# Patient Record
Sex: FEMALE | Race: WHITE
[De-identification: names, ages, dates, MRNs, and addresses within clinical notes are randomized per-mention and may not be internally consistent; named-entity substitution may affect disease eponyms.]

---

## 2021-06-22 ENCOUNTER — APPOINTMENT (OUTPATIENT)
Dept: DISASTER EMERGENCY | Facility: OTHER | Age: 86
End: 2021-06-22
Payer: COMMERCIAL

## 2021-06-22 PROCEDURE — 0001A: CPT

## 2021-07-13 ENCOUNTER — APPOINTMENT (OUTPATIENT)
Dept: DISASTER EMERGENCY | Facility: OTHER | Age: 86
End: 2021-07-13
Payer: COMMERCIAL

## 2021-07-13 PROCEDURE — 0002A: CPT

## 2022-12-03 ENCOUNTER — TRANSCRIPTION ENCOUNTER (OUTPATIENT)
Age: 87
End: 2022-12-03

## 2022-12-04 ENCOUNTER — INPATIENT (INPATIENT)
Facility: HOSPITAL | Age: 87
LOS: 11 days | Discharge: NURSING, REHAB OR LONG-TERM CARE | DRG: 480 | End: 2022-12-16
Attending: SPECIALIST | Admitting: SPECIALIST
Payer: MEDICARE

## 2022-12-04 VITALS
HEART RATE: 84 BPM | TEMPERATURE: 98 F | WEIGHT: 89.95 LBS | DIASTOLIC BLOOD PRESSURE: 88 MMHG | SYSTOLIC BLOOD PRESSURE: 173 MMHG | RESPIRATION RATE: 18 BRPM | HEIGHT: 60 IN | OXYGEN SATURATION: 96 %

## 2022-12-04 DIAGNOSIS — S72.142A DISPLACED INTERTROCHANTERIC FRACTURE OF LEFT FEMUR, INITIAL ENCOUNTER FOR CLOSED FRACTURE: ICD-10-CM

## 2022-12-04 DIAGNOSIS — S72.002A FRACTURE OF UNSPECIFIED PART OF NECK OF LEFT FEMUR, INITIAL ENCOUNTER FOR CLOSED FRACTURE: ICD-10-CM

## 2022-12-04 DIAGNOSIS — R52 PAIN, UNSPECIFIED: ICD-10-CM

## 2022-12-04 DIAGNOSIS — F03.90 UNSPECIFIED DEMENTIA, UNSPECIFIED SEVERITY, WITHOUT BEHAVIORAL DISTURBANCE, PSYCHOTIC DISTURBANCE, MOOD DISTURBANCE, AND ANXIETY: ICD-10-CM

## 2022-12-04 DIAGNOSIS — I10 ESSENTIAL (PRIMARY) HYPERTENSION: ICD-10-CM

## 2022-12-04 LAB
ALBUMIN SERPL ELPH-MCNC: 4.2 G/DL — SIGNIFICANT CHANGE UP (ref 3.3–5)
ALP SERPL-CCNC: 84 U/L — SIGNIFICANT CHANGE UP (ref 40–120)
ALT FLD-CCNC: 24 U/L — SIGNIFICANT CHANGE UP (ref 10–45)
ANION GAP SERPL CALC-SCNC: 12 MMOL/L — SIGNIFICANT CHANGE UP (ref 5–17)
ANION GAP SERPL CALC-SCNC: 13 MMOL/L — SIGNIFICANT CHANGE UP (ref 5–17)
APTT BLD: 27.6 SEC — SIGNIFICANT CHANGE UP (ref 27.5–35.5)
AST SERPL-CCNC: 22 U/L — SIGNIFICANT CHANGE UP (ref 10–40)
BASOPHILS # BLD AUTO: 0.07 K/UL — SIGNIFICANT CHANGE UP (ref 0–0.2)
BASOPHILS NFR BLD AUTO: 0.5 % — SIGNIFICANT CHANGE UP (ref 0–2)
BILIRUB SERPL-MCNC: 0.6 MG/DL — SIGNIFICANT CHANGE UP (ref 0.2–1.2)
BLD GP AB SCN SERPL QL: NEGATIVE — SIGNIFICANT CHANGE UP
BUN SERPL-MCNC: 10 MG/DL — SIGNIFICANT CHANGE UP (ref 7–23)
BUN SERPL-MCNC: 12 MG/DL — SIGNIFICANT CHANGE UP (ref 7–23)
CALCIUM SERPL-MCNC: 8.9 MG/DL — SIGNIFICANT CHANGE UP (ref 8.4–10.5)
CALCIUM SERPL-MCNC: 9.7 MG/DL — SIGNIFICANT CHANGE UP (ref 8.4–10.5)
CHLORIDE SERPL-SCNC: 96 MMOL/L — SIGNIFICANT CHANGE UP (ref 96–108)
CHLORIDE SERPL-SCNC: 96 MMOL/L — SIGNIFICANT CHANGE UP (ref 96–108)
CO2 SERPL-SCNC: 22 MMOL/L — SIGNIFICANT CHANGE UP (ref 22–31)
CO2 SERPL-SCNC: 25 MMOL/L — SIGNIFICANT CHANGE UP (ref 22–31)
CREAT SERPL-MCNC: 0.53 MG/DL — SIGNIFICANT CHANGE UP (ref 0.5–1.3)
CREAT SERPL-MCNC: 0.53 MG/DL — SIGNIFICANT CHANGE UP (ref 0.5–1.3)
EGFR: 86 ML/MIN/1.73M2 — SIGNIFICANT CHANGE UP
EGFR: 86 ML/MIN/1.73M2 — SIGNIFICANT CHANGE UP
EOSINOPHIL # BLD AUTO: 0.05 K/UL — SIGNIFICANT CHANGE UP (ref 0–0.5)
EOSINOPHIL NFR BLD AUTO: 0.4 % — SIGNIFICANT CHANGE UP (ref 0–6)
GLUCOSE SERPL-MCNC: 122 MG/DL — HIGH (ref 70–99)
GLUCOSE SERPL-MCNC: 125 MG/DL — HIGH (ref 70–99)
HCT VFR BLD CALC: 30.9 % — LOW (ref 34.5–45)
HCT VFR BLD CALC: 33.9 % — LOW (ref 34.5–45)
HGB BLD-MCNC: 10.4 G/DL — LOW (ref 11.5–15.5)
HGB BLD-MCNC: 11.4 G/DL — LOW (ref 11.5–15.5)
IMM GRANULOCYTES NFR BLD AUTO: 0.7 % — SIGNIFICANT CHANGE UP (ref 0–0.9)
INR BLD: 1.09 RATIO — SIGNIFICANT CHANGE UP (ref 0.88–1.16)
LYMPHOCYTES # BLD AUTO: 1.09 K/UL — SIGNIFICANT CHANGE UP (ref 1–3.3)
LYMPHOCYTES # BLD AUTO: 8.1 % — LOW (ref 13–44)
MCHC RBC-ENTMCNC: 30.6 PG — SIGNIFICANT CHANGE UP (ref 27–34)
MCHC RBC-ENTMCNC: 30.6 PG — SIGNIFICANT CHANGE UP (ref 27–34)
MCHC RBC-ENTMCNC: 33.6 GM/DL — SIGNIFICANT CHANGE UP (ref 32–36)
MCHC RBC-ENTMCNC: 33.7 GM/DL — SIGNIFICANT CHANGE UP (ref 32–36)
MCV RBC AUTO: 90.9 FL — SIGNIFICANT CHANGE UP (ref 80–100)
MCV RBC AUTO: 91.1 FL — SIGNIFICANT CHANGE UP (ref 80–100)
MONOCYTES # BLD AUTO: 0.65 K/UL — SIGNIFICANT CHANGE UP (ref 0–0.9)
MONOCYTES NFR BLD AUTO: 4.9 % — SIGNIFICANT CHANGE UP (ref 2–14)
NEUTROPHILS # BLD AUTO: 11.44 K/UL — HIGH (ref 1.8–7.4)
NEUTROPHILS NFR BLD AUTO: 85.4 % — HIGH (ref 43–77)
NRBC # BLD: 0 /100 WBCS — SIGNIFICANT CHANGE UP (ref 0–0)
NRBC # BLD: 0 /100 WBCS — SIGNIFICANT CHANGE UP (ref 0–0)
PLATELET # BLD AUTO: 254 K/UL — SIGNIFICANT CHANGE UP (ref 150–400)
PLATELET # BLD AUTO: 287 K/UL — SIGNIFICANT CHANGE UP (ref 150–400)
POTASSIUM SERPL-MCNC: 4 MMOL/L — SIGNIFICANT CHANGE UP (ref 3.5–5.3)
POTASSIUM SERPL-MCNC: 4.1 MMOL/L — SIGNIFICANT CHANGE UP (ref 3.5–5.3)
POTASSIUM SERPL-SCNC: 4 MMOL/L — SIGNIFICANT CHANGE UP (ref 3.5–5.3)
POTASSIUM SERPL-SCNC: 4.1 MMOL/L — SIGNIFICANT CHANGE UP (ref 3.5–5.3)
PROT SERPL-MCNC: 6.3 G/DL — SIGNIFICANT CHANGE UP (ref 6–8.3)
PROTHROM AB SERPL-ACNC: 12.6 SEC — SIGNIFICANT CHANGE UP (ref 10.5–13.4)
RBC # BLD: 3.4 M/UL — LOW (ref 3.8–5.2)
RBC # BLD: 3.72 M/UL — LOW (ref 3.8–5.2)
RBC # FLD: 12.9 % — SIGNIFICANT CHANGE UP (ref 10.3–14.5)
RBC # FLD: 12.9 % — SIGNIFICANT CHANGE UP (ref 10.3–14.5)
RH IG SCN BLD-IMP: POSITIVE — SIGNIFICANT CHANGE UP
SARS-COV-2 RNA SPEC QL NAA+PROBE: SIGNIFICANT CHANGE UP
SODIUM SERPL-SCNC: 131 MMOL/L — LOW (ref 135–145)
SODIUM SERPL-SCNC: 133 MMOL/L — LOW (ref 135–145)
WBC # BLD: 12.55 K/UL — HIGH (ref 3.8–10.5)
WBC # BLD: 13.4 K/UL — HIGH (ref 3.8–10.5)
WBC # FLD AUTO: 12.55 K/UL — HIGH (ref 3.8–10.5)
WBC # FLD AUTO: 13.4 K/UL — HIGH (ref 3.8–10.5)

## 2022-12-04 PROCEDURE — 99285 EMERGENCY DEPT VISIT HI MDM: CPT

## 2022-12-04 PROCEDURE — 71045 X-RAY EXAM CHEST 1 VIEW: CPT | Mod: 26

## 2022-12-04 PROCEDURE — 27245 TREAT THIGH FRACTURE: CPT | Mod: LT

## 2022-12-04 PROCEDURE — 93010 ELECTROCARDIOGRAM REPORT: CPT

## 2022-12-04 PROCEDURE — 73120 X-RAY EXAM OF HAND: CPT | Mod: 26,LT

## 2022-12-04 PROCEDURE — 73551 X-RAY EXAM OF FEMUR 1: CPT | Mod: 26,LT

## 2022-12-04 PROCEDURE — 73502 X-RAY EXAM HIP UNI 2-3 VIEWS: CPT | Mod: 26,LT

## 2022-12-04 DEVICE — BLADE HEL 11X85MM TI STRL: Type: IMPLANTABLE DEVICE | Site: LEFT | Status: FUNCTIONAL

## 2022-12-04 DEVICE — SCREW LOCKING 5.0MM: Type: IMPLANTABLE DEVICE | Site: LEFT | Status: FUNCTIONAL

## 2022-12-04 DEVICE — IMPLANTABLE DEVICE: Type: IMPLANTABLE DEVICE | Site: LEFT | Status: FUNCTIONAL

## 2022-12-04 RX ORDER — LISINOPRIL 2.5 MG/1
20 TABLET ORAL DAILY
Refills: 0 | Status: DISCONTINUED | OUTPATIENT
Start: 2022-12-04 | End: 2022-12-16

## 2022-12-04 RX ORDER — ACETAMINOPHEN 500 MG
600 TABLET ORAL ONCE
Refills: 0 | Status: COMPLETED | OUTPATIENT
Start: 2022-12-04 | End: 2022-12-04

## 2022-12-04 RX ORDER — ATORVASTATIN CALCIUM 80 MG/1
10 TABLET, FILM COATED ORAL AT BEDTIME
Refills: 0 | Status: DISCONTINUED | OUTPATIENT
Start: 2022-12-04 | End: 2022-12-16

## 2022-12-04 RX ORDER — OXYCODONE HYDROCHLORIDE 5 MG/1
5 TABLET ORAL EVERY 4 HOURS
Refills: 0 | Status: DISCONTINUED | OUTPATIENT
Start: 2022-12-04 | End: 2022-12-07

## 2022-12-04 RX ORDER — FENTANYL CITRATE 50 UG/ML
25 INJECTION INTRAVENOUS
Refills: 0 | Status: DISCONTINUED | OUTPATIENT
Start: 2022-12-04 | End: 2022-12-04

## 2022-12-04 RX ORDER — SODIUM CHLORIDE 9 MG/ML
1000 INJECTION, SOLUTION INTRAVENOUS
Refills: 0 | Status: DISCONTINUED | OUTPATIENT
Start: 2022-12-04 | End: 2022-12-04

## 2022-12-04 RX ORDER — OXYCODONE HYDROCHLORIDE 5 MG/1
5 TABLET ORAL EVERY 4 HOURS
Refills: 0 | Status: DISCONTINUED | OUTPATIENT
Start: 2022-12-04 | End: 2022-12-04

## 2022-12-04 RX ORDER — CEFAZOLIN SODIUM 1 G
2000 VIAL (EA) INJECTION EVERY 8 HOURS
Refills: 0 | Status: COMPLETED | OUTPATIENT
Start: 2022-12-04 | End: 2022-12-04

## 2022-12-04 RX ORDER — OXYCODONE HYDROCHLORIDE 5 MG/1
2.5 TABLET ORAL EVERY 4 HOURS
Refills: 0 | Status: DISCONTINUED | OUTPATIENT
Start: 2022-12-04 | End: 2022-12-07

## 2022-12-04 RX ORDER — LISINOPRIL 2.5 MG/1
20 TABLET ORAL EVERY 12 HOURS
Refills: 0 | Status: DISCONTINUED | OUTPATIENT
Start: 2022-12-04 | End: 2022-12-04

## 2022-12-04 RX ORDER — LANOLIN ALCOHOL/MO/W.PET/CERES
3 CREAM (GRAM) TOPICAL AT BEDTIME
Refills: 0 | Status: DISCONTINUED | OUTPATIENT
Start: 2022-12-04 | End: 2022-12-04

## 2022-12-04 RX ORDER — METOPROLOL TARTRATE 50 MG
50 TABLET ORAL DAILY
Refills: 0 | Status: DISCONTINUED | OUTPATIENT
Start: 2022-12-04 | End: 2022-12-08

## 2022-12-04 RX ORDER — OXYCODONE HYDROCHLORIDE 5 MG/1
2.5 TABLET ORAL EVERY 4 HOURS
Refills: 0 | Status: DISCONTINUED | OUTPATIENT
Start: 2022-12-04 | End: 2022-12-04

## 2022-12-04 RX ORDER — MAGNESIUM HYDROXIDE 400 MG/1
30 TABLET, CHEWABLE ORAL DAILY
Refills: 0 | Status: DISCONTINUED | OUTPATIENT
Start: 2022-12-04 | End: 2022-12-16

## 2022-12-04 RX ORDER — SENNA PLUS 8.6 MG/1
2 TABLET ORAL AT BEDTIME
Refills: 0 | Status: DISCONTINUED | OUTPATIENT
Start: 2022-12-04 | End: 2022-12-04

## 2022-12-04 RX ORDER — LANOLIN ALCOHOL/MO/W.PET/CERES
3 CREAM (GRAM) TOPICAL AT BEDTIME
Refills: 0 | Status: DISCONTINUED | OUTPATIENT
Start: 2022-12-04 | End: 2022-12-16

## 2022-12-04 RX ORDER — ATORVASTATIN CALCIUM 80 MG/1
10 TABLET, FILM COATED ORAL AT BEDTIME
Refills: 0 | Status: DISCONTINUED | OUTPATIENT
Start: 2022-12-04 | End: 2022-12-04

## 2022-12-04 RX ORDER — METOPROLOL TARTRATE 50 MG
50 TABLET ORAL DAILY
Refills: 0 | Status: DISCONTINUED | OUTPATIENT
Start: 2022-12-04 | End: 2022-12-04

## 2022-12-04 RX ORDER — ENOXAPARIN SODIUM 100 MG/ML
40 INJECTION SUBCUTANEOUS EVERY 24 HOURS
Refills: 0 | Status: DISCONTINUED | OUTPATIENT
Start: 2022-12-04 | End: 2022-12-08

## 2022-12-04 RX ORDER — ACETAMINOPHEN 500 MG
600 TABLET ORAL ONCE
Refills: 0 | Status: DISCONTINUED | OUTPATIENT
Start: 2022-12-04 | End: 2022-12-05

## 2022-12-04 RX ORDER — POLYETHYLENE GLYCOL 3350 17 G/17G
17 POWDER, FOR SOLUTION ORAL DAILY
Refills: 0 | Status: DISCONTINUED | OUTPATIENT
Start: 2022-12-04 | End: 2022-12-16

## 2022-12-04 RX ORDER — SODIUM CHLORIDE 9 MG/ML
1000 INJECTION INTRAMUSCULAR; INTRAVENOUS; SUBCUTANEOUS
Refills: 0 | Status: DISCONTINUED | OUTPATIENT
Start: 2022-12-04 | End: 2022-12-16

## 2022-12-04 RX ORDER — SENNA PLUS 8.6 MG/1
2 TABLET ORAL AT BEDTIME
Refills: 0 | Status: DISCONTINUED | OUTPATIENT
Start: 2022-12-04 | End: 2022-12-16

## 2022-12-04 RX ORDER — ACETAMINOPHEN 500 MG
975 TABLET ORAL EVERY 8 HOURS
Refills: 0 | Status: DISCONTINUED | OUTPATIENT
Start: 2022-12-04 | End: 2022-12-04

## 2022-12-04 RX ORDER — ONDANSETRON 8 MG/1
4 TABLET, FILM COATED ORAL ONCE
Refills: 0 | Status: DISCONTINUED | OUTPATIENT
Start: 2022-12-04 | End: 2022-12-04

## 2022-12-04 RX ORDER — FENTANYL CITRATE 50 UG/ML
50 INJECTION INTRAVENOUS
Refills: 0 | Status: DISCONTINUED | OUTPATIENT
Start: 2022-12-04 | End: 2022-12-04

## 2022-12-04 RX ADMIN — Medication 100 MILLIGRAM(S): at 22:05

## 2022-12-04 RX ADMIN — Medication 100 MILLIGRAM(S): at 14:38

## 2022-12-04 RX ADMIN — Medication 240 MILLIGRAM(S): at 02:20

## 2022-12-04 RX ADMIN — LISINOPRIL 20 MILLIGRAM(S): 2.5 TABLET ORAL at 06:17

## 2022-12-04 RX ADMIN — Medication 600 MILLIGRAM(S): at 03:33

## 2022-12-04 RX ADMIN — Medication 600 MILLIGRAM(S): at 01:36

## 2022-12-04 RX ADMIN — Medication 975 MILLIGRAM(S): at 06:16

## 2022-12-04 RX ADMIN — POLYETHYLENE GLYCOL 3350 17 GRAM(S): 17 POWDER, FOR SOLUTION ORAL at 22:05

## 2022-12-04 RX ADMIN — Medication 50 MILLIGRAM(S): at 06:17

## 2022-12-04 NOTE — CHART NOTE - NSCHARTNOTEFT_GEN_A_CORE
Resting without complaints.  No Chest Pain, SOB, N/V.    T(C): 36 (12-04-22 @ 12:03), Max: 37.2 (12-04-22 @ 06:15)  HR: 67 (12-04-22 @ 12:45) (61 - 102)  BP: 143/67 (12-04-22 @ 12:45) (113/69 - 173/88)  RR: 17 (12-04-22 @ 12:03) (17 - 18)  SpO2: 97% (12-04-22 @ 12:45) (95% - 100%)      Exam:  Alert and Orientx1 to name Baseline with Hx of dementia, No Acute Distress  Card: +S1/S2, RRR  Pulm: CTAB  Laterality: L Hip  Aquacel dressing x 2 c//d/i (Proximal dressing replaced 2/2 patient self removed dressing during agitated state post operatively)  Calves soft, non-tender bilaterally  +PF/DF/EHL/FHL  SILT  + DP pulse    Xray: Intra-Op Fluoroscopy: S/P L Intertrochanteric Femur Fx IM Nail ORIF                          10.4   12.55 )-----------( 254      ( 04 Dec 2022 12:24 )             30.9    12-04    131<L>  |  96  |  10  ----------------------------<  122<H>  4.1   |  22  |  0.53    Ca    8.9      04 Dec 2022 12:24    TPro  6.3  /  Alb  4.2  /  TBili  0.6  /  DBili  x   /  AST  22  /  ALT  24  /  AlkPhos  84  12-04      A/P: 89yFemale S/p L Intertrochanteric Femur Fx IM Nail ORIF. VSS. NAD  -PT/OT-WBAT LLE  -F/U AM Labs tomorow  -Ween off level 1 wrist restraints when patient more oriented and less agitated to prevent self harm.  -IS  -DVT PPx: Lovenox 40mg SQ Daily, SCDs, Early OOB and Ambulation  -Pain Control  -Continue Current Tx  -Dispo planning PACU to Floor    Tarik Falk PA-C  Orthopedic Surgery Team  Team Pager #[phone]

## 2022-12-04 NOTE — H&P ADULT - HISTORY OF PRESENT ILLNESS
Orthopedics Consult Note:    This is a 89y Female who presents to the ED today with c/o left hip pain and inability to bear weight s/p mechanical trip and fall today. Pt denies any other injuries. Pt denies head trauma or LOC. Pt denies any numbness, tingling or paresthesias. Pt is a home ambulator without use of any assistive devices at baseline.    Past Medical & Surgical History:  Displaced intertrochanteric fracture of left femur, initial encounter for closed fracture    Handoff    MEWS Score    Hypertension    Dementia    Intertrochanteric fracture of left hip    HIP INJURY    [email or web address]        Allergies:  No Known Allergies      Vital Signs:  T(C): 36.7 (12-04-22 @ 03:40), Max: 36.7 (12-04-22 @ 00:43)  HR: 61 (12-04-22 @ 03:40) (61 - 84)  BP: 137/73 (12-04-22 @ 03:40) (137/73 - 173/88)  RR: 17 (12-04-22 @ 03:40) (17 - 18)  SpO2: 95% (12-04-22 @ 03:40) (95% - 96%)    Labs:  CBC ( 12-04-22 @ 02:11 )                   11.4  13.40 )-----------( 287                33.9      Chem ( 12-04-22 @ 02:11 )  133  |  96  |  12  ----------------------------<  125  4.0   |  25  |  0.53      PT/INR ( 12-04-22 @ 02:11 )   PT: 12.6;   INR: 1.09      PTT ( 12-04-22 @ 02:11 )  PTT: 27.6      Imaging:  X-rays of the pelvis, left hip and femur demonstrate an intertrochanteric hip fracture.    PE left hip:  LLE is short and ER. +mild swelling, no ecchymosis, no erythema, skin intact, normothermic. +TTP over groin and greater troch. LROM 2' pain. Moving ankle and all toes, +EHL/FHL/TA/GS. SILT throughout. DP and PT pulses 2+.    Secondary Survey:  Left knee, left ankle, RLE, and B/L UEs with no swelling, no ecchymoses, no abrasions, no lacerations or any other signs of injury with full painless baseline ROM and no bony TTP. Able to SLR RLE. No pain with axial loading or log roll RLE. Sensation intact distally, moving all digits. Distal pulses intact.     Spine and ribs with no bony TTP.     Assessment:  89y Female with a left intertrochanteric hip fracture s/p mechanical trip and fall today.    Plan:  Pt and family advised that surgical fixation of right hip fracture with Intramedullary Nail is necessary.  Surgical procedure was discussed in detail with patient and family including all R/B/As; Pt/Pts family expressed understanding and consent for surgery was obtained.  consult Medical service for optimization and medical clearance.  f/u medical clearance.  f/u labs/imaging.  Complete bedrest.  Pain control.  Ice application.  DVT prophylaxis with SCDs for now.  NPO and hold chemical anticoagulation now with plan for possible OR today pending medical optimization and clearance.   IVF while NPO.  Case discussed with Dr. Blair who agrees with plan.

## 2022-12-04 NOTE — CONSULT NOTE ADULT - PROBLEM SELECTOR RECOMMENDATION 3
continue metoprolol and lisinopril  will continue to monitor BP  Will adjust meds as needed  Low sodium diet

## 2022-12-04 NOTE — ED ADULT NURSE NOTE - OBJECTIVE STATEMENT
Pt received awake, confused, A+O x2, disoriented to time and situation. Pt's daughter at bedside states pt's PMHx of Dementia, she is at her baseline. Pt had unwitnessed mechanical fall at home onto carpeted floor. Pt denies pain at rest, she sustained left hip Fx.

## 2022-12-04 NOTE — CHART NOTE - NSCHARTNOTEFT_GEN_A_CORE
RN called provider bedside stating patient agitated and trying to get OOB.  Patient seen bedside who is confused at baseline A&Ox1 (name), with Hx of dementia.  Patient reports wants to get OOB and use bathroom because she needs to urinate.  Patient refusing to use pre-vena and attempting to pull IV and pre-vena off.  Patient plan for OR today after medical clearance.  Patient ordered for wrist restraint to prevent self hard and ordered a Leyva catheter for intra-operative procedure.      Tarik Falk PA-C  Orthopedic Surgery Team  Team Pager #[phone]

## 2022-12-04 NOTE — BRIEF OPERATIVE NOTE - NSICDXBRIEFPROCEDURE_GEN_ALL_CORE_FT
PROCEDURES:  Insertion of locking intramedullary star into left hip 04-Dec-2022 12:22:16  Aguilar Cordon

## 2022-12-04 NOTE — ED PROVIDER NOTE - PROGRESS NOTE DETAILS
Paolo, PGY3: patient updated on hip fracture. pain well controlled. spoke with ortho, will admit for surgical planning.

## 2022-12-04 NOTE — CONSULT NOTE ADULT - ASSESSMENT
89 YO woman with a hx of HTN, HLD, Dementia, macular degeneration, Presents after a fall at home with a left hip fracture. Patient is scheduled fvor an Open reduction and internal fixation of the left hip

## 2022-12-04 NOTE — PATIENT PROFILE ADULT - FALL HARM RISK - HARM RISK INTERVENTIONS

## 2022-12-04 NOTE — CONSULT NOTE ADULT - SUBJECTIVE AND OBJECTIVE BOX
89 yo female presenting after fall. pt states door was partially opened and tripped and fell onto her left side. afterward complained of pain to left leg and hip. Unable to ambulate since. No h/o prior injuries to leg. Denies any head trauma. no n/v. no chest pain or sob. is not on any blood thinners. Did not take anything for pain prior to arrival. Patient was brought to [facility] for further evaluation and treatment. In the ED she was found to have a left hip fracture and is scheduled for an Open reduction and internal fixation of the left hip. Patient seen now resting, confused        PAST MEDICAL & SURGICAL HISTORY:  Hypertension    HLD    Macular degeneration    Dementia            MEDICATIONS  (STANDING):  acetaminophen     Tablet .. 975 milliGRAM(s) Oral every 8 hours  atorvastatin 10 milliGRAM(s) Oral at bedtime  lactated ringers. 1000 milliLiter(s) (75 mL/Hr) IV Continuous <Continuous>  lisinopril 20 milliGRAM(s) Oral every 12 hours  metoprolol succinate ER 50 milliGRAM(s) Oral daily  senna 2 Tablet(s) Oral at bedtime    MEDICATIONS  (PRN):  melatonin 3 milliGRAM(s) Oral at bedtime PRN Insomnia  oxyCODONE    IR 2.5 milliGRAM(s) Oral every 4 hours PRN Moderate Pain (4 - 6)  oxyCODONE    IR 5 milliGRAM(s) Oral every 4 hours PRN Severe Pain (7 - 10)    Social Hx:  Tobacco: Neg  ETOH: neg  Drugs:  Neg    Family Hx:  As per my conversation with the patient, non contributory       ROS  CONSTITUTIONAL: No weakness, fevers or chills  EYES/ENT: Loss of vision  NECK: No pain or stiffness  RESPIRATORY: No cough, wheezing, hemoptysis; No shortness of breath  CARDIOVASCULAR: No chest pain or palpitations  GASTROINTESTINAL: No abdominal or epigastric pain. No nausea, vomiting, or hematemesis; No diarrhea or constipation. No melena or hematochezia.  GENITOURINARY: No dysuria, frequency or hematuria  NEUROLOGICAL: No numbness or weakness  SKIN: No itching, burning, rashes, or lesions   MUSCULOSKELETAL: Left leg pain        INTERVAL HPI/OVERNIGHT EVENTS:  T(C): 37.2 (12-04-22 @ 06:15), Max: 37.2 (12-04-22 @ 06:15)  HR: 75 (12-04-22 @ 06:15) (61 - 84)  BP: 172/69 (12-04-22 @ 06:15) (137/73 - 173/88)  RR: 18 (12-04-22 @ 06:15) (17 - 18)  SpO2: 99% (12-04-22 @ 06:15) (95% - 99%)  Wt(kg): --  I&O's Summary      PHYSICAL EXAM:  GENERAL: NAD, well-groomed, well-developed  HEAD:  Atraumatic, Normocephalic  EYES: EOMI, PERRLA, conjunctiva and sclera clear  ENMT: No tonsillar erythema, exudates, or enlargement; Moist mucous membranes, Good dentition, No lesions  NECK: Supple, No JVD, Normal thyroid  NERVOUS SYSTEM:  Alert & Oriented X1,  Motor Strength 5/5 B/L upper and lower extremities; DTRs 2+ intact and symmetric  CHEST/LUNG: Clear to percussion bilaterally; No rales, rhonchi, wheezing, or rubs  HEART: Regular rate and rhythm; No murmurs, rubs, or gallops  ABDOMEN: Soft, Nontender, Nondistended; Bowel sounds present  EXTREMITIES:  2+ Peripheral Pulses, No clubbing, cyanosis, or edema  LYMPH: No lymphadenopathy noted  SKIN: No rashes or lesions        LABS:                        11.4   13.40 )-----------( 287      ( 04 Dec 2022 02:11 )             33.9     12-04    133<L>  |  96  |  12  ----------------------------<  125<H>  4.0   |  25  |  0.53    Ca    9.7      04 Dec 2022 02:11    TPro  6.3  /  Alb  4.2  /  TBili  0.6  /  DBili  x   /  AST  22  /  ALT  24  /  AlkPhos  84  12-04    PT/INR - ( 04 Dec 2022 02:11 )   PT: 12.6 sec;   INR: 1.09 ratio         PTT - ( 04 Dec 2022 02:11 )  PTT:27.6 sec    EKG:     89 yo female presenting after fall. pt states door was partially opened and tripped and fell onto her left side. afterward complained of pain to left leg and hip. Unable to ambulate since. No h/o prior injuries to leg. Denies any head trauma. no n/v. no chest pain or sob. is not on any blood thinners. Did not take anything for pain prior to arrival. Patient was brought to [facility] for further evaluation and treatment. In the ED she was found to have a left hip fracture and is scheduled for an Open reduction and internal fixation of the left hip. Patient seen now resting, confused        PAST MEDICAL & SURGICAL HISTORY:  Hypertension    HLD    Macular degeneration    Dementia            MEDICATIONS  (STANDING):  acetaminophen     Tablet .. 975 milliGRAM(s) Oral every 8 hours  atorvastatin 10 milliGRAM(s) Oral at bedtime  lactated ringers. 1000 milliLiter(s) (75 mL/Hr) IV Continuous <Continuous>  lisinopril 20 milliGRAM(s) Oral every 12 hours  metoprolol succinate ER 50 milliGRAM(s) Oral daily  senna 2 Tablet(s) Oral at bedtime    MEDICATIONS  (PRN):  melatonin 3 milliGRAM(s) Oral at bedtime PRN Insomnia  oxyCODONE    IR 2.5 milliGRAM(s) Oral every 4 hours PRN Moderate Pain (4 - 6)  oxyCODONE    IR 5 milliGRAM(s) Oral every 4 hours PRN Severe Pain (7 - 10)    Social Hx:  Tobacco: Neg  ETOH: neg  Drugs:  Neg    Family Hx:  As per my conversation with the patient, non contributory       ROS  CONSTITUTIONAL: No weakness, fevers or chills  EYES/ENT: Loss of vision  NECK: No pain or stiffness  RESPIRATORY: No cough, wheezing, hemoptysis; No shortness of breath  CARDIOVASCULAR: No chest pain or palpitations  GASTROINTESTINAL: No abdominal or epigastric pain. No nausea, vomiting, or hematemesis; No diarrhea or constipation. No melena or hematochezia.  GENITOURINARY: No dysuria, frequency or hematuria  NEUROLOGICAL: No numbness or weakness  SKIN: No itching, burning, rashes, or lesions   MUSCULOSKELETAL: Left leg pain        INTERVAL HPI/OVERNIGHT EVENTS:  T(C): 37.2 (12-04-22 @ 06:15), Max: 37.2 (12-04-22 @ 06:15)  HR: 75 (12-04-22 @ 06:15) (61 - 84)  BP: 172/69 (12-04-22 @ 06:15) (137/73 - 173/88)  RR: 18 (12-04-22 @ 06:15) (17 - 18)  SpO2: 99% (12-04-22 @ 06:15) (95% - 99%)  Wt(kg): --  I&O's Summary      PHYSICAL EXAM:  GENERAL: NAD, well-groomed, well-developed  HEAD:  Atraumatic, Normocephalic  EYES: EOMI, PERRLA, conjunctiva and sclera clear  ENMT: No tonsillar erythema, exudates, or enlargement; Moist mucous membranes, Good dentition, No lesions  NECK: Supple, No JVD, Normal thyroid  NERVOUS SYSTEM:  Alert & Oriented X1,  Motor Strength 5/5 B/L upper and lower extremities; DTRs 2+ intact and symmetric  CHEST/LUNG: Clear to percussion bilaterally; No rales, rhonchi, wheezing, or rubs  HEART: Regular rate and rhythm; No murmurs, rubs, or gallops  ABDOMEN: Soft, Nontender, Nondistended; Bowel sounds present  EXTREMITIES:  2+ Peripheral Pulses, No clubbing, cyanosis, or edema  LYMPH: No lymphadenopathy noted  SKIN: No rashes or lesions        LABS:                        11.4   13.40 )-----------( 287      ( 04 Dec 2022 02:11 )             33.9     12-04    133<L>  |  96  |  12  ----------------------------<  125<H>  4.0   |  25  |  0.53    Ca    9.7      04 Dec 2022 02:11    TPro  6.3  /  Alb  4.2  /  TBili  0.6  /  DBili  x   /  AST  22  /  ALT  24  /  AlkPhos  84  12-04    PT/INR - ( 04 Dec 2022 02:11 )   PT: 12.6 sec;   INR: 1.09 ratio         PTT - ( 04 Dec 2022 02:11 )  PTT:27.6 sec    EKG:Sinus rhythm with PACs @ 72 Incomplete RBBB

## 2022-12-04 NOTE — ED PROVIDER NOTE - OBJECTIVE STATEMENT
Attending Nelia Booker: 89 yo female presenting after fall. pt states door was partially opened and tripped and fell onto her left side. afterward complained of pain to left leg and hip. unable to ambulate since. no h/o prior injuries to leg. denies any head trauma. no n/v. no chest pain or sob. is not on any blood thinners. did not take anything for pain prior to arrival Attending Nelia Booker: 89 yo female presenting after fall. pt states door was partially opened and tripped and fell onto her left side. afterward complained of pain to left leg and hip. unable to ambulate since. no h/o prior injuries to leg. denies any head trauma. no n/v. no chest pain or sob. is not on any blood thinners. did not take anything for pain prior to arrival    Paolo, PGY3: 89-year-old female with history of dementia and hypertension presenting with fall and left hip pain.  Patient was ambulating in the middle of the night, thought that the sliding door was open but was closed causing her to trip and fall onto her left hip.  Denies head strike, LOC.

## 2022-12-04 NOTE — CONSULT NOTE ADULT - TIME BILLING
Discussed treatment plan with patient at bedside and daughter by phone. Discussed treatment plan with patient at bedside and daughter by phone.  I am a non participating [employer] physician seeing Pt in coverage for Dr Villagomez Discussed treatment plan with patient at bedside and daughter by phone.  I am a non participating [employer] physician seeing Pt in coverage for Dr Villagomez. The above patient documentation by Dr. Pritchett was reviewed and I agree with his evaluation, assessment and treatment plan.  Matthew Villagomez M.D.

## 2022-12-04 NOTE — ED PROVIDER NOTE - CLINICAL SUMMARY MEDICAL DECISION MAKING FREE TEXT BOX
89-year-old female with history of hypertension and dementia presenting with L hip pain following fall. well appearing, clear lungs, no bruising, chest/abdomen nontender, tender along lateral aspect of L hip with pain with rom. Suspect acute hip fracture. Will get labs, treat pain, XR, and dispo accordingly. 89-year-old female with history of hypertension and dementia presenting with L hip pain following fall. well appearing, clear lungs, no bruising, chest/abdomen nontender, tender along lateral aspect of L hip with pain with rom. Suspect acute hip fracture. Will get labs, treat pain, XR, and dispo accordingly.  Attending Nelia Booker: 89 yo female presenting with left hip pain after fall. primary survey intact. gcs 15. on secondary survey pt with ttp left hip. concern for fracture based on exam. pt denies any head trauma. will obtain labs, xrays. pain control as needed.

## 2022-12-04 NOTE — ED PROVIDER NOTE - ATTENDING CONTRIBUTION TO CARE
Attending MD Nelia Booker:  I personally have seen and examined this patient.  Resident note reviewed and agree on plan of care and except where noted.  See HPI, PE, and MDM for details.

## 2022-12-04 NOTE — ED ADULT NURSE NOTE - NSIMPLEMENTINTERV_GEN_ALL_ED
Implemented All Fall with Harm Risk Interventions:  [place] to call system. Call bell, personal items and telephone within reach. Instruct patient to call for assistance. Room bathroom lighting operational. Non-slip footwear when patient is off stretcher. Physically safe environment: no spills, clutter or unnecessary equipment. Stretcher in lowest position, wheels locked, appropriate side rails in place. Provide visual cue, wrist band, yellow gown, etc. Monitor gait and stability. Monitor for mental status changes and reorient to person, place, and time. Review medications for side effects contributing to fall risk. Reinforce activity limits and safety measures with patient and family. Provide visual clues: red socks.

## 2022-12-04 NOTE — ED PROVIDER NOTE - PHYSICAL EXAMINATION
Attending Nelia Booker: Gen: NAD, heent: atrauamtic, eomi, perrla, mmm, op pink, uvula midline, neck; nttp, no midline ttoy, chest: nttp, no crepitus, cv: rrr, no murmurs, lungs: ctab, abd: soft, nontender, nondistended, no peritoneal signs, , no guarding, ext: wwp, left leg shortened and rotated. strong distal pulses, ttp left hip, skin: no rash, neuro: awake and alert, following commands, speech clear, sensation and strength intact, no focal deficits

## 2022-12-05 ENCOUNTER — TRANSCRIPTION ENCOUNTER (OUTPATIENT)
Age: 87
End: 2022-12-05

## 2022-12-05 PROBLEM — Z00.00 ENCOUNTER FOR PREVENTIVE HEALTH EXAMINATION: Status: ACTIVE | Noted: 2022-12-05

## 2022-12-05 LAB
ANION GAP SERPL CALC-SCNC: 14 MMOL/L — SIGNIFICANT CHANGE UP (ref 5–17)
BUN SERPL-MCNC: 12 MG/DL — SIGNIFICANT CHANGE UP (ref 7–23)
CALCIUM SERPL-MCNC: 9 MG/DL — SIGNIFICANT CHANGE UP (ref 8.4–10.5)
CHLORIDE SERPL-SCNC: 103 MMOL/L — SIGNIFICANT CHANGE UP (ref 96–108)
CO2 SERPL-SCNC: 23 MMOL/L — SIGNIFICANT CHANGE UP (ref 22–31)
CREAT SERPL-MCNC: 0.61 MG/DL — SIGNIFICANT CHANGE UP (ref 0.5–1.3)
EGFR: 83 ML/MIN/1.73M2 — SIGNIFICANT CHANGE UP
GLUCOSE SERPL-MCNC: 110 MG/DL — HIGH (ref 70–99)
HCT VFR BLD CALC: 27 % — LOW (ref 34.5–45)
HGB BLD-MCNC: 8.9 G/DL — LOW (ref 11.5–15.5)
MCHC RBC-ENTMCNC: 30.6 PG — SIGNIFICANT CHANGE UP (ref 27–34)
MCHC RBC-ENTMCNC: 33 GM/DL — SIGNIFICANT CHANGE UP (ref 32–36)
MCV RBC AUTO: 92.8 FL — SIGNIFICANT CHANGE UP (ref 80–100)
NRBC # BLD: 0 /100 WBCS — SIGNIFICANT CHANGE UP (ref 0–0)
PLATELET # BLD AUTO: 239 K/UL — SIGNIFICANT CHANGE UP (ref 150–400)
POTASSIUM SERPL-MCNC: 3.8 MMOL/L — SIGNIFICANT CHANGE UP (ref 3.5–5.3)
POTASSIUM SERPL-SCNC: 3.8 MMOL/L — SIGNIFICANT CHANGE UP (ref 3.5–5.3)
RBC # BLD: 2.91 M/UL — LOW (ref 3.8–5.2)
RBC # FLD: 13.1 % — SIGNIFICANT CHANGE UP (ref 10.3–14.5)
SODIUM SERPL-SCNC: 140 MMOL/L — SIGNIFICANT CHANGE UP (ref 135–145)
WBC # BLD: 13.2 K/UL — HIGH (ref 3.8–10.5)
WBC # FLD AUTO: 13.2 K/UL — HIGH (ref 3.8–10.5)

## 2022-12-05 RX ORDER — ENOXAPARIN SODIUM 100 MG/ML
40 INJECTION SUBCUTANEOUS
Qty: 0 | Refills: 0 | DISCHARGE
Start: 2022-12-05

## 2022-12-05 RX ORDER — POLYETHYLENE GLYCOL 3350 17 G/17G
17 POWDER, FOR SOLUTION ORAL
Qty: 0 | Refills: 0 | DISCHARGE
Start: 2022-12-05

## 2022-12-05 RX ORDER — SENNA PLUS 8.6 MG/1
2 TABLET ORAL
Qty: 0 | Refills: 0 | DISCHARGE
Start: 2022-12-05

## 2022-12-05 RX ORDER — OXYCODONE HYDROCHLORIDE 5 MG/1
1 TABLET ORAL
Qty: 0 | Refills: 0 | DISCHARGE
Start: 2022-12-05

## 2022-12-05 RX ORDER — ACETAMINOPHEN 500 MG
975 TABLET ORAL EVERY 8 HOURS
Refills: 0 | Status: DISCONTINUED | OUTPATIENT
Start: 2022-12-05 | End: 2022-12-16

## 2022-12-05 RX ORDER — ACETAMINOPHEN 500 MG
3 TABLET ORAL
Qty: 0 | Refills: 0 | DISCHARGE
Start: 2022-12-05

## 2022-12-05 RX ORDER — INFLUENZA VIRUS VACCINE 15; 15; 15; 15 UG/.5ML; UG/.5ML; UG/.5ML; UG/.5ML
0.7 SUSPENSION INTRAMUSCULAR ONCE
Refills: 0 | Status: DISCONTINUED | OUTPATIENT
Start: 2022-12-05 | End: 2022-12-16

## 2022-12-05 RX ORDER — OXYCODONE HYDROCHLORIDE 5 MG/1
2.5 TABLET ORAL
Qty: 0 | Refills: 0 | DISCHARGE
Start: 2022-12-05

## 2022-12-05 RX ORDER — LANOLIN ALCOHOL/MO/W.PET/CERES
1 CREAM (GRAM) TOPICAL
Qty: 0 | Refills: 0 | DISCHARGE
Start: 2022-12-05

## 2022-12-05 RX ORDER — MAGNESIUM HYDROXIDE 400 MG/1
30 TABLET, CHEWABLE ORAL
Qty: 0 | Refills: 0 | DISCHARGE
Start: 2022-12-05

## 2022-12-05 RX ORDER — ACETAMINOPHEN 500 MG
600 TABLET ORAL ONCE
Refills: 0 | Status: COMPLETED | OUTPATIENT
Start: 2022-12-05 | End: 2022-12-05

## 2022-12-05 RX ADMIN — Medication 50 MILLIGRAM(S): at 06:12

## 2022-12-05 RX ADMIN — LISINOPRIL 20 MILLIGRAM(S): 2.5 TABLET ORAL at 06:12

## 2022-12-05 RX ADMIN — ENOXAPARIN SODIUM 40 MILLIGRAM(S): 100 INJECTION SUBCUTANEOUS at 05:16

## 2022-12-05 RX ADMIN — POLYETHYLENE GLYCOL 3350 17 GRAM(S): 17 POWDER, FOR SOLUTION ORAL at 12:35

## 2022-12-05 RX ADMIN — ATORVASTATIN CALCIUM 10 MILLIGRAM(S): 80 TABLET, FILM COATED ORAL at 21:09

## 2022-12-05 RX ADMIN — Medication 975 MILLIGRAM(S): at 21:09

## 2022-12-05 RX ADMIN — Medication 240 MILLIGRAM(S): at 14:10

## 2022-12-05 RX ADMIN — SENNA PLUS 2 TABLET(S): 8.6 TABLET ORAL at 21:09

## 2022-12-05 RX ADMIN — Medication 600 MILLIGRAM(S): at 14:25

## 2022-12-05 RX ADMIN — Medication 3 MILLIGRAM(S): at 02:07

## 2022-12-05 RX ADMIN — Medication 975 MILLIGRAM(S): at 21:39

## 2022-12-05 NOTE — DISCHARGE NOTE PROVIDER - DETAILS OF MALNUTRITION DIAGNOSIS/DIAGNOSES
This patient has been assessed with a concern for Malnutrition and was treated during this hospitalization for the following Nutrition diagnosis/diagnoses:     -  12/07/2022: Severe protein-calorie malnutrition   -  12/07/2022: Underweight (BMI < 19)

## 2022-12-05 NOTE — DISCHARGE NOTE PROVIDER - NSDCCPTREATMENT_GEN_ALL_CORE_FT
PRINCIPAL PROCEDURE  Procedure: Insertion of locking intramedullary star into left hip  Findings and Treatment:

## 2022-12-05 NOTE — OCCUPATIONAL THERAPY INITIAL EVALUATION ADULT - LIVES WITH, PROFILE
Per chart, pt lives with daughter in PH +4 [address] and first floor setup. Pt ambulates with cane and required assistance for ADLs.

## 2022-12-05 NOTE — PHYSICAL THERAPY INITIAL EVALUATION ADULT - ADDITIONAL COMMENTS
As per CM note, pt lives with dtr with 4 steps to enter and first floor set up. Pt amb with SC and require assist for ADLs.

## 2022-12-05 NOTE — PROGRESS NOTE ADULT - TIME BILLING
Discussed treatment plan with staff and patient at bedside. Discussed treatment plan with staff and patient at bedside.  I am a non participating [employer] physician seeing Pt in coverage for Dr Villagomez Discussed treatment plan with staff and patient at bedside.  I am a non participating [employer] physician seeing Pt in coverage for Dr Villagomez. The above patient documentation by Dr. Pritchett was reviewed and I agree with his evaluation, assessment and treatment plan.  Matthew Villagomez M.D.

## 2022-12-05 NOTE — PROGRESS NOTE ADULT - ASSESSMENT
89y Female POD #1 s/p L IMN for femur fracture  - Pain control  - WBAT  - Wean off level 1 wrist restraints when patient more oriented and less agitated to prevent self harm  - PT/OT/OOB  - DVT ppx: Lovenox 40 mg daily, SCDs  - Dispo planning

## 2022-12-05 NOTE — DISCHARGE NOTE PROVIDER - NSDCMRMEDTOKEN_GEN_ALL_CORE_FT
atorvastatin 10 mg oral tablet: 1 tab(s) orally once a day (at bedtime)  Metoprolol Succinate ER 50 mg oral tablet, extended release: 1 tab(s) orally once a day  ramipril 5 mg oral tablet: 1 tab(s) orally 2 times a day   acetaminophen 325 mg oral tablet: 3 tab(s) orally every 8 hours  atorvastatin 10 mg oral tablet: 1 tab(s) orally once a day (at bedtime)  enoxaparin: 40 milligram(s) subcutaneous once a day x 6 weeks post operatively for DVT ppx  magnesium hydroxide 8% oral suspension: 30 milliliter(s) orally once a day, As needed, Constipation  melatonin 3 mg oral tablet: 1 tab(s) orally once a day (at bedtime), As needed, Insomnia  Metoprolol Succinate ER 50 mg oral tablet, extended release: 1 tab(s) orally once a day  oxyCODONE: 2.5 milligram(s) orally every 4 hours as needed for moderate pain  oxyCODONE 5 mg oral tablet: 1 tab(s) orally every 4 hours, As needed, Severe Pain (7 - 10)  polyethylene glycol 3350 oral powder for reconstitution: 17 gram(s) orally once a day  ramipril 5 mg oral tablet: 1 tab(s) orally 2 times a day  senna leaf extract oral tablet: 2 tab(s) orally once a day (at bedtime)

## 2022-12-05 NOTE — PHYSICAL THERAPY INITIAL EVALUATION ADULT - PERTINENT HX OF CURRENT PROBLEM, REHAB EVAL
Pt is a 89 y/o female admitted to [facility] on 12/4/22  presents to the ED today with c/o left hip pain and inability to bear weight s/p mechanical trip and fall today. Pt denies any other injuries. Pt denies head trauma or LOC. Pt denies any numbness, tingling or paresthesias. Pt is a home ambulator without use of any assistive devices at baseline. XR pelvis : Comminuted intertrochanteric fracture of the left hip is present with varus angulation and approximately one half bone width posterior displacement of the diaphysis. Pt is now s/p L IMN on 12/4/22

## 2022-12-05 NOTE — DISCHARGE NOTE PROVIDER - CARE PROVIDER_API CALL
Javon Blair)  Orthopaedic Surgery  [address]  [place] [address]  Phone: [phone]  Fax: [phone]  Follow Up Time:

## 2022-12-05 NOTE — PHYSICAL THERAPY INITIAL EVALUATION ADULT - GAIT DEVIATIONS NOTED, PT EVAL
decreased tiny/decreased velocity of limb motion/decreased step length/decreased weight-shifting ability

## 2022-12-05 NOTE — OCCUPATIONAL THERAPY INITIAL EVALUATION ADULT - DIAGNOSIS, OT EVAL
Pt p/w impaired balance, strength, endurance, AROM, cognition impacting independence with ADLs and functional mobility.

## 2022-12-05 NOTE — DISCHARGE NOTE PROVIDER - HOSPITAL COURSE
History of Present Illness:   Orthopedics Consult Note:    This is a 89y Female who presents to the ED today with c/o left hip pain and inability to bear weight s/p mechanical trip and fall today. Pt denies any other injuries. Pt denies head trauma or LOC. Pt denies any numbness, tingling or paresthesias. Pt is a home ambulator without use of any assistive devices at baseline.    Past Medical & Surgical History:    Macular degeneration  HLD  Hypertension  Dementia    HOSPITAL COURSE:  89 y/o FM underwent surgical fixation of left hip with intermedullary star on 12/4/2022 with Dr. Blair.  Patient tolerated procedure well.  Patient was evaluated postoperatively by physical and occupational therapists for weight bearing as tolerated and advised that patient would benefit from admission to rehab facility.  Patient advised to keep surgical incisions/dressings clean and dry, and have dressing removed post op day #15(12/19/2022).  Patient further advised to follow up with Dr. Blair in his office 3-4 weeks post op.         History of Present Illness:   Orthopedics Consult Note:    This is a 89y Female who presents to the ED today with c/o left hip pain and inability to bear weight s/p mechanical trip and fall today. Pt denies any other injuries. Pt denies head trauma or LOC. Pt denies any numbness, tingling or paresthesias. Pt is a home ambulator without use of any assistive devices at baseline.    Past Medical & Surgical History:    Macular degeneration  HLD  Hypertension  Dementia    HOSPITAL COURSE:  89 y/o FM underwent surgical fixation of left hip with intermedullary star on 12/4/2022 with Dr. Blair.  Patient tolerated procedure well.  Patient was transfused PRBCs perioperatively for acute blood loss anemia. Patient was evaluated postoperatively by physical and occupational therapists for weight bearing as tolerated and advised that patient would benefit from admission to rehab facility.  Patient advised to keep surgical incisions/dressings clean and dry, and have dressing removed post op day #15(12/19/2022).  Patient further advised to follow up with Dr. Blair in his office 3-4 weeks post op.         History of Present Illness:   Orthopedics Consult Note:    This is a 89y Female who presents to the ED today with c/o left hip pain and inability to bear weight s/p mechanical trip and fall today. Pt denies any other injuries. Pt denies head trauma or LOC. Pt denies any numbness, tingling or paresthesias. Pt is a home ambulator without use of any assistive devices at baseline.    Past Medical & Surgical History:    Macular degeneration  HLD  Hypertension  Dementia    HOSPITAL COURSE:  89 y/o FM underwent surgical fixation of left hip with intermedullary star on 12/4/2022 with Dr. Blair.  Patient tolerated procedure well.  Patient was transfused PRBCs perioperatively for acute blood loss anemia.  Patient was found to be in urinary retention, and a Leyva was placed. Patient failed subsequent trial of void, and patient to be sent to rehab facility with Leyva for new trial of void once patient is more ambulatory. Patient was evaluated postoperatively by physical and occupational therapists for weight bearing as tolerated and advised that patient would benefit from admission to rehab facility.  Patient advised to keep surgical incisions/dressings clean and dry, and have dressing removed post op day #15(12/19/2022).  Patient further advised to follow up with Dr. Blair in his office 3-4 weeks post op.         History of Present Illness:   Orthopedics Consult Note:    This is a 89y Female who presents to the ED today with c/o left hip pain and inability to bear weight s/p mechanical trip and fall today. Pt denies any other injuries. Pt denies head trauma or LOC. Pt denies any numbness, tingling or paresthesias. Pt is a home ambulator without use of any assistive devices at baseline.    Past Medical & Surgical History:    Macular degeneration  HLD  Hypertension  Dementia    HOSPITAL COURSE:  89 y/o FM underwent surgical fixation of left hip with intermedullary star on 12/4/2022 with Dr. Blair.  Patient tolerated procedure well.  Patient was transfused PRBCs perioperatively for acute blood loss anemia.  Patient was found to be in urinary retention, and a Leyva was placed. Patient failed subsequent trial of void, and patient to be sent to rehab facility with Leyva for new trial of void once patient is more ambulatory. Pt had afib with RVR 12/8 for which an echo was unremarkablle. metoprolol was increased to 75mg. Patient was evaluated postoperatively by physical and occupational therapists for weight bearing as tolerated and advised that patient would benefit from admission to rehab facility.  Patient advised to keep surgical incisions/dressings clean and dry, and have dressing removed post op day #15(12/19/2022).  Patient further advised to follow up with Dr. Blair in his office 3-4 weeks post op.         History of Present Illness:   Orthopedics Consult Note:    This is a 89y Female who presents to the ED today with c/o left hip pain and inability to bear weight s/p mechanical trip and fall today. Pt denies any other injuries. Pt denies head trauma or LOC. Pt denies any numbness, tingling or paresthesias. Pt is a home ambulator without use of any assistive devices at baseline.    Past Medical & Surgical History:    Macular degeneration  HLD  Hypertension  Dementia    HOSPITAL COURSE:  89 y/o FM underwent surgical fixation of left hip with intermedullary star on 12/4/2022 with Dr. Blair.  Patient tolerated procedure well.  Patient was transfused PRBCs perioperatively for acute blood loss anemia.  Patient was found to be in urinary retention, and a Leyva was placed. Patient failed subsequent trial of void, and patient to be sent to rehab facility with Leyva for new trial of void once patient is more ambulatory. Pt had afib with RVR 12/8 for which an echo was unremarkablle. metoprolol was increased to 75mg. Patient was evaluated postoperatively by physical and occupational therapists for weight bearing as tolerated and advised that patient would benefit from admission to rehab facility.  Patient advised to keep surgical incisions/dressings clean and dry, and have dressing removed post op day #15(12/19/2022). Patient with UTI and was treated with Rocephin in hospital, will transition to Ciprofloxacin PO to be continued at rehab. Patient further advised to follow up with Dr. Blair in his office 3-4 weeks post op.

## 2022-12-05 NOTE — PROGRESS NOTE ADULT - SUBJECTIVE AND OBJECTIVE BOX
Orthopaedic Surgery Progress Note    Subjective:   Patient seen and examined. No acute events overnight however remains on level 1 wrist restraints due to concern for self harm. Alert and oriented x1 this AM. States pain is controlled.     Objective:  T(C): 36.9 (12-05-22 @ 05:30), Max: 36.9 (12-04-22 @ 14:25)  HR: 90 (12-05-22 @ 05:30) (64 - 102)  BP: 144/73 (12-05-22 @ 05:30) (99/53 - 163/69)  RR: 18 (12-05-22 @ 05:30) (17 - 18)  SpO2: 94% (12-05-22 @ 05:30) (94% - 100%)  Wt(kg): --    12-04 @ 07:01  -  12-05 @ 06:33  --------------------------------------------------------  IN: 2895 mL / OUT: 2470 mL / NET: 425 mL        Physical Exam:    Gen: awake, alert, NAD  Resp: no increased work of breathing  LLE:  Aquacel ressing c/d/i   +EHL/FHL/TA/GS  SILT S/S/SP/DP  +DP/PT Pulses  Compartments soft  No calf TTP                           10.4   12.55 )-----------( 254      ( 04 Dec 2022 12:24 )             30.9     12-04    131<L>  |  96  |  10  ----------------------------<  122<H>  4.1   |  22  |  0.53    Ca    8.9      04 Dec 2022 12:24    TPro  6.3  /  Alb  4.2  /  TBili  0.6  /  DBili  x   /  AST  22  /  ALT  24  /  AlkPhos  84  12-04    PT/INR - ( 04 Dec 2022 02:11 )   PT: 12.6 sec;   INR: 1.09 ratio         PTT - ( 04 Dec 2022 02:11 )  PTT:27.6 sec

## 2022-12-05 NOTE — PROGRESS NOTE ADULT - ASSESSMENT
89 YO woman with a hx of HTN, HLD, Dementia, macular degeneration, Presents after a fall at home with a left hip fracture. Patient is s/p an Open reduction and internal fixation of the left hip

## 2022-12-05 NOTE — OCCUPATIONAL THERAPY INITIAL EVALUATION ADULT - PERTINENT HX OF CURRENT PROBLEM, REHAB EVAL
89F admitted to [facility] on 12/4/22  presents to the ED today with c/o left hip pain and inability to bear weight s/p mechanical trip and fall today. Pt denies any other injuries. Pt denies head trauma or LOC. Pt denies any numbness, tingling or paresthesias. Pt is a home ambulator without use of any assistive devices at baseline. XR pelvis : Comminuted intertrochanteric fracture of the left hip is present with varus angulation and approximately one half bone width posterior displacement of the diaphysis. Pt is now s/p L IMN on 12/4/22

## 2022-12-05 NOTE — PROGRESS NOTE ADULT - SUBJECTIVE AND OBJECTIVE BOX
89 yo female presenting after fall. pt states door was partially opened and tripped and fell onto her left side. afterward complained of pain to left leg and hip. Unable to ambulate since. No h/o prior injuries to leg. Denies any head trauma. no n/v. no chest pain or sob. is not on any blood thinners. Did not take anything for pain prior to arrival. Patient was brought to [facility] for further evaluation and treatment. In the ED she was found to have a left hip fracture and is s/p an Open reduction and internal fixation of the left hip. Patient seen now resting, confused. appears pain is well controlled.      MEDICATIONS  (STANDING):  acetaminophen   IVPB .. 600 milliGRAM(s) IV Intermittent once  atorvastatin 10 milliGRAM(s) Oral at bedtime  enoxaparin Injectable 40 milliGRAM(s) SubCutaneous every 24 hours  lisinopril 20 milliGRAM(s) Oral daily  metoprolol succinate ER 50 milliGRAM(s) Oral daily  polyethylene glycol 3350 17 Gram(s) Oral daily  senna 2 Tablet(s) Oral at bedtime  sodium chloride 0.9%. 1000 milliLiter(s) (125 mL/Hr) IV Continuous <Continuous>    MEDICATIONS  (PRN):  magnesium hydroxide Suspension 30 milliLiter(s) Oral daily PRN Constipation  melatonin 3 milliGRAM(s) Oral at bedtime PRN Insomnia  oxyCODONE    IR 2.5 milliGRAM(s) Oral every 4 hours PRN Moderate Pain (4 - 6)  oxyCODONE    IR 5 milliGRAM(s) Oral every 4 hours PRN Severe Pain (7 - 10)          VITALS:   T(C): 37.3 (12-05-22 @ 07:43), Max: 37.3 (12-05-22 @ 07:43)  HR: 73 (12-05-22 @ 07:43) (66 - 90)  BP: 138/72 (12-05-22 @ 07:43) (99/53 - 151/88)  RR: 18 (12-05-22 @ 07:43) (18 - 18)  SpO2: 94% (12-05-22 @ 07:43) (94% - 98%)  Wt(kg): --    PHYSICAL EXAM:  GENERAL: NAD, well-groomed, well-developed  HEAD:  Atraumatic, Normocephalic  EYES: EOMI, PERRLA, conjunctiva and sclera clear  ENMT: No tonsillar erythema, exudates, or enlargement; Moist mucous membranes, Good dentition, No lesions  NECK: Supple, No JVD, Normal thyroid  NERVOUS SYSTEM:  Alert & Oriented X1,  Motor Strength 5/5 B/L upper and lower extremities; DTRs 2+ intact and symmetric  CHEST/LUNG: Clear to percussion bilaterally; No rales, rhonchi, wheezing, or rubs  HEART: Regular rate and rhythm; No murmurs, rubs, or gallops  ABDOMEN: Soft, Nontender, Nondistended; Bowel sounds present  EXTREMITIES:  2+ Peripheral Pulses, No clubbing, cyanosis, or edema  LYMPH: No lymphadenopathy noted  SKIN: No rashes or lesions    LABS:        CBC Full  -  ( 05 Dec 2022 06:46 )  WBC Count : 13.20 K/uL  RBC Count : 2.91 M/uL  Hemoglobin : 8.9 g/dL  Hematocrit : 27.0 %  Platelet Count - Automated : 239 K/uL  Mean Cell Volume : 92.8 fl  Mean Cell Hemoglobin : 30.6 pg  Mean Cell Hemoglobin Concentration : 33.0 gm/dL  Auto Neutrophil # : x  Auto Lymphocyte # : x  Auto Monocyte # : x  Auto Eosinophil # : x  Auto Basophil # : x  Auto Neutrophil % : x  Auto Lymphocyte % : x  Auto Monocyte % : x  Auto Eosinophil % : x  Auto Basophil % : x    12-05    140  |  103  |  12  ----------------------------<  110<H>  3.8   |  23  |  0.61    Ca    9.0      05 Dec 2022 06:46    TPro  6.3  /  Alb  4.2  /  TBili  0.6  /  DBili  x   /  AST  22  /  ALT  24  /  AlkPhos  84  12-04    LIVER FUNCTIONS - ( 04 Dec 2022 02:11 )  Alb: 4.2 g/dL / Pro: 6.3 g/dL / ALK PHOS: 84 U/L / ALT: 24 U/L / AST: 22 U/L / GGT: x           PT/INR - ( 04 Dec 2022 02:11 )   PT: 12.6 sec;   INR: 1.09 ratio         PTT - ( 04 Dec 2022 02:11 )  PTT:27.6 sec    CAPILLARY BLOOD GLUCOSE          RADIOLOGY & ADDITIONAL TESTS:

## 2022-12-05 NOTE — DISCHARGE NOTE PROVIDER - NSDCFUADDINST_GEN_ALL_CORE_FT
Continue weight bearing as tolerated ambulation with rolling walker. Keep surgical incisions/dressings clean and dry, remove dressings post operative day #15(12/19/2022) Follow up with Dr. Blair in his office post 3-4 weeks post op

## 2022-12-05 NOTE — DISCHARGE NOTE PROVIDER - NSDCCPCAREPLAN_GEN_ALL_CORE_FT
PRINCIPAL DISCHARGE DIAGNOSIS  Diagnosis: Intertrochanteric fracture of left hip  Assessment and Plan of Treatment:

## 2022-12-06 LAB
ANION GAP SERPL CALC-SCNC: 10 MMOL/L — SIGNIFICANT CHANGE UP (ref 5–17)
BUN SERPL-MCNC: 9 MG/DL — SIGNIFICANT CHANGE UP (ref 7–23)
CALCIUM SERPL-MCNC: 8.6 MG/DL — SIGNIFICANT CHANGE UP (ref 8.4–10.5)
CHLORIDE SERPL-SCNC: 107 MMOL/L — SIGNIFICANT CHANGE UP (ref 96–108)
CO2 SERPL-SCNC: 23 MMOL/L — SIGNIFICANT CHANGE UP (ref 22–31)
CREAT SERPL-MCNC: 0.49 MG/DL — LOW (ref 0.5–1.3)
EGFR: 88 ML/MIN/1.73M2 — SIGNIFICANT CHANGE UP
GLUCOSE SERPL-MCNC: 99 MG/DL — SIGNIFICANT CHANGE UP (ref 70–99)
HCT VFR BLD CALC: 23.2 % — LOW (ref 34.5–45)
HGB BLD-MCNC: 7.7 G/DL — LOW (ref 11.5–15.5)
MCHC RBC-ENTMCNC: 30.9 PG — SIGNIFICANT CHANGE UP (ref 27–34)
MCHC RBC-ENTMCNC: 33.2 GM/DL — SIGNIFICANT CHANGE UP (ref 32–36)
MCV RBC AUTO: 93.2 FL — SIGNIFICANT CHANGE UP (ref 80–100)
NRBC # BLD: 0 /100 WBCS — SIGNIFICANT CHANGE UP (ref 0–0)
PLATELET # BLD AUTO: 208 K/UL — SIGNIFICANT CHANGE UP (ref 150–400)
POTASSIUM SERPL-MCNC: 3.5 MMOL/L — SIGNIFICANT CHANGE UP (ref 3.5–5.3)
POTASSIUM SERPL-SCNC: 3.5 MMOL/L — SIGNIFICANT CHANGE UP (ref 3.5–5.3)
RBC # BLD: 2.49 M/UL — LOW (ref 3.8–5.2)
RBC # FLD: 13.4 % — SIGNIFICANT CHANGE UP (ref 10.3–14.5)
SODIUM SERPL-SCNC: 140 MMOL/L — SIGNIFICANT CHANGE UP (ref 135–145)
WBC # BLD: 9.01 K/UL — SIGNIFICANT CHANGE UP (ref 3.8–10.5)
WBC # FLD AUTO: 9.01 K/UL — SIGNIFICANT CHANGE UP (ref 3.8–10.5)

## 2022-12-06 RX ORDER — ACETAMINOPHEN 500 MG
600 TABLET ORAL ONCE
Refills: 0 | Status: COMPLETED | OUTPATIENT
Start: 2022-12-06 | End: 2022-12-07

## 2022-12-06 RX ORDER — HYDRALAZINE HCL 50 MG
5 TABLET ORAL ONCE
Refills: 0 | Status: COMPLETED | OUTPATIENT
Start: 2022-12-06 | End: 2022-12-06

## 2022-12-06 RX ADMIN — Medication 975 MILLIGRAM(S): at 13:35

## 2022-12-06 RX ADMIN — Medication 975 MILLIGRAM(S): at 05:28

## 2022-12-06 RX ADMIN — Medication 5 MILLIGRAM(S): at 22:25

## 2022-12-06 RX ADMIN — Medication 50 MILLIGRAM(S): at 05:27

## 2022-12-06 RX ADMIN — Medication 975 MILLIGRAM(S): at 05:58

## 2022-12-06 RX ADMIN — Medication 975 MILLIGRAM(S): at 14:35

## 2022-12-06 RX ADMIN — ENOXAPARIN SODIUM 40 MILLIGRAM(S): 100 INJECTION SUBCUTANEOUS at 05:28

## 2022-12-06 RX ADMIN — LISINOPRIL 20 MILLIGRAM(S): 2.5 TABLET ORAL at 05:28

## 2022-12-06 NOTE — CHART NOTE - NSCHARTNOTEFT_GEN_A_CORE
Nurse taking care of pt reported elevated BP of 187/73 (taken twice on both arms) after patient was placed back on restraints for agitation. 5 mg Hydralazine IV push was ordered and BP will remeasured by the nurse. Pt not currently on running fluids. Pt seen at bedside and continues to be agitated and does not wish to calm down. Pt claims that we are "trying to kill her." Will continue to monitor BP after administration of meds.      Car Benitez PA-C  Orthopedic Surgery  Pager #[phone] Nurse taking care of pt reported elevated BP of 187/73 (taken twice on both arms) after patient was placed back on restraints for agitation. 5 mg Hydralazine IV push was ordered and BP will remeasured by the nurse. Pt not currently on running fluids. Pt seen at bedside and continues to be agitated and does not wish to calm down. Will continue to monitor BP after administration of meds.      Car Benitez PA-C  Orthopedic Surgery  Pager #[phone]

## 2022-12-06 NOTE — PROGRESS NOTE ADULT - SUBJECTIVE AND OBJECTIVE BOX
89 yo female presenting after fall. pt states door was partially opened and tripped and fell onto her left side. afterward complained of pain to left leg and hip. Unable to ambulate since. No h/o prior injuries to leg. Denies any head trauma. no n/v. no chest pain or sob. is not on any blood thinners. Did not take anything for pain prior to arrival. Patient was brought to [facility] for further evaluation and treatment. In the ED she was found to have a left hip fracture and is s/p an Open reduction and internal fixation of the left hip. Patient seen now resting, confused. appears pain is well controlled. Patient remains confused.      MEDICATIONS  (STANDING):  acetaminophen     Tablet .. 975 milliGRAM(s) Oral every 8 hours  atorvastatin 10 milliGRAM(s) Oral at bedtime  enoxaparin Injectable 40 milliGRAM(s) SubCutaneous every 24 hours  influenza  Vaccine (HIGH DOSE) 0.7 milliLiter(s) IntraMuscular once  lisinopril 20 milliGRAM(s) Oral daily  metoprolol succinate ER 50 milliGRAM(s) Oral daily  polyethylene glycol 3350 17 Gram(s) Oral daily  senna 2 Tablet(s) Oral at bedtime  sodium chloride 0.9%. 1000 milliLiter(s) (125 mL/Hr) IV Continuous <Continuous>    MEDICATIONS  (PRN):  bisacodyl Suppository 10 milliGRAM(s) Rectal daily PRN If no bowel movement  magnesium hydroxide Suspension 30 milliLiter(s) Oral daily PRN Constipation  melatonin 3 milliGRAM(s) Oral at bedtime PRN Insomnia  oxyCODONE    IR 2.5 milliGRAM(s) Oral every 4 hours PRN Moderate Pain (4 - 6)  oxyCODONE    IR 5 milliGRAM(s) Oral every 4 hours PRN Severe Pain (7 - 10)          VITALS:   T(C): 36.6 (12-06-22 @ 04:50), Max: 36.8 (12-05-22 @ 20:18)  HR: 77 (12-06-22 @ 04:50) (65 - 77)  BP: 132/72 (12-06-22 @ 04:50) (103/54 - 149/67)  RR: 18 (12-06-22 @ 04:50) (18 - 18)  SpO2: 93% (12-06-22 @ 04:50) (93% - 96%)  Wt(kg): --    PHYSICAL EXAM:  GENERAL: NAD, well-groomed, well-developed  HEAD:  Atraumatic, Normocephalic  EYES: EOMI, PERRLA, conjunctiva and sclera clear  ENMT: No tonsillar erythema, exudates, or enlargement; Moist mucous membranes, Good dentition, No lesions  NECK: Supple, No JVD, Normal thyroid  NERVOUS SYSTEM:  Alert & Oriented X1,  Motor Strength 5/5 B/L upper and lower extremities; DTRs 2+ intact and symmetric  CHEST/LUNG: Clear to percussion bilaterally; No rales, rhonchi, wheezing, or rubs  HEART: Regular rate and rhythm; No murmurs, rubs, or gallops  ABDOMEN: Soft, Nontender, Nondistended; Bowel sounds present  EXTREMITIES:  2+ Peripheral Pulses, No clubbing, cyanosis, or edema  LYMPH: No lymphadenopathy noted  SKIN: No rashes or lesions    LABS:        CBC Full  -  ( 06 Dec 2022 06:42 )  WBC Count : 9.01 K/uL  RBC Count : 2.49 M/uL  Hemoglobin : 7.7 g/dL  Hematocrit : 23.2 %  Platelet Count - Automated : 208 K/uL  Mean Cell Volume : 93.2 fl  Mean Cell Hemoglobin : 30.9 pg  Mean Cell Hemoglobin Concentration : 33.2 gm/dL  Auto Neutrophil # : x  Auto Lymphocyte # : x  Auto Monocyte # : x  Auto Eosinophil # : x  Auto Basophil # : x  Auto Neutrophil % : x  Auto Lymphocyte % : x  Auto Monocyte % : x  Auto Eosinophil % : x  Auto Basophil % : x    12-06    140  |  107  |  9   ----------------------------<  99  3.5   |  23  |  0.49<L>    Ca    8.6      06 Dec 2022 06:41            CAPILLARY BLOOD GLUCOSE          RADIOLOGY & ADDITIONAL TESTS:

## 2022-12-06 NOTE — CHART NOTE - NSCHARTNOTEFT_GEN_A_CORE
RN called provider bedside stating patient agitated, pulling at Leyva catheter and trying to get OOB.  Patient seen bedside who is confused at baseline A&Ox1 (name), with Hx of dementia.  Patient reports wanting leave and not knowing where she is.  Pt refused to remain in bed and continued to pull at Leyva.  Patient ordered for wrist restraint to prevent self harm.

## 2022-12-06 NOTE — PROVIDER CONTACT NOTE (OTHER) - REASON
Pt is screaming, attempting to pull out beard, pulled out IV,  pulled out L hip aquacel dsg. Continues to attempt to get OOB.

## 2022-12-06 NOTE — PROGRESS NOTE ADULT - TIME BILLING
Discussed treatment plan with staff and patient at bedside.  I am a non participating [employer] physician seeing Pt in coverage for Dr Villagomez Discussed treatment plan with staff and patient at bedside.  I am a non participating [employer] physician seeing Pt in coverage for Dr Villagomez. The above patient documentation by Dr. Pritchett was reviewed and I agree with his evaluation, assessment and treatment plan.  Matthew Villagomez M.D.

## 2022-12-06 NOTE — PROVIDER CONTACT NOTE (OTHER) - ASSESSMENT
AxOx1. Pt continues to scream and says we are performing an experiment on her. Continues to try to get OOB.

## 2022-12-06 NOTE — PROGRESS NOTE ADULT - SUBJECTIVE AND OBJECTIVE BOX
Patient resting without complaints.  No chest pain, SOB, N/V.    T(C): 36.6 (12-06-22 @ 04:50), Max: 37.3 (12-05-22 @ 07:43)  HR: 77 (12-06-22 @ 04:50) (65 - 77)  BP: 132/72 (12-06-22 @ 04:50) (103/54 - 149/67)  RR: 18 (12-06-22 @ 04:50) (18 - 18)  SpO2: 93% (12-06-22 @ 04:50) (93% - 96%)      Exam:  Awake, confused to place and time  Lower Extremities:  LLE: Aquacel dressings c/d/i, compartments soft, dull sensation grossly intact  Calves Soft, Non-tender bilaterally  +PF/DF/EHL/FHL  SILT  +DP Pulse    Labs:                        8.9    13.20 )-----------( 239      ( 05 Dec 2022 06:46 )             27.0    12-05    140  |  103  |  12  ----------------------------<  110<H>  3.8   |  23  |  0.61    Ca    9.0      05 Dec 2022 06:46

## 2022-12-06 NOTE — PROGRESS NOTE ADULT - ASSESSMENT
89 YO woman with a hx of HTN, HLD, Dementia, macular degeneration, Presents after a fall at home with a left hip fracture. Patient is s/p an Open reduction and internal fixation of the left hip

## 2022-12-06 NOTE — PROGRESS NOTE ADULT - ASSESSMENT
A/P 89 y/o F POD#2 s/p L IM Nail    DVT ppx- Lovenox  LLE WBAT  PT  Pain management prn  TOV s/p SC overnight  FU AM labs  Discharge planning to [facility]/w attending      TARUN Lundy  Orthopedic Surgery  [id number]

## 2022-12-07 LAB
ANION GAP SERPL CALC-SCNC: 13 MMOL/L — SIGNIFICANT CHANGE UP (ref 5–17)
BLD GP AB SCN SERPL QL: NEGATIVE — SIGNIFICANT CHANGE UP
BUN SERPL-MCNC: 8 MG/DL — SIGNIFICANT CHANGE UP (ref 7–23)
CALCIUM SERPL-MCNC: 9 MG/DL — SIGNIFICANT CHANGE UP (ref 8.4–10.5)
CHLORIDE SERPL-SCNC: 105 MMOL/L — SIGNIFICANT CHANGE UP (ref 96–108)
CO2 SERPL-SCNC: 22 MMOL/L — SIGNIFICANT CHANGE UP (ref 22–31)
CREAT SERPL-MCNC: 0.45 MG/DL — LOW (ref 0.5–1.3)
EGFR: 90 ML/MIN/1.73M2 — SIGNIFICANT CHANGE UP
GLUCOSE SERPL-MCNC: 106 MG/DL — HIGH (ref 70–99)
HCT VFR BLD CALC: 28.4 % — LOW (ref 34.5–45)
HGB BLD-MCNC: 9.5 G/DL — LOW (ref 11.5–15.5)
MCHC RBC-ENTMCNC: 30.5 PG — SIGNIFICANT CHANGE UP (ref 27–34)
MCHC RBC-ENTMCNC: 33.5 GM/DL — SIGNIFICANT CHANGE UP (ref 32–36)
MCV RBC AUTO: 91.3 FL — SIGNIFICANT CHANGE UP (ref 80–100)
NRBC # BLD: 0 /100 WBCS — SIGNIFICANT CHANGE UP (ref 0–0)
PLATELET # BLD AUTO: 270 K/UL — SIGNIFICANT CHANGE UP (ref 150–400)
POTASSIUM SERPL-MCNC: 3.3 MMOL/L — LOW (ref 3.5–5.3)
POTASSIUM SERPL-SCNC: 3.3 MMOL/L — LOW (ref 3.5–5.3)
RBC # BLD: 3.11 M/UL — LOW (ref 3.8–5.2)
RBC # FLD: 14.4 % — SIGNIFICANT CHANGE UP (ref 10.3–14.5)
RH IG SCN BLD-IMP: POSITIVE — SIGNIFICANT CHANGE UP
SARS-COV-2 RNA SPEC QL NAA+PROBE: SIGNIFICANT CHANGE UP
SODIUM SERPL-SCNC: 140 MMOL/L — SIGNIFICANT CHANGE UP (ref 135–145)
WBC # BLD: 11.16 K/UL — HIGH (ref 3.8–10.5)
WBC # FLD AUTO: 11.16 K/UL — HIGH (ref 3.8–10.5)

## 2022-12-07 RX ORDER — QUETIAPINE FUMARATE 200 MG/1
12.5 TABLET, FILM COATED ORAL DAILY
Refills: 0 | Status: DISCONTINUED | OUTPATIENT
Start: 2022-12-07 | End: 2022-12-16

## 2022-12-07 RX ORDER — POTASSIUM CHLORIDE 20 MEQ
20 PACKET (EA) ORAL ONCE
Refills: 0 | Status: COMPLETED | OUTPATIENT
Start: 2022-12-07 | End: 2022-12-08

## 2022-12-07 RX ORDER — QUETIAPINE FUMARATE 200 MG/1
12.5 TABLET, FILM COATED ORAL AT BEDTIME
Refills: 0 | Status: DISCONTINUED | OUTPATIENT
Start: 2022-12-07 | End: 2022-12-16

## 2022-12-07 RX ADMIN — Medication 600 MILLIGRAM(S): at 00:40

## 2022-12-07 RX ADMIN — ATORVASTATIN CALCIUM 10 MILLIGRAM(S): 80 TABLET, FILM COATED ORAL at 21:11

## 2022-12-07 RX ADMIN — Medication 50 MILLIGRAM(S): at 05:03

## 2022-12-07 RX ADMIN — Medication 975 MILLIGRAM(S): at 21:11

## 2022-12-07 RX ADMIN — Medication 975 MILLIGRAM(S): at 21:41

## 2022-12-07 RX ADMIN — ENOXAPARIN SODIUM 40 MILLIGRAM(S): 100 INJECTION SUBCUTANEOUS at 05:24

## 2022-12-07 RX ADMIN — QUETIAPINE FUMARATE 12.5 MILLIGRAM(S): 200 TABLET, FILM COATED ORAL at 21:11

## 2022-12-07 RX ADMIN — LISINOPRIL 20 MILLIGRAM(S): 2.5 TABLET ORAL at 05:03

## 2022-12-07 RX ADMIN — Medication 240 MILLIGRAM(S): at 00:05

## 2022-12-07 NOTE — PROGRESS NOTE ADULT - ASSESSMENT
Impression: Stable       Plan:   Continue present treatment                 Out of bed, ambulate, weight bearing as tolerated                 Physical therapy follow up                 Continue to monitor    Cruz Mota PA-C  Orthopaedic Surgery  Team pager [phone]  [phone]

## 2022-12-07 NOTE — DIETITIAN NUTRITION RISK NOTIFICATION - TREATMENT: THE FOLLOWING DIET HAS BEEN RECOMMENDED
Diet, Regular:   Supplement Feeding Modality:  Oral  Ensure Enlive Cans or Servings Per Day:  2       Frequency:  Daily (12-07-22 @ 11:42) [Active]

## 2022-12-07 NOTE — PROGRESS NOTE ADULT - TIME BILLING
Discussed treatment plan with daughter and patient at bedside.  DC planning to rehab  I am a non participating [employer] physician seeing Pt in coverage for Dr Villagomez. The above patient documentation by Dr. Pritchett was reviewed and I agree with his evaluation, assessment and treatment plan.  aMtthew Villagomez M.D.

## 2022-12-07 NOTE — DIETITIAN INITIAL EVALUATION ADULT - CONTINUE CURRENT NUTRITION CARE PLAN
Continue current diet regimen: regular diet as appropriate. Monitor tolerance, PO intake, and adjust as needed./yes

## 2022-12-07 NOTE — DIETITIAN INITIAL EVALUATION ADULT - NSFNSPHYEXAMSKINFT_GEN_A_CORE
Pt states UBW ~105-115 pounds; noted dosing wt 89.9 pounds; ?22% wt loss (unclear time frame)  90% IBW ( pounds)  Skin: no noted pressure injuries as per flowsheets

## 2022-12-07 NOTE — PROGRESS NOTE ADULT - SUBJECTIVE AND OBJECTIVE BOX
89 yo female presenting after fall. pt states door was partially opened and tripped and fell onto her left side. afterward complained of pain to left leg and hip. Unable to ambulate since. No h/o prior injuries to leg. Denies any head trauma. no n/v. no chest pain or sob. is not on any blood thinners. Did not take anything for pain prior to arrival. Patient was brought to [facility] for further evaluation and treatment. In the ED she was found to have a left hip fracture and is s/p an Open reduction and internal fixation of the left hip. Patient seen now resting comfortably. appears pain is well controlled. Patient remains confused.        MEDICATIONS  (STANDING):  acetaminophen     Tablet .. 975 milliGRAM(s) Oral every 8 hours  atorvastatin 10 milliGRAM(s) Oral at bedtime  enoxaparin Injectable 40 milliGRAM(s) SubCutaneous every 24 hours  influenza  Vaccine (HIGH DOSE) 0.7 milliLiter(s) IntraMuscular once  lisinopril 20 milliGRAM(s) Oral daily  metoprolol succinate ER 50 milliGRAM(s) Oral daily  polyethylene glycol 3350 17 Gram(s) Oral daily  potassium chloride    Tablet ER 20 milliEquivalent(s) Oral once  QUEtiapine 12.5 milliGRAM(s) Oral at bedtime  senna 2 Tablet(s) Oral at bedtime  sodium chloride 0.9%. 1000 milliLiter(s) (125 mL/Hr) IV Continuous <Continuous>    MEDICATIONS  (PRN):  bisacodyl Suppository 10 milliGRAM(s) Rectal daily PRN If no bowel movement  magnesium hydroxide Suspension 30 milliLiter(s) Oral daily PRN Constipation  melatonin 3 milliGRAM(s) Oral at bedtime PRN Insomnia  QUEtiapine 12.5 milliGRAM(s) Oral daily PRN agitation          VITALS:   T(C): 36.7 (12-07-22 @ 16:27), Max: 36.7 (12-07-22 @ 13:17)  HR: 75 (12-07-22 @ 16:27) (72 - 86)  BP: 149/70 (12-07-22 @ 16:27) (137/57 - 187/75)  RR: 18 (12-07-22 @ 16:27) (18 - 20)  SpO2: 94% (12-07-22 @ 16:27) (93% - 96%)  Wt(kg): --    PHYSICAL EXAM:  GENERAL: NAD, well-groomed, well-developed  HEAD:  Atraumatic, Normocephalic  EYES: EOMI, PERRLA, conjunctiva and sclera clear  ENMT: No tonsillar erythema, exudates, or enlargement; Moist mucous membranes, Good dentition, No lesions  NECK: Supple, No JVD, Normal thyroid  NERVOUS SYSTEM:  Alert & Oriented X1,  Motor Strength 5/5 B/L upper and lower extremities; DTRs 2+ intact and symmetric  CHEST/LUNG: Clear to percussion bilaterally; No rales, rhonchi, wheezing, or rubs  HEART: Regular rate and rhythm; No murmurs, rubs, or gallops  ABDOMEN: Soft, Nontender, Nondistended; Bowel sounds present  EXTREMITIES:  2+ Peripheral Pulses, No clubbing, cyanosis, or edema  LYMPH: No lymphadenopathy noted  SKIN: No rashes or lesions    LABS:        CBC Full  -  ( 07 Dec 2022 09:36 )  WBC Count : 11.16 K/uL  RBC Count : 3.11 M/uL  Hemoglobin : 9.5 g/dL  Hematocrit : 28.4 %  Platelet Count - Automated : 270 K/uL  Mean Cell Volume : 91.3 fl  Mean Cell Hemoglobin : 30.5 pg  Mean Cell Hemoglobin Concentration : 33.5 gm/dL  Auto Neutrophil # : x  Auto Lymphocyte # : x  Auto Monocyte # : x  Auto Eosinophil # : x  Auto Basophil # : x  Auto Neutrophil % : x  Auto Lymphocyte % : x  Auto Monocyte % : x  Auto Eosinophil % : x  Auto Basophil % : x    12-07    140  |  105  |  8   ----------------------------<  106<H>  3.3<L>   |  22  |  0.45<L>    Ca    9.0      07 Dec 2022 09:36            CAPILLARY BLOOD GLUCOSE          RADIOLOGY & ADDITIONAL TESTS:

## 2022-12-07 NOTE — DIETITIAN INITIAL EVALUATION ADULT - OTHER CALCULATIONS
Dosing wt 89.9 pounds used for calorie and fluid needs calculation; IBW (BMI <19) 90 pounds for protein needs calculations.  Dosing wt 89.9 pounds used for calorie and fluid needs calculation; IBW-10% (BMI <19) 90 pounds for protein needs calculations.

## 2022-12-07 NOTE — DIETITIAN INITIAL EVALUATION ADULT - LAB (SPECIFY)
- Noted K+ 3.3<L>; defer replenish to team  - Trend BG levels, renal indices, LFT's, electrolytes and triglycerides

## 2022-12-07 NOTE — PROVIDER CONTACT NOTE (OTHER) - ASSESSMENT
Pt noted to have elevated HR ranging from 105-130 bpm. Pt is confused and anxious. Pt stated, "I feel like I am going to fall from this height." Pt was reoriented by RN and family member that she was safe in the hospital.

## 2022-12-07 NOTE — DIETITIAN INITIAL EVALUATION ADULT - PERTINENT LABORATORY DATA
12-07    140  |  105  |  8   ----------------------------<  106<H>  3.3<L>   |  22  |  0.45<L>    Ca    9.0      07 Dec 2022 09:36     12-07    140  |  105  |  8   ----------------------------<  106<H>  3.3<L>   |  22  |  0.45<L>    Ca    9.0      07 Dec 2022 09:36    12-07 @ 09:36: Na 140, BUN 8, Cr 0.45<L>, <H>, K+ 3.3<L>, Phos --, Mg --, Alk Phos --, ALT/SGPT --, AST/SGOT --, HbA1c --

## 2022-12-07 NOTE — DIETITIAN INITIAL EVALUATION ADULT - OTHER INFO
Home Medications:  acetaminophen 325 mg oral tablet: 3 tab(s) orally every 8 hours (05 Dec 2022 20:54)  atorvastatin 10 mg oral tablet: 1 tab(s) orally once a day (at bedtime) (04 Dec 2022 15:03)  enoxaparin: 40 milligram(s) subcutaneous once a day x 6 weeks post operatively for DVT ppx (05 Dec 2022 20:51)  magnesium hydroxide 8% oral suspension: 30 milliliter(s) orally once a day, As needed, Constipation (05 Dec 2022 20:51)  melatonin 3 mg oral tablet: 1 tab(s) orally once a day (at bedtime), As needed, Insomnia (05 Dec 2022 20:51)  Metoprolol Succinate ER 50 mg oral tablet, extended release: 1 tab(s) orally once a day (04 Dec 2022 15:04)  oxyCODONE: 2.5 milligram(s) orally every 4 hours as needed for moderate pain (05 Dec 2022 20:54)  oxyCODONE 5 mg oral tablet: 1 tab(s) orally every 4 hours, As needed, Severe Pain (7 - 10) (05 Dec 2022 20:54)  polyethylene glycol 3350 oral powder for reconstitution: 17 gram(s) orally once a day (05 Dec 2022 20:51)  ramipril 5 mg oral tablet: 1 tab(s) orally 2 times a day (04 Dec 2022 15:03)  senna leaf extract oral tablet: 2 tab(s) orally once a day (at bedtime) (05 Dec 2022 20:51)

## 2022-12-07 NOTE — DIETITIAN INITIAL EVALUATION ADULT - ADD RECOMMEND
- Will continue to monitor PO intake, weight, labs, skin, GI status, diet.   - Recommend Multivitamin daily, if not medically contraindicated   - Malnutrition sticker placed, RD to follow-up as per protocol  - Nutrition care plan to remain consistent with pt goals of care  - Food preferences obtained and updated. Menu provided. RD to honor as able   - RD remains available to review diet education and adjust diet recommendations as needed.

## 2022-12-07 NOTE — DIETITIAN INITIAL EVALUATION ADULT - SIGNS/SYMPTOMS
+2 edema, mild-mod muscle fat loss, sudden unintentional wt loss PTA Pt seen for meeting BMI <19 kg/m2 criteria

## 2022-12-07 NOTE — DIETITIAN INITIAL EVALUATION ADULT - REASON INDICATOR FOR ASSESSMENT
Pt seen for meeting BMI <19 kg/m2 criteria   Information obtained from pt, electronic medical record  Pt seen for meeting BMI <19 kg/m2 criteria   Information obtained from pt, electronic medical record

## 2022-12-07 NOTE — DIETITIAN INITIAL EVALUATION ADULT - ORAL NUTRITION SUPPLEMENTS
- Recommend adding Ensure Plus High Protein 2x/day (nutritionally similar to Ensure Enlive - currently unavailable from ; 350 kcal and 20 g protein in each).

## 2022-12-07 NOTE — DIETITIAN INITIAL EVALUATION ADULT - NS FNS DIET ORDER
Diet, Regular:   Supplement Feeding Modality:  Oral  Ensure Enlive Cans or Servings Per Day:  2       Frequency:  Daily (12-07-22 @ 11:42)

## 2022-12-07 NOTE — DIETITIAN INITIAL EVALUATION ADULT - ORAL INTAKE PTA/DIET HISTORY
Pt reports endorses fair-good appetite and PO intake PTA. Reports not following specific diet but " try to eat healthy and watch my wt" pt also reports "never been a big eater, and eat small meals" pt confirmed no known food allergies/ food intolerances

## 2022-12-07 NOTE — DIETITIAN INITIAL EVALUATION ADULT - PERTINENT MEDS FT
MEDICATIONS  (STANDING):  acetaminophen     Tablet .. 975 milliGRAM(s) Oral every 8 hours  atorvastatin 10 milliGRAM(s) Oral at bedtime  enoxaparin Injectable 40 milliGRAM(s) SubCutaneous every 24 hours  influenza  Vaccine (HIGH DOSE) 0.7 milliLiter(s) IntraMuscular once  lisinopril 20 milliGRAM(s) Oral daily  metoprolol succinate ER 50 milliGRAM(s) Oral daily  polyethylene glycol 3350 17 Gram(s) Oral daily  potassium chloride    Tablet ER 20 milliEquivalent(s) Oral once  QUEtiapine 12.5 milliGRAM(s) Oral at bedtime  senna 2 Tablet(s) Oral at bedtime  sodium chloride 0.9%. 1000 milliLiter(s) (125 mL/Hr) IV Continuous <Continuous>    MEDICATIONS  (PRN):  bisacodyl Suppository 10 milliGRAM(s) Rectal daily PRN If no bowel movement  magnesium hydroxide Suspension 30 milliLiter(s) Oral daily PRN Constipation  melatonin 3 milliGRAM(s) Oral at bedtime PRN Insomnia  QUEtiapine 12.5 milliGRAM(s) Oral daily PRN agitation

## 2022-12-07 NOTE — DIETITIAN INITIAL EVALUATION ADULT - NSFNSGIIOFT_GEN_A_CORE
- Pt denies nausea, vomiting, diarrhea, or constipation.   - Last BM: this am, per pt ; currently ordered for bowel regimen (senna, miralax)

## 2022-12-08 DIAGNOSIS — I48.91 UNSPECIFIED ATRIAL FIBRILLATION: ICD-10-CM

## 2022-12-08 LAB
A1C WITH ESTIMATED AVERAGE GLUCOSE RESULT: 5.6 % — SIGNIFICANT CHANGE UP (ref 4–5.6)
ANION GAP SERPL CALC-SCNC: 12 MMOL/L — SIGNIFICANT CHANGE UP (ref 5–17)
BUN SERPL-MCNC: 15 MG/DL — SIGNIFICANT CHANGE UP (ref 7–23)
CALCIUM SERPL-MCNC: 9.3 MG/DL — SIGNIFICANT CHANGE UP (ref 8.4–10.5)
CHLORIDE SERPL-SCNC: 103 MMOL/L — SIGNIFICANT CHANGE UP (ref 96–108)
CHOLEST SERPL-MCNC: 103 MG/DL — SIGNIFICANT CHANGE UP
CO2 SERPL-SCNC: 23 MMOL/L — SIGNIFICANT CHANGE UP (ref 22–31)
CREAT SERPL-MCNC: 0.59 MG/DL — SIGNIFICANT CHANGE UP (ref 0.5–1.3)
EGFR: 84 ML/MIN/1.73M2 — SIGNIFICANT CHANGE UP
ESTIMATED AVERAGE GLUCOSE: 114 MG/DL — SIGNIFICANT CHANGE UP (ref 68–114)
GLUCOSE SERPL-MCNC: 110 MG/DL — HIGH (ref 70–99)
HCT VFR BLD CALC: 25.7 % — LOW (ref 34.5–45)
HDLC SERPL-MCNC: 48 MG/DL — LOW
HGB BLD-MCNC: 8.8 G/DL — LOW (ref 11.5–15.5)
LIPID PNL WITH DIRECT LDL SERPL: 41 MG/DL — SIGNIFICANT CHANGE UP
MAGNESIUM SERPL-MCNC: 1.8 MG/DL — SIGNIFICANT CHANGE UP (ref 1.6–2.6)
MCHC RBC-ENTMCNC: 31.1 PG — SIGNIFICANT CHANGE UP (ref 27–34)
MCHC RBC-ENTMCNC: 34.2 GM/DL — SIGNIFICANT CHANGE UP (ref 32–36)
MCV RBC AUTO: 90.8 FL — SIGNIFICANT CHANGE UP (ref 80–100)
NON HDL CHOLESTEROL: 55 MG/DL — SIGNIFICANT CHANGE UP
NRBC # BLD: 0 /100 WBCS — SIGNIFICANT CHANGE UP (ref 0–0)
PLATELET # BLD AUTO: 272 K/UL — SIGNIFICANT CHANGE UP (ref 150–400)
POTASSIUM SERPL-MCNC: 3.3 MMOL/L — LOW (ref 3.5–5.3)
POTASSIUM SERPL-SCNC: 3.3 MMOL/L — LOW (ref 3.5–5.3)
RBC # BLD: 2.83 M/UL — LOW (ref 3.8–5.2)
RBC # FLD: 14.2 % — SIGNIFICANT CHANGE UP (ref 10.3–14.5)
SODIUM SERPL-SCNC: 138 MMOL/L — SIGNIFICANT CHANGE UP (ref 135–145)
TRIGL SERPL-MCNC: 70 MG/DL — SIGNIFICANT CHANGE UP
TSH SERPL-MCNC: 3.07 UIU/ML — SIGNIFICANT CHANGE UP (ref 0.27–4.2)
WBC # BLD: 12.11 K/UL — HIGH (ref 3.8–10.5)
WBC # FLD AUTO: 12.11 K/UL — HIGH (ref 3.8–10.5)

## 2022-12-08 PROCEDURE — 93010 ELECTROCARDIOGRAM REPORT: CPT | Mod: 76,77

## 2022-12-08 PROCEDURE — 99222 1ST HOSP IP/OBS MODERATE 55: CPT | Mod: GC

## 2022-12-08 PROCEDURE — 93306 TTE W/DOPPLER COMPLETE: CPT | Mod: 26

## 2022-12-08 PROCEDURE — 93010 ELECTROCARDIOGRAM REPORT: CPT

## 2022-12-08 RX ORDER — CHLORHEXIDINE GLUCONATE 213 G/1000ML
1 SOLUTION TOPICAL
Refills: 0 | Status: DISCONTINUED | OUTPATIENT
Start: 2022-12-08 | End: 2022-12-16

## 2022-12-08 RX ORDER — METOPROLOL TARTRATE 50 MG
5 TABLET ORAL ONCE
Refills: 0 | Status: COMPLETED | OUTPATIENT
Start: 2022-12-08 | End: 2022-12-08

## 2022-12-08 RX ORDER — SODIUM CHLORIDE 9 MG/ML
500 INJECTION, SOLUTION INTRAVENOUS ONCE
Refills: 0 | Status: COMPLETED | OUTPATIENT
Start: 2022-12-08 | End: 2022-12-08

## 2022-12-08 RX ORDER — METOPROLOL TARTRATE 50 MG
25 TABLET ORAL ONCE
Refills: 0 | Status: COMPLETED | OUTPATIENT
Start: 2022-12-08 | End: 2022-12-08

## 2022-12-08 RX ORDER — POTASSIUM CHLORIDE 20 MEQ
20 PACKET (EA) ORAL
Refills: 0 | Status: COMPLETED | OUTPATIENT
Start: 2022-12-08 | End: 2022-12-08

## 2022-12-08 RX ORDER — METOPROLOL TARTRATE 50 MG
75 TABLET ORAL DAILY
Refills: 0 | Status: DISCONTINUED | OUTPATIENT
Start: 2022-12-09 | End: 2022-12-09

## 2022-12-08 RX ORDER — SODIUM CHLORIDE 9 MG/ML
500 INJECTION INTRAMUSCULAR; INTRAVENOUS; SUBCUTANEOUS ONCE
Refills: 0 | Status: COMPLETED | OUTPATIENT
Start: 2022-12-08 | End: 2022-12-08

## 2022-12-08 RX ORDER — METOPROLOL TARTRATE 50 MG
5 TABLET ORAL ONCE
Refills: 0 | Status: DISCONTINUED | OUTPATIENT
Start: 2022-12-08 | End: 2022-12-08

## 2022-12-08 RX ORDER — APIXABAN 2.5 MG/1
2.5 TABLET, FILM COATED ORAL EVERY 12 HOURS
Refills: 0 | Status: DISCONTINUED | OUTPATIENT
Start: 2022-12-08 | End: 2022-12-16

## 2022-12-08 RX ADMIN — Medication 50 MILLIGRAM(S): at 05:28

## 2022-12-08 RX ADMIN — Medication 20 MILLIEQUIVALENT(S): at 10:18

## 2022-12-08 RX ADMIN — Medication 975 MILLIGRAM(S): at 05:58

## 2022-12-08 RX ADMIN — ENOXAPARIN SODIUM 40 MILLIGRAM(S): 100 INJECTION SUBCUTANEOUS at 05:28

## 2022-12-08 RX ADMIN — POLYETHYLENE GLYCOL 3350 17 GRAM(S): 17 POWDER, FOR SOLUTION ORAL at 11:06

## 2022-12-08 RX ADMIN — Medication 975 MILLIGRAM(S): at 13:58

## 2022-12-08 RX ADMIN — SODIUM CHLORIDE 1000 MILLILITER(S): 9 INJECTION, SOLUTION INTRAVENOUS at 03:34

## 2022-12-08 RX ADMIN — LISINOPRIL 20 MILLIGRAM(S): 2.5 TABLET ORAL at 05:28

## 2022-12-08 RX ADMIN — ATORVASTATIN CALCIUM 10 MILLIGRAM(S): 80 TABLET, FILM COATED ORAL at 21:25

## 2022-12-08 RX ADMIN — Medication 20 MILLIEQUIVALENT(S): at 11:06

## 2022-12-08 RX ADMIN — SODIUM CHLORIDE 666.67 MILLILITER(S): 9 INJECTION INTRAMUSCULAR; INTRAVENOUS; SUBCUTANEOUS at 10:59

## 2022-12-08 RX ADMIN — Medication 20 MILLIEQUIVALENT(S): at 13:58

## 2022-12-08 RX ADMIN — Medication 25 MILLIGRAM(S): at 11:05

## 2022-12-08 RX ADMIN — QUETIAPINE FUMARATE 12.5 MILLIGRAM(S): 200 TABLET, FILM COATED ORAL at 21:24

## 2022-12-08 RX ADMIN — Medication 5 MILLIGRAM(S): at 03:04

## 2022-12-08 RX ADMIN — Medication 975 MILLIGRAM(S): at 05:28

## 2022-12-08 RX ADMIN — Medication 20 MILLIEQUIVALENT(S): at 21:25

## 2022-12-08 NOTE — PROVIDER CONTACT NOTE (OTHER) - ASSESSMENT
Bp 84/60, . Pt lying in bed responding to questions. Pt states. "I feel ok." denies chest pain, SOB/dizziness.

## 2022-12-08 NOTE — PROGRESS NOTE ADULT - ASSESSMENT
Impression: Stable       Plan:   Continue present treatment                 Cardiology consult appreciated, Labs and TTE ordered                  Will discuss with medicine MD                 Out of bed, ambulate, weight bearing as tolerated                 Physical therapy follow up                 Continue to monitor    Cruz Mota PA-C  Orthopaedic Surgery  Team pager [phone]  [phone]

## 2022-12-08 NOTE — PROVIDER CONTACT NOTE (OTHER) - ASSESSMENT
Elevated HR ranging from 105-175bpm while lying in bed. Pt confused & agitated, denies chest pain/SOB/dizziness. MD Jacobo made aware.

## 2022-12-08 NOTE — CONSULT NOTE ADULT - ASSESSMENT
#Atrial fibrillation with RVR  Atrial fibrillation most likely in the setting of recent surgery  Patient's CHADSVASc score is ***  -Please start the patient on metoprolol tartrate 25mg BID  -Can give patient metoprolol 5mg IV pushes (push over two minutes), up to three times, seperated by 5 minutes for HR>130  -Please obtain TTE  -Check TSH, HgbA1c, lipid panel   -Start the patient on heparin drip for elevated CHADSVASc, can transition to DOAC when patient is more stable 89 yo female with past medical history of HTN, dementia presenting after fall. In the ED she was found to have a left hip fracture and is s/p an Open reduction and internal fixation of the left hip (POD#5).   Cardiology was consulted for atrial fibrillation with RVR. Patient comfortable and without chest pain, SOB or palpitations.   Metoprolol 5mg IV was pushed with improvement of heart rate.     #Atrial fibrillation with RVR  Atrial fibrillation most likely in the setting of recent surgery and pain.   Patient's CHADSVASc score is 4  -Can increase metoprolol succinate to 75mg daily and can give first dose now  -Can give patient metoprolol 5mg IV pushes (push over two minutes), up to three times, seperated by 5 minutes for HR>130  -Please obtain TTE  -Check TSH, HgbA1c, lipid panel   -Replete potassium as last value 3.3  -Elevated CHADSVASc, patient elderly and with recent fall, would discuss anticoagulation with family and explain risks and beneftis before starting. If agreeable patient can be started on heparin drip and then DOAC subsequently    Sania Yepez MD  Cardiology fellow 89 yo F with past medical history of HTN and dementia, admitted after fall with left hip fracture, now post op day 5 s/p open reduction and internal fixation.  Cardiology was consulted for atrial fibrillation with RVR. Patient comfortable and without chest pain, SOB or palpitations.   Metoprolol 5mg IV was pushed with improvement of heart rate.       REC:  #1.  Atrial fibrillation with RVR - Atrial fibrillation most likely in the setting of recent surgery and pain.  Patient's CHADSVASc score is 4  - Can increase metoprolol succinate to 75mg daily and can give first dose now  - Can give patient metoprolol 5mg IV pushes (push over two minutes), up to three times,  by 5 minutes for HR>130  - Please obtain TTE  - Check TSH, HgbA1c, lipid panel   - Replete potassium to > 4.0, as last value was 3.3  - Elevated CHADSVASc, patient elderly and with recent fall, would discuss anticoagulation with family and explain risks and beneftis before starting. If agreeable patient can be started on heparin drip and then DOAC subsequently.    2.  HTN  - continue lisinopril  - continue metoprolol      Sania Yepez MD  Cardiology fellow    Plan discussed with cardiology fellow.  Patient seen and examined.  Hx., exam and labs as above.  I agree with the assessment and recommendations, which I have reviewed and edited where appropriate.  Austin Macario M.D.  Cardiology Attending, Consult Service    For Cardiology consults and questions, all Cardiology service information can be found 24/7 on [email or web address] - use password: [email or web address] to log in.

## 2022-12-08 NOTE — PROGRESS NOTE ADULT - SUBJECTIVE AND OBJECTIVE BOX
89 yo female presenting after fall. pt states door was partially opened and tripped and fell onto her left side. afterward complained of pain to left leg and hip. Unable to ambulate since. No h/o prior injuries to leg. Denies any head trauma. no n/v. no chest pain or sob. is not on any blood thinners. Did not take anything for pain prior to arrival. Patient was brought to [facility] for further evaluation and treatment. In the ED she was found to have a left hip fracture and is s/p an Open reduction and internal fixation of the left hip. Patient seen now resting comfortably. appears pain is well controlled. Patient remains confused.      MEDICATIONS  (STANDING):  acetaminophen     Tablet .. 975 milliGRAM(s) Oral every 8 hours  atorvastatin 10 milliGRAM(s) Oral at bedtime  enoxaparin Injectable 40 milliGRAM(s) SubCutaneous every 24 hours  influenza  Vaccine (HIGH DOSE) 0.7 milliLiter(s) IntraMuscular once  lisinopril 20 milliGRAM(s) Oral daily  polyethylene glycol 3350 17 Gram(s) Oral daily  potassium chloride    Tablet ER 20 milliEquivalent(s) Oral once  potassium chloride    Tablet ER 20 milliEquivalent(s) Oral every 2 hours  QUEtiapine 12.5 milliGRAM(s) Oral at bedtime  senna 2 Tablet(s) Oral at bedtime  sodium chloride 0.9%. 1000 milliLiter(s) (125 mL/Hr) IV Continuous <Continuous>    MEDICATIONS  (PRN):  bisacodyl Suppository 10 milliGRAM(s) Rectal daily PRN If no bowel movement  magnesium hydroxide Suspension 30 milliLiter(s) Oral daily PRN Constipation  melatonin 3 milliGRAM(s) Oral at bedtime PRN Insomnia  QUEtiapine 12.5 milliGRAM(s) Oral daily PRN agitation          VITALS:   T(C): 36.9 (12-08-22 @ 11:45), Max: 36.9 (12-07-22 @ 20:25)  HR: 70 (12-08-22 @ 11:45) (70 - 150)  BP: 129/72 (12-08-22 @ 11:45) (84/60 - 149/70)  RR: 18 (12-08-22 @ 11:45) (18 - 18)  SpO2: 97% (12-08-22 @ 11:45) (94% - 100%)  Wt(kg): --    PHYSICAL EXAM:  GENERAL: NAD, well-groomed, well-developed  HEAD:  Atraumatic, Normocephalic  EYES: EOMI, PERRLA, conjunctiva and sclera clear  ENMT: No tonsillar erythema, exudates, or enlargement; Moist mucous membranes, Good dentition, No lesions  NECK: Supple, No JVD, Normal thyroid  NERVOUS SYSTEM:  Alert & Oriented X1,  Motor Strength 5/5 B/L upper and lower extremities; DTRs 2+ intact and symmetric  CHEST/LUNG: Clear to percussion bilaterally; No rales, rhonchi, wheezing, or rubs  HEART: Regular rate and rhythm; No murmurs, rubs, or gallops  ABDOMEN: Soft, Nontender, Nondistended; Bowel sounds present  EXTREMITIES:  2+ Peripheral Pulses, No clubbing, cyanosis, or edema  LYMPH: No lymphadenopathy noted  SKIN: No rashes or lesions    LABS:        CBC Full  -  ( 08 Dec 2022 07:30 )  WBC Count : 12.11 K/uL  RBC Count : 2.83 M/uL  Hemoglobin : 8.8 g/dL  Hematocrit : 25.7 %  Platelet Count - Automated : 272 K/uL  Mean Cell Volume : 90.8 fl  Mean Cell Hemoglobin : 31.1 pg  Mean Cell Hemoglobin Concentration : 34.2 gm/dL  Auto Neutrophil # : x  Auto Lymphocyte # : x  Auto Monocyte # : x  Auto Eosinophil # : x  Auto Basophil # : x  Auto Neutrophil % : x  Auto Lymphocyte % : x  Auto Monocyte % : x  Auto Eosinophil % : x  Auto Basophil % : x    12-08    138  |  103  |  15  ----------------------------<  110<H>  3.3<L>   |  23  |  0.59    Ca    9.3      08 Dec 2022 07:30  Mg     1.8     12-08            CAPILLARY BLOOD GLUCOSE          RADIOLOGY & ADDITIONAL TESTS:

## 2022-12-08 NOTE — CHART NOTE - NSCHARTNOTEFT_GEN_A_CORE
Nurse called regarding elevated heart rate into the 140s. EKG ordered. Patient seen and examined at bedside and noted to be comfortable, denies chest pain/n/v/SOB however patient is pleasantly demented. EKG demonstrated abnormal findings of afib with RVR. Cardiology fellow was consulted for evaluation and recs. Patients heart rate subsequently elevated up to the 180s. Patient again seen at bedside and was comfortable w/o chest pain or other complaints. At this time, 5mg IV push of metoprolol was ordered to decrease patients heart rate while pending update/recs from cardiology.

## 2022-12-08 NOTE — PROGRESS NOTE ADULT - TIME BILLING
Discussed treatment plan with staff and patient at bedside.  DC planning to rehab  I am a non participating [employer] physician seeing Pt in coverage for Dr Villagomez. The above patient documentation by Dr. Pritchett was reviewed and I agree with his evaluation, assessment and treatment plan.  Matthew Villagomez M.D.

## 2022-12-08 NOTE — PROGRESS NOTE ADULT - SUBJECTIVE AND OBJECTIVE BOX
ORTHO  Patient is a 89y old  Female who presents with a chief complaint of Displaced intertrochanteric fracture of left femur, initial encounter for closed fracture     (07 Dec 2022 11:51)    Pt. resting without complaint, pleasantly confused  Events of last night noted    VS-  T(C): 36.5 (12-08-22 @ 04:34), Max: 36.9 (12-07-22 @ 20:25)  HR: 109 (12-08-22 @ 04:34) (75 - 150)  BP: 113/66 (12-08-22 @ 04:34) (84/60 - 172/66)  RR: 18 (12-08-22 @ 04:34) (18 - 18)  SpO2: 98% (12-08-22 @ 04:34) (94% - 98%)  Wt(kg): --    M.S.- Alert  Extremity- Left hip prox incision C/D steri' s intact (patient continues to remove dressing not touching steri strips)                             distal Aquacel- dressing C/D/I  Neuro-              Motor- (+) ankle DF/PF              Sensation- grossly intact to light touch              Calves- soft, nontender- PAS                               9.5    11.16 )-----------( 270      ( 07 Dec 2022 09:36 )             28.4     12-07    140  |  105  |  8   ----------------------------<  106<H>  3.3<L>   |  22  |  0.45<L>    Ca    9.0      07 Dec 2022 09:36

## 2022-12-08 NOTE — CONSULT NOTE ADULT - SUBJECTIVE AND OBJECTIVE BOX
Patient seen and evaluated at bedside    Chief Complaint:    HPI:  Orthopedics Consult Note:    89 yo female with past medical history of HTN, dementia presenting after fall. pt states door was partially opened and tripped and fell onto her left side. afterward complained of pain to left leg and hip. Unable to ambulate since. No h/o prior injuries to leg. Denies any head trauma. She denies n/v.  She also denies chest pain or sob. Patient was brought to [facility] for further evaluation and treatment. In the ED she was found to have a left hip fracture and is s/p an Open reduction and internal fixation of the left hip (POD#5). Patient seen now resting comfortably. It appears that her pain is well controlled. Patient remains confused.        Allergies:  No Known Allergies      Vital Signs:  T(C): 36.7 (12-04-22 @ 03:40), Max: 36.7 (12-04-22 @ 00:43)  HR: 61 (12-04-22 @ 03:40) (61 - 84)  BP: 137/73 (12-04-22 @ 03:40) (137/73 - 173/88)  RR: 17 (12-04-22 @ 03:40) (17 - 18)  SpO2: 95% (12-04-22 @ 03:40) (95% - 96%)    Labs:  CBC ( 12-04-22 @ 02:11 )                   11.4  13.40 )-----------( 287                33.9      Chem ( 12-04-22 @ 02:11 )  133  |  96  |  12  ----------------------------<  125  4.0   |  25  |  0.53      PT/INR ( 12-04-22 @ 02:11 )   PT: 12.6;   INR: 1.09      PTT ( 12-04-22 @ 02:11 )  PTT: 27.6      Imaging:  X-rays of the pelvis, left hip and femur demonstrate an intertrochanteric hip fracture.    PE left hip:  LLE is short and ER. +mild swelling, no ecchymosis, no erythema, skin intact, normothermic. +TTP over groin and greater troch. LROM 2' pain. Moving ankle and all toes, +EHL/FHL/TA/GS. SILT throughout. DP and PT pulses 2+.    Secondary Survey:  Left knee, left ankle, RLE, and B/L UEs with no swelling, no ecchymoses, no abrasions, no lacerations or any other signs of injury with full painless baseline ROM and no bony TTP. Able to SLR RLE. No pain with axial loading or log roll RLE. Sensation intact distally, moving all digits. Distal pulses intact.     Spine and ribs with no bony TTP.     Assessment:  89y Female with a left intertrochanteric hip fracture s/p mechanical trip and fall today.    Plan:  Pt and family advised that surgical fixation of right hip fracture with Intramedullary Nail is necessary.  Surgical procedure was discussed in detail with patient and family including all R/B/As; Pt/Pts family expressed understanding and consent for surgery was obtained.  consult Medical service for optimization and medical clearance.  f/u medical clearance.  f/u labs/imaging.  Complete bedrest.  Pain control.  Ice application.  DVT prophylaxis with SCDs for now.  NPO and hold chemical anticoagulation now with plan for possible OR today pending medical optimization and clearance.   IVF while NPO.  Case discussed with Dr. Blair who agrees with plan. (04 Dec 2022 05:25)      PMHx:   Hypertension    Dementia        PSHx:       Allergies:  No Known Allergies      Home Meds:    Current Medications:   acetaminophen     Tablet .. 975 milliGRAM(s) Oral every 8 hours  atorvastatin 10 milliGRAM(s) Oral at bedtime  bisacodyl Suppository 10 milliGRAM(s) Rectal daily PRN  enoxaparin Injectable 40 milliGRAM(s) SubCutaneous every 24 hours  influenza  Vaccine (HIGH DOSE) 0.7 milliLiter(s) IntraMuscular once  lisinopril 20 milliGRAM(s) Oral daily  magnesium hydroxide Suspension 30 milliLiter(s) Oral daily PRN  melatonin 3 milliGRAM(s) Oral at bedtime PRN  metoprolol succinate ER 50 milliGRAM(s) Oral daily  metoprolol tartrate IVPB 5 milliGRAM(s) IV Intermittent once  polyethylene glycol 3350 17 Gram(s) Oral daily  potassium chloride    Tablet ER 20 milliEquivalent(s) Oral once  QUEtiapine 12.5 milliGRAM(s) Oral at bedtime  QUEtiapine 12.5 milliGRAM(s) Oral daily PRN  senna 2 Tablet(s) Oral at bedtime  sodium chloride 0.9%. 1000 milliLiter(s) IV Continuous <Continuous>      FAMILY HISTORY:      Social History:  Smoking History:  Alcohol Use:  Drug Use:    REVIEW OF SYSTEMS:  Constitutional:     [ ] negative [ ] fevers [ ] chills [ ] weight loss [ ] weight gain  HEENT:                  [ ] negative [ ] dry eyes [ ] eye irritation [ ] postnasal drip [ ] nasal congestion  CV:                         [ ] negative  [ ] chest pain [ ] orthopnea [ ] palpitations [ ] murmur  Resp:                     [ ] negative [ ] cough [ ] shortness of breath [ ] dyspnea [ ] wheezing [ ] sputum [ ]hemoptysis  GI:                          [ ] negative [ ] nausea [ ] vomiting [ ] diarrhea [ ] constipation [ ] abd pain [ ] dysphagia   :                        [ ] negative [ ] dysuria [ ] nocturia [ ] hematuria [ ] increased urinary frequency  Musculoskeletal: [ ] negative [ ] back pain [ ] myalgias [ ] arthralgias [ ] fracture  Skin:                       [ ] negative [ ] rash [ ] itch  Neurological:        [ ] negative [ ] headache [ ] dizziness [ ] syncope [ ] weakness [ ] numbness  Psychiatric:           [ ] negative [ ] anxiety [ ] depression  Endocrine:            [ ] negative [ ] diabetes [ ] thyroid problem  Heme/Lymph:      [ ] negative [ ] anemia [ ] bleeding problem  Allergic/Immune: [ ] negative [ ] itchy eyes [ ] nasal discharge [ ] hives [ ] angioedema    [ ] All other systems negative  [ ] Unable to assess ROS due to      Physical Exam:  T(F): 97.8 (12-08), Max: 98.4 (12-07)  HR: 105 (12-08) (72 - 105)  BP: 114/62 (12-08) (114/62 - 172/66)  RR: 18 (12-08)  SpO2: 98% (12-08)  GENERAL: No acute distress, well-developed  HEAD:  Atraumatic, Normocephalic  ENT: EOMI, PERRLA, conjunctiva and sclera clear, Neck supple, No JVD, moist mucosa  CHEST/LUNG: Clear to auscultation bilaterally; No wheeze, equal breath sounds bilaterally   BACK: No spinal tenderness  HEART: Regular rate and rhythm; No murmurs, rubs, or gallops  ABDOMEN: Soft, Nontender, Nondistended; Bowel sounds present  EXTREMITIES:  No clubbing, cyanosis, or edema  PSYCH: Nl behavior, nl affect  NEUROLOGY: AAOx3, non-focal, cranial nerves intact  SKIN: Normal color, No rashes or lesions  LINES:    Cardiovascular Diagnostic Testing:    ECG: Personally reviewed:    Echo: Personally reviewed:    Stress Testing:    Cath:    Imaging:    CXR: Personally reviewed    Labs: Personally reviewed                        9.5    11.16 )-----------( 270      ( 07 Dec 2022 09:36 )             28.4     12-07    140  |  105  |  8   ----------------------------<  106<H>  3.3<L>   |  22  |  0.45<L>    Ca    9.0      07 Dec 2022 09:36                      No Known Allergies    acetaminophen     Tablet .. 975 milliGRAM(s) Oral every 8 hours  atorvastatin 10 milliGRAM(s) Oral at bedtime  bisacodyl Suppository 10 milliGRAM(s) Rectal daily PRN  enoxaparin Injectable 40 milliGRAM(s) SubCutaneous every 24 hours  influenza  Vaccine (HIGH DOSE) 0.7 milliLiter(s) IntraMuscular once  lisinopril 20 milliGRAM(s) Oral daily  magnesium hydroxide Suspension 30 milliLiter(s) Oral daily PRN  melatonin 3 milliGRAM(s) Oral at bedtime PRN  metoprolol succinate ER 50 milliGRAM(s) Oral daily  metoprolol tartrate IVPB 5 milliGRAM(s) IV Intermittent once  polyethylene glycol 3350 17 Gram(s) Oral daily  potassium chloride    Tablet ER 20 milliEquivalent(s) Oral once  QUEtiapine 12.5 milliGRAM(s) Oral at bedtime  QUEtiapine 12.5 milliGRAM(s) Oral daily PRN  senna 2 Tablet(s) Oral at bedtime  sodium chloride 0.9%. 1000 milliLiter(s) IV Continuous <Continuous>    T(F): 97.8 (12-08), Max: 98.4 (12-07)  HR: 105 (12-08) (72 - 105)  BP: 114/62 (12-08) (114/62 - 172/66)  RR: 18 (12-08)  SpO2: 98% (12-08) Patient seen and evaluated at bedside    Chief Complaint:    HPI:  Orthopedics Consult Note:    89 yo female with past medical history of HTN, dementia presenting after fall. pt states door was partially opened and tripped and fell onto her left side. Afterwards, she started complaining of pain to left leg and hip. She has been unable to ambulate since. No h/o prior injuries to leg. Denies any head trauma. She denies n/v.  She also denies chest pain or sob. Patient was brought to [facility] for further evaluation and treatment. In the ED she was found to have a left hip fracture and is s/p an Open reduction and internal fixation of the left hip (POD#5). Patient seen now resting comfortably. It appears that her pain is well controlled. Patient remains confused.  Cardiology was consulted for atrial fibrillation with RVR. Patient comfortable and without chest pain, SOB or palpitations.   Metoprolol 5mg IV was pushed with improvement of heart rate.       PMHx:   Hypertension    Dementia        PSHx:       Allergies:  No Known Allergies      Home Meds:    Current Medications:   acetaminophen     Tablet .. 975 milliGRAM(s) Oral every 8 hours  atorvastatin 10 milliGRAM(s) Oral at bedtime  bisacodyl Suppository 10 milliGRAM(s) Rectal daily PRN  enoxaparin Injectable 40 milliGRAM(s) SubCutaneous every 24 hours  influenza  Vaccine (HIGH DOSE) 0.7 milliLiter(s) IntraMuscular once  lisinopril 20 milliGRAM(s) Oral daily  magnesium hydroxide Suspension 30 milliLiter(s) Oral daily PRN  melatonin 3 milliGRAM(s) Oral at bedtime PRN  metoprolol succinate ER 50 milliGRAM(s) Oral daily  metoprolol tartrate IVPB 5 milliGRAM(s) IV Intermittent once  polyethylene glycol 3350 17 Gram(s) Oral daily  potassium chloride    Tablet ER 20 milliEquivalent(s) Oral once  QUEtiapine 12.5 milliGRAM(s) Oral at bedtime  QUEtiapine 12.5 milliGRAM(s) Oral daily PRN  senna 2 Tablet(s) Oral at bedtime  sodium chloride 0.9%. 1000 milliLiter(s) IV Continuous <Continuous>      FAMILY HISTORY:      Social History:  Unable to obtain    REVIEW OF SYSTEMS:  Constitutional:     [ ] negative [ ] fevers [ ] chills [ ] weight loss [ ] weight gain  HEENT:                  [ ] negative [ ] dry eyes [ ] eye irritation [ ] postnasal drip [ ] nasal congestion  CV:                         [ ] negative  [ ] chest pain [ ] orthopnea [ ] palpitations [ ] murmur  Resp:                     [ ] negative [ ] cough [ ] shortness of breath [ ] dyspnea [ ] wheezing [ ] sputum [ ]hemoptysis  GI:                          [ ] negative [ ] nausea [ ] vomiting [ ] diarrhea [ ] constipation [ ] abd pain [ ] dysphagia   :                        [ ] negative [ ] dysuria [ ] nocturia [ ] hematuria [ ] increased urinary frequency  Musculoskeletal: [ ] negative [ ] back pain [ ] myalgias [ ] arthralgias [ ] fracture  Skin:                       [ ] negative [ ] rash [ ] itch  Neurological:        [ ] negative [ ] headache [ ] dizziness [ ] syncope [ ] weakness [ ] numbness  Psychiatric:           [ ] negative [ ] anxiety [ ] depression  Endocrine:            [ ] negative [ ] diabetes [ ] thyroid problem  Heme/Lymph:      [ ] negative [ ] anemia [ ] bleeding problem  Allergic/Immune: [ ] negative [ ] itchy eyes [ ] nasal discharge [ ] hives [ ] angioedema    [ ] All other systems negative  [ ] Unable to assess ROS due to      Physical Exam:  T(F): 97.8 (12-08), Max: 98.4 (12-07)  HR: 105 (12-08) (72 - 105)  BP: 114/62 (12-08) (114/62 - 172/66)  RR: 18 (12-08)  SpO2: 98% (12-08)  GENERAL: No acute distress, well-developed. She is combative during interview and exam. A&Ox1  HEAD:  Atraumatic, Normocephalic  ENT: EOMI, PERRLA, conjunctiva and sclera clear, Neck supple, No JVD, moist mucosa  CHEST/LUNG: Clear to auscultation bilaterally; No wheeze, equal breath sounds bilaterally   BACK: No spinal tenderness  HEART: Regular rate and rhythm; No murmurs, rubs, or gallops  ABDOMEN: Soft, Nontender, Nondistended; Bowel sounds present  EXTREMITIES:  No clubbing, cyanosis, or edema      Cardiovascular Diagnostic Testing:    ECG: Personally reviewed: Atrial fibrillation with rate of 131. Left axis deviation iwth incomplete right bundle morphology. There are no ST segment elevations but does have St depressions in lateral leads.     Echo: Personally reviewed: none    Imaging:    CXR: Personally reviewed  IMPRESSION:  Clear lungs.    No acute fracture. If clinical suspicion for rib fractures persists, recommend obtaining dedicated rib x-ray series versus CT of the chest for improved diagnostic sensitivity.      Labs: Personally reviewed                        9.5    11.16 )-----------( 270      ( 07 Dec 2022 09:36 )             28.4     12-07    140  |  105  |  8   ----------------------------<  106<H>  3.3<L>   |  22  |  0.45<L>    Ca    9.0      07 Dec 2022 09:36                      No Known Allergies    acetaminophen     Tablet .. 975 milliGRAM(s) Oral every 8 hours  atorvastatin 10 milliGRAM(s) Oral at bedtime  bisacodyl Suppository 10 milliGRAM(s) Rectal daily PRN  enoxaparin Injectable 40 milliGRAM(s) SubCutaneous every 24 hours  influenza  Vaccine (HIGH DOSE) 0.7 milliLiter(s) IntraMuscular once  lisinopril 20 milliGRAM(s) Oral daily  magnesium hydroxide Suspension 30 milliLiter(s) Oral daily PRN  melatonin 3 milliGRAM(s) Oral at bedtime PRN  metoprolol succinate ER 50 milliGRAM(s) Oral daily  metoprolol tartrate IVPB 5 milliGRAM(s) IV Intermittent once  polyethylene glycol 3350 17 Gram(s) Oral daily  potassium chloride    Tablet ER 20 milliEquivalent(s) Oral once  QUEtiapine 12.5 milliGRAM(s) Oral at bedtime  QUEtiapine 12.5 milliGRAM(s) Oral daily PRN  senna 2 Tablet(s) Oral at bedtime  sodium chloride 0.9%. 1000 milliLiter(s) IV Continuous <Continuous>    T(F): 97.8 (12-08), Max: 98.4 (12-07)  HR: 105 (12-08) (72 - 105)  BP: 114/62 (12-08) (114/62 - 172/66)  RR: 18 (12-08)  SpO2: 98% (12-08) 89 yo female with past medical history of HTN and dementia, presenting after fall.  Pt. states door was partially opened and she tripped and fell onto her left side. Afterwards, she developed pain of the left leg and hip and was unable to ambulate since.  She denies any head trauma. She denies chest pain or sob.   Patient was brought to [facility].  In the ED, she was found to have a left hip fracture.  She is now day 5 s/p open reduction and internal fixation of the left hip.  Cardiology consulted for atrial fibrillation with RVR. Patient comfortable and without chest pain, SOB or palpitations.   Metoprolol 5mg IV was pushed with improvement of heart rate.       PMHx:   Hypertension  Dementia    Allergies:  No Known Allergies    Current Medications:   acetaminophen     Tablet .. 975 milliGRAM(s) Oral every 8 hours  atorvastatin 10 milliGRAM(s) Oral at bedtime  bisacodyl Suppository 10 milliGRAM(s) Rectal daily PRN  enoxaparin Injectable 40 milliGRAM(s) SubCutaneous every 24 hours  influenza  Vaccine (HIGH DOSE) 0.7 milliLiter(s) IntraMuscular once  lisinopril 20 milliGRAM(s) Oral daily  magnesium hydroxide Suspension 30 milliLiter(s) Oral daily PRN  melatonin 3 milliGRAM(s) Oral at bedtime PRN  metoprolol succinate ER 50 milliGRAM(s) Oral daily  metoprolol tartrate IVPB 5 milliGRAM(s) IV Intermittent once  polyethylene glycol 3350 17 Gram(s) Oral daily  potassium chloride    Tablet ER 20 milliEquivalent(s) Oral once  QUEtiapine 12.5 milliGRAM(s) Oral at bedtime  QUEtiapine 12.5 milliGRAM(s) Oral daily PRN  senna 2 Tablet(s) Oral at bedtime  sodium chloride 0.9%. 1000 milliLiter(s) IV Continuous <Continuous>    FAMILY HISTORY:  Unable to obtain    Social History:  Unable to obtain    REVIEW OF SYSTEMS:  Constitutional:     [x ] negative [ ] fevers [ ] chills [ ] weight loss [ ] weight gain  HEENT:                  [ x] negative [ ] dry eyes [ ] eye irritation [ ] postnasal drip [ ] nasal congestion  CV:                         [x ] negative  [ ] chest pain [ ] orthopnea [ ] palpitations [ ] murmur  Resp:                     [ x] negative [ ] cough [ ] shortness of breath [ ] dyspnea [ ] wheezing [ ] sputum [ ]hemoptysis  GI:                          [x ] negative [ ] nausea [ ] vomiting [ ] diarrhea [ ] constipation [ ] abd pain [ ] dysphagia   :                        [ x] negative [ ] dysuria [ ] nocturia [ ] hematuria [ ] increased urinary frequency  Skin:                       [x ] negative [ ] rash [ ] itch  Endocrine:            [x ] negative [ ] diabetes [ ] thyroid problem  Heme/Lymph:      [x ] negative [ ] anemia [ ] bleeding problem  Allergic/Immune: [x] negative [ ] itchy eyes [ ] nasal discharge [ ] hives [ ] angioedema  [x ] All other systems negative      Physical Exam:  T(F): 97.8 (12-08), Max: 98.4 (12-07)  HR: 105 (12-08) (72 - 105)  BP: 114/62 (12-08) (114/62 - 172/66)  RR: 18 (12-08)  SpO2: 98% (12-08)    GENERAL: No acute distress, well-developed. She is combative during interview and exam. A&Ox1  HEAD:  Atraumatic, Normocephalic  ENT: EOMI, PERRLA, conjunctiva and sclera clear, Neck supple, No JVD, moist mucosa  CHEST/LUNG: Clear to auscultation bilaterally; No wheeze, equal breath sounds bilaterally   BACK: No spinal tenderness  HEART: Regular rate and rhythm; No murmurs, rubs, or gallops  ABDOMEN: Soft, Nontender, Nondistended; Bowel sounds present  EXTREMITIES:  No clubbing, cyanosis, or edema      ECG: Atrial fibrillation with ventricular rate of 131. Left axis deviation with incomplete right bundle morphology; there are no ST segment elevations but does have ST depressions in lateral leads.       CXR:   IMPRESSION:  Clear lungs.  No acute fracture. If clinical suspicion for rib fractures persists, recommend obtaining dedicated rib x-ray series versus CT of the chest for improved diagnostic sensitivity.    Labs:                       9.5    11.16 )-----------( 270      ( 07 Dec 2022 09:36 )             28.4     12-07  140  |  105  |  8   ----------------------------<  106<H>  3.3<L>   |  22  |  0.45<L>    Ca    9.0      07 Dec 2022 09:36

## 2022-12-09 LAB
ANION GAP SERPL CALC-SCNC: 11 MMOL/L — SIGNIFICANT CHANGE UP (ref 5–17)
BUN SERPL-MCNC: 14 MG/DL — SIGNIFICANT CHANGE UP (ref 7–23)
CALCIUM SERPL-MCNC: 9.2 MG/DL — SIGNIFICANT CHANGE UP (ref 8.4–10.5)
CHLORIDE SERPL-SCNC: 105 MMOL/L — SIGNIFICANT CHANGE UP (ref 96–108)
CO2 SERPL-SCNC: 22 MMOL/L — SIGNIFICANT CHANGE UP (ref 22–31)
CREAT SERPL-MCNC: 0.53 MG/DL — SIGNIFICANT CHANGE UP (ref 0.5–1.3)
EGFR: 86 ML/MIN/1.73M2 — SIGNIFICANT CHANGE UP
GLUCOSE SERPL-MCNC: 98 MG/DL — SIGNIFICANT CHANGE UP (ref 70–99)
HCT VFR BLD CALC: 30.3 % — LOW (ref 34.5–45)
HGB BLD-MCNC: 10 G/DL — LOW (ref 11.5–15.5)
MCHC RBC-ENTMCNC: 30.2 PG — SIGNIFICANT CHANGE UP (ref 27–34)
MCHC RBC-ENTMCNC: 33 GM/DL — SIGNIFICANT CHANGE UP (ref 32–36)
MCV RBC AUTO: 91.5 FL — SIGNIFICANT CHANGE UP (ref 80–100)
MRSA PCR RESULT.: SIGNIFICANT CHANGE UP
NRBC # BLD: 0 /100 WBCS — SIGNIFICANT CHANGE UP (ref 0–0)
PLATELET # BLD AUTO: 284 K/UL — SIGNIFICANT CHANGE UP (ref 150–400)
POTASSIUM SERPL-MCNC: 4.1 MMOL/L — SIGNIFICANT CHANGE UP (ref 3.5–5.3)
POTASSIUM SERPL-SCNC: 4.1 MMOL/L — SIGNIFICANT CHANGE UP (ref 3.5–5.3)
RBC # BLD: 3.31 M/UL — LOW (ref 3.8–5.2)
RBC # FLD: 14.5 % — SIGNIFICANT CHANGE UP (ref 10.3–14.5)
S AUREUS DNA NOSE QL NAA+PROBE: SIGNIFICANT CHANGE UP
SODIUM SERPL-SCNC: 138 MMOL/L — SIGNIFICANT CHANGE UP (ref 135–145)
WBC # BLD: 12.11 K/UL — HIGH (ref 3.8–10.5)
WBC # FLD AUTO: 12.11 K/UL — HIGH (ref 3.8–10.5)

## 2022-12-09 PROCEDURE — 99233 SBSQ HOSP IP/OBS HIGH 50: CPT | Mod: GC

## 2022-12-09 RX ORDER — METOPROLOL TARTRATE 50 MG
100 TABLET ORAL DAILY
Refills: 0 | Status: DISCONTINUED | OUTPATIENT
Start: 2022-12-09 | End: 2022-12-16

## 2022-12-09 RX ADMIN — Medication 975 MILLIGRAM(S): at 05:50

## 2022-12-09 RX ADMIN — Medication 975 MILLIGRAM(S): at 17:21

## 2022-12-09 RX ADMIN — Medication 975 MILLIGRAM(S): at 22:30

## 2022-12-09 RX ADMIN — QUETIAPINE FUMARATE 12.5 MILLIGRAM(S): 200 TABLET, FILM COATED ORAL at 23:30

## 2022-12-09 RX ADMIN — Medication 975 MILLIGRAM(S): at 22:05

## 2022-12-09 RX ADMIN — Medication 75 MILLIGRAM(S): at 05:21

## 2022-12-09 RX ADMIN — Medication 975 MILLIGRAM(S): at 05:20

## 2022-12-09 RX ADMIN — Medication 100 MILLIGRAM(S): at 18:15

## 2022-12-09 RX ADMIN — SENNA PLUS 2 TABLET(S): 8.6 TABLET ORAL at 22:01

## 2022-12-09 RX ADMIN — Medication 975 MILLIGRAM(S): at 14:09

## 2022-12-09 RX ADMIN — LISINOPRIL 20 MILLIGRAM(S): 2.5 TABLET ORAL at 05:21

## 2022-12-09 RX ADMIN — CHLORHEXIDINE GLUCONATE 1 APPLICATION(S): 213 SOLUTION TOPICAL at 05:21

## 2022-12-09 RX ADMIN — ATORVASTATIN CALCIUM 10 MILLIGRAM(S): 80 TABLET, FILM COATED ORAL at 22:02

## 2022-12-09 RX ADMIN — APIXABAN 2.5 MILLIGRAM(S): 2.5 TABLET, FILM COATED ORAL at 17:20

## 2022-12-09 RX ADMIN — APIXABAN 2.5 MILLIGRAM(S): 2.5 TABLET, FILM COATED ORAL at 05:20

## 2022-12-09 NOTE — CHART NOTE - NSCHARTNOTEFT_GEN_A_CORE
Nutrition Follow Up Note  Patient seen for: malnutrition initial follow-up. Chart reviewed, events noted.    '89 YO woman with a hx of HTN, HLD, Dementia, macular degeneration, Presents after a fall at home with a left hip fracture. Patient is s/p an Open reduction and internal fixation of the left hip"    Source: [] Patient       [x] Medical Record        [] RN        [x] Family at bedside       [x] Other: PCA    -If unable to interview patient: [] Trach/Vent/BiPAP  [x] Disoriented/confused/inappropriate to interview    Diet Order:   Diet, Regular:   Supplement Feeding Modality:  Oral  Ensure Enlive Cans or Servings Per Day:  2       Frequency:  Daily (12-07-22)    - Is current order appropriate/adequate? [x] Yes  []  No:     - PO intake :   [] >75%  Adequate    [] 50-75%  Fair       [x] <50%  Poor    - Nutrition-related concerns:      - Per pt's family at bedside and PCA, pt with poor PO intake, just having a few bites of food at meal times. Pt's daughters aware of meal ordering procedures and have menu, and have been ordering preferred items for pt. Additional food preferences obtained and to be honored by RD. Pt's daughters also state pt tolerating current textures.      - Per chart: pt is ordered for atorvastatin in-house.     GI: Daughters deny pt with any nausea, vomiting, constipation, diarrhea.  Last BM 12/8 per chart.   Bowel Regimen? [x] Yes (dulcolax, miralax, senna, magnesium hydroxide)  [] No      Weights:   Dosing wt: 40.8 kg (12-04)  No new weights available per chart. RD to continue to monitor weight trends as able/available.     MEDICATIONS  (STANDING):  acetaminophen     Tablet .. 975 milliGRAM(s) Oral every 8 hours  apixaban 2.5 milliGRAM(s) Oral every 12 hours  atorvastatin 10 milliGRAM(s) Oral at bedtime  chlorhexidine 2% Cloths 1 Application(s) Topical <User Schedule>  influenza  Vaccine (HIGH DOSE) 0.7 milliLiter(s) IntraMuscular once  lisinopril 20 milliGRAM(s) Oral daily  metoprolol succinate ER 75 milliGRAM(s) Oral daily  polyethylene glycol 3350 17 Gram(s) Oral daily  QUEtiapine 12.5 milliGRAM(s) Oral at bedtime  senna 2 Tablet(s) Oral at bedtime  sodium chloride 0.9%. 1000 milliLiter(s) (125 mL/Hr) IV Continuous <Continuous>    MEDICATIONS  (PRN):  bisacodyl Suppository 10 milliGRAM(s) Rectal daily PRN If no bowel movement  magnesium hydroxide Suspension 30 milliLiter(s) Oral daily PRN Constipation  melatonin 3 milliGRAM(s) Oral at bedtime PRN Insomnia  QUEtiapine 12.5 milliGRAM(s) Oral daily PRN agitation      Pertinent Labs:   12-09 @ 09:37: Na 138, BUN 14, Cr 0.53, BG 98, K+ 4.1    A1C with Estimated Average Glucose Result: 5.6 % (12-08-22 @ 04:21)    Skin per nursing documentation: No pressure injuries noted. Surgical incision L greater trochanter (hip)  Edema per nursing documentation: +1 L hip    Estimated Needs:   30-35 kcal/kg = 9093-2408 kcal/day  1.2-1.4 g/kg = 49-57 g pro/day  fluids per team  based on dosing wt: 40.8 kg (12-04)    Previous Nutrition Diagnosis: Severe chronic malnutrition; Underweight  Nutrition Diagnosis is: [x] ongoing  [] resolved [] not applicable   -Being addressed with PO diet, oral nutrition supplements, micronutrient supplementation, and food preferences.     Nutrition Care Plan:  [x] In Progress  [] Achieved  [] Not applicable    New Nutrition Diagnosis: [x] Not applicable    Nutrition Interventions:     Education Provided   [x] Yes: Discussed importance of adequate protein-energy consumption to meet estimated nutrient needs. Encouraged intake of meals and oral nutrition supplements as tolerated. Encouraged intake of protein-rich foods first at mealtimes to help preserve lean muscle mass. Reviewed food choices that are high in protein. Pt's daughters noted with good comprehension and made aware RD remains available for further questions/concerns.     Recommendations:      1) Continue Regular diet.   2) Recommend a multivitamin, pending no medical contraindications, to aid in the prevention of micronutrient deficiencies.  3) Continue Ensure Plus High Protein 2x/day.   4) Monitor PO intake, GI tolerance, skin integrity, labs, weight, and bowel movement regularity.   5) Honor food preferences as feasible. Assist with meals PRN and encourage PO intake.  6) malnutrition alert in chart     Monitoring and Evaluation:   Continue to monitor nutritional intake, tolerance to diet prescription, weights, labs, skin integrity    RD remains available upon request and will follow up per protocol  Lakesha Sanabria, FENGN, CDN Pager #[phone]

## 2022-12-09 NOTE — PROGRESS NOTE ADULT - TIME BILLING
Discussed treatment plan with daughter and patient at bedside.  DC planning to rehab  I am a non participating [employer] physician seeing Pt in coverage for Dr Villagomez. The above patient documentation by Dr. Pritchett was reviewed and I agree with his evaluation, assessment and treatment plan.  Matthew Villagomez M.D.

## 2022-12-09 NOTE — PROGRESS NOTE ADULT - SUBJECTIVE AND OBJECTIVE BOX
Pt seen/examined. Doing well. Pain controlled. No acute overnight complaints or events.    T(C): 36.4 (12-08-22 @ 21:12), Max: 36.9 (12-08-22 @ 11:45)  HR: 73 (12-08-22 @ 21:12) (70 - 150)  BP: 160/80 (12-08-22 @ 21:12) (84/60 - 167/79)  RR: 18 (12-08-22 @ 21:12) (18 - 18)  SpO2: 96% (12-08-22 @ 21:12) (94% - 100%)  Wt(kg): --  - Gen: NAD    Extremity- Left hip prox incision C/D steri' s intact (patient continues to remove dressing not touching steri strips)                             distal Aquacel- dressing C/D/I  Neuro-              Motor- (+) ankle DF/PF              Sensation- grossly intact to light touch              Calves- soft, nontender- PAS    89yFemale s/p L IMN POD5     Impression: Stable       Plan:   Continue present treatment                 Melanie 2.5 BID                  Cardiology recs appreciated                  Will discuss with medicine                 Out of bed, ambulate, weight bearing as tolerated                 Physical therapy follow up                 Continue to monitor

## 2022-12-09 NOTE — PROGRESS NOTE ADULT - SUBJECTIVE AND OBJECTIVE BOX
Patient seen and examined at bedside.    Overnight Events:   No events. Denies CP, palpitations, SOB.     REVIEW OF SYSTEMS:  Constitutional:     [x ] negative [ ] fevers [ ] chills [ ] weight loss [ ] weight gain  HEENT:                  [x ] negative [ ] dry eyes [ ] eye irritation [ ] postnasal drip [ ] nasal congestion  CV:                         [ x] negative  [ ] chest pain [ ] orthopnea [ ] palpitations [ ] murmur  Resp:                     [x ] negative [ ] cough [ ] shortness of breath [ ] dyspnea [ ] wheezing [ ] sputum [ ]hemoptysis  GI:                          [x ] negative [ ] nausea [ ] vomiting [ ] diarrhea [ ] constipation [ ] abd pain [ ] dysphagia   :                        [ x] negative [ ] dysuria [ ] nocturia [ ] hematuria [ ] increased urinary frequency  Musculoskeletal: [x ] negative [ ] back pain [ ] myalgias [ ] arthralgias [ ] fracture  Skin:                       [ x] negative [ ] rash [ ] itch  Neurological:        [ x] negative [ ] headache [ ] dizziness [ ] syncope [ ] weakness [ ] numbness  Psychiatric:           [ x] negative [ ] anxiety [ ] depression  Endocrine:            [ x] negative [ ] diabetes [ ] thyroid problem  Heme/Lymph:      [ x] negative [ ] anemia [ ] bleeding problem  Allergic/Immune: [ x] negative [ ] itchy eyes [ ] nasal discharge [ ] hives [ ] angioedema    [ x] All other systems negative          Current Meds:  acetaminophen     Tablet .. 975 milliGRAM(s) Oral every 8 hours  apixaban 2.5 milliGRAM(s) Oral every 12 hours  atorvastatin 10 milliGRAM(s) Oral at bedtime  bisacodyl Suppository 10 milliGRAM(s) Rectal daily PRN  chlorhexidine 2% Cloths 1 Application(s) Topical <User Schedule>  influenza  Vaccine (HIGH DOSE) 0.7 milliLiter(s) IntraMuscular once  lisinopril 20 milliGRAM(s) Oral daily  magnesium hydroxide Suspension 30 milliLiter(s) Oral daily PRN  melatonin 3 milliGRAM(s) Oral at bedtime PRN  metoprolol succinate ER 75 milliGRAM(s) Oral daily  polyethylene glycol 3350 17 Gram(s) Oral daily  QUEtiapine 12.5 milliGRAM(s) Oral at bedtime  QUEtiapine 12.5 milliGRAM(s) Oral daily PRN  senna 2 Tablet(s) Oral at bedtime  sodium chloride 0.9%. 1000 milliLiter(s) IV Continuous <Continuous>      Vitals:  T(F): 98 (12-09), Max: 98.5 (12-08)  HR: 77 (12-09) (70 - 111)  BP: 174/77 (12-09) (87/53 - 174/77)  RR: 18 (12-09)  SpO2: 97% (12-09)  I&O's Summary    08 Dec 2022 07:01  -  09 Dec 2022 07:00  --------------------------------------------------------  IN: 100 mL / OUT: 1650 mL / NET: -1550 mL      PHYSICAL EXAM:  GENERAL: No acute distress, well-developed. She is combative during interview and exam. A&Ox1  HEAD:  Atraumatic, Normocephalic  ENT: EOMI, PERRLA, conjunctiva and sclera clear, Neck supple, No JVD, moist mucosa  CHEST/LUNG: Clear to auscultation bilaterally; No wheeze, equal breath sounds bilaterally   BACK: No spinal tenderness  HEART: Regular rate and rhythm; No murmurs, rubs, or gallops  ABDOMEN: Soft, Nontender, Nondistended; Bowel sounds present  EXTREMITIES:  No clubbing, cyanosis, or edema                          10.0   12.11 )-----------( 284      ( 09 Dec 2022 09:37 )             30.3     12-09    138  |  105  |  14  ----------------------------<  98  4.1   |  22  |  0.53    Ca    9.2      09 Dec 2022 09:37  Mg     1.8     12-08        DIAGNOSTIC/IMAGING:  Conclusions:  1. Mitral annular calcification, otherwise normal mitral  valve. Minimal mitral regurgitation.  2. Calcified trileaflet aortic valve with normal opening.  Mild aortic regurgitation.  3. Severely dilated left atrium.  LA volume index = 50  cc/m2.Normal left ventricular internal dimensions and wall  thicknesses.Normal left ventricular systolic function. No  segmental wall motion abnormalities. Visually, LVEF is  55-60%. Moderate diastolic dysfunction (Stage II).  4. Normal right atrium. Normal right ventricular size and  function.  5. Normal tricuspid valve. Minimal tricuspid regurgitation.  Estimated pulmonary artery systolic pressure equals 42  mm  Hg, assuming right atrial pressure equals 15 mm Hg,  consistent with mild pulmonary pressures.  6. No pericardial effusion seen.  *** No previous Echo exam.  ------------------------------------------------------------------------  Confirmed on  12/8/2022 - 18:53:10 by Reyna Donnelly M.D.     Patient seen and examined at bedside.    Overnight Events:   No events. Denies CP, palpitations, SOB.     REVIEW OF SYSTEMS:  Constitutional:     [x ] negative [ ] fevers [ ] chills [ ] weight loss [ ] weight gain  HEENT:                  [x ] negative [ ] dry eyes [ ] eye irritation [ ] postnasal drip [ ] nasal congestion  CV:                         [ x] negative  [ ] chest pain [ ] orthopnea [ ] palpitations [ ] murmur  Resp:                     [x ] negative [ ] cough [ ] shortness of breath [ ] dyspnea [ ] wheezing [ ] sputum [ ]hemoptysis  GI:                          [x ] negative [ ] nausea [ ] vomiting [ ] diarrhea [ ] constipation [ ] abd pain [ ] dysphagia   :                        [ x] negative [ ] dysuria [ ] nocturia [ ] hematuria [ ] increased urinary frequency  Musculoskeletal: [x ] negative [ ] back pain [ ] myalgias [ ] arthralgias [ ] fracture  Skin:                       [ x] negative [ ] rash [ ] itch  Neurological:        [ x] negative [ ] headache [ ] dizziness [ ] syncope [ ] weakness [ ] numbness  Psychiatric:           [ x] negative [ ] anxiety [ ] depression  Endocrine:            [ x] negative [ ] diabetes [ ] thyroid problem  Heme/Lymph:      [ x] negative [ ] anemia [ ] bleeding problem  Allergic/Immune: [ x] negative [ ] itchy eyes [ ] nasal discharge [ ] hives [ ] angioedema  [ x] All other systems negative          Current Meds:  acetaminophen     Tablet .. 975 milliGRAM(s) Oral every 8 hours  apixaban 2.5 milliGRAM(s) Oral every 12 hours  atorvastatin 10 milliGRAM(s) Oral at bedtime  bisacodyl Suppository 10 milliGRAM(s) Rectal daily PRN  chlorhexidine 2% Cloths 1 Application(s) Topical <User Schedule>  influenza  Vaccine (HIGH DOSE) 0.7 milliLiter(s) IntraMuscular once  lisinopril 20 milliGRAM(s) Oral daily  magnesium hydroxide Suspension 30 milliLiter(s) Oral daily PRN  melatonin 3 milliGRAM(s) Oral at bedtime PRN  metoprolol succinate ER 75 milliGRAM(s) Oral daily  polyethylene glycol 3350 17 Gram(s) Oral daily  QUEtiapine 12.5 milliGRAM(s) Oral at bedtime  QUEtiapine 12.5 milliGRAM(s) Oral daily PRN  senna 2 Tablet(s) Oral at bedtime  sodium chloride 0.9%. 1000 milliLiter(s) IV Continuous <Continuous>      Vitals:  T(F): 98 (12-09), Max: 98.5 (12-08)  HR: 77 (12-09) (70 - 111)  BP: 174/77 (12-09) (87/53 - 174/77)  RR: 18 (12-09)  SpO2: 97% (12-09)    PHYSICAL EXAM:  GENERAL: No acute distress, well-developed. She is combative during interview and exam. A&Ox1  HEAD:  Atraumatic, Normocephalic  ENT: EOMI, PERRLA, conjunctiva and sclera clear, Neck supple, No JVD, moist mucosa  CHEST/LUNG: Clear to auscultation bilaterally; No wheeze, equal breath sounds bilaterally   BACK: No spinal tenderness  HEART: Regular rate and rhythm; No murmurs, rubs, or gallops  ABDOMEN: Soft, Nontender, Nondistended; Bowel sounds present  EXTREMITIES:  No clubbing, cyanosis, or edema      I&O's Summary  08 Dec 2022 07:01  -  09 Dec 2022 07:00  --------------------------------------------------------  IN: 100 mL / OUT: 1650 mL / NET: -1550 mL      LABS:                      10.0   12.11 )-----------( 284      ( 09 Dec 2022 09:37 )             30.3     12-09  138  |  105  |  14  ----------------------------<  98  4.1   |  22  |  0.53    Ca    9.2      09 Dec 2022 09:37  Mg     1.8     12-08      ECHO  12/8/22:  Conclusions:  1. Mitral annular calcification, otherwise normal mitral valve. Minimal mitral regurgitation.  2. Calcified trileaflet aortic valve with normal opening. Mild aortic regurgitation.  3. Severely dilated left atrium.  LA volume index = 50 cc/m2.  4. Normal left ventricular internal dimensions and wall thicknesses.Normal left ventricular systolic function. No  segmental wall motion abnormalities. Visually, LVEF is 55-60%. Moderate diastolic dysfunction (Stage II).  5. Normal right atrium. Normal right ventricular size and function.  6. Normal tricuspid valve. Minimal tricuspid regurgitation.  7. Estimated pulmonary artery systolic pressure equals 42  mm Hg, assuming right atrial pressure equals 15 mm Hg,   consistent with mild pulmonary pressures.  8. No pericardial effusion seen.  *** No previous Echo exam.  ------------------------------------------------------------------------  Confirmed on  12/8/2022 - 18:53:10 by Reyna Donnelly M.D.

## 2022-12-09 NOTE — PROGRESS NOTE ADULT - ASSESSMENT
89F with past medical history of HTN and dementia, admitted after fall with left hip fracture, now post op day 5 s/p open reduction and internal fixation.  Cardiology was consulted for atrial fibrillation with RVR. Patient comfortable and without chest pain, SOB or palpitations.        REC:  #1.  Atrial fibrillation with RVR - Atrial fibrillation most likely in the setting of recent surgery and pain.  Patient's CHADSVASc score is 4  - Continue metoprolol succinate to 75mg daily   - Can give patient metoprolol 5mg IV pushes (push over two minutes), up to three times,  by 5 minutes for HR>130  - Continue adjusted apixaban dose for AC  - Resume telemetry     2.  HTN  - continue lisinopril  - continue metoprolol       89 F with past medical history of HTN and dementia, admitted after fall with left hip fracture, now post op day 5 s/p open reduction and internal fixation.  Cardiology was consulted for atrial fibrillation with RVR.   Echo shows moderate diastolic LV dysfunction with preserved LV EF; Severe LAE noted.  Patient comfortable at present, without chest pain, SOB or palpitations.        REC:  #1.  Atrial fibrillation with RVR - Atrial fibrillation most likely in the setting of recent surgery and pain, in patient with moderate diastolic LV dysfunction and severe LAE.  Patient's CHADSVASc score is 4  - Continue metoprolol succinate to 75mg daily   - Can give patient metoprolol 5mg IV pushes (push over two minutes), up to three times,  by 5 minutes for HR>130  - Continue adjusted apixaban dose for AC  - Resume telemetry     2.  HTN  - continue lisinopril  - continue metoprolol      Jason White M.D.  Cardiology fellow     Plan discussed with cardiology fellow.  Patient seen and examined.  Hx., exam and labs as above.  I agree with the assessment and recommendations, which I have reviewed and edited where appropriate.  Austin Macario M.D.  Cardiology Attending, Consult Service    For Cardiology consults and questions, all Cardiology service information can be found 24/7 on [email or web address] - use password: [email or web address] to log in.

## 2022-12-10 LAB
GLUCOSE BLDC GLUCOMTR-MCNC: 116 MG/DL — HIGH (ref 70–99)
SARS-COV-2 RNA SPEC QL NAA+PROBE: SIGNIFICANT CHANGE UP

## 2022-12-10 PROCEDURE — 73552 X-RAY EXAM OF FEMUR 2/>: CPT | Mod: 26,LT

## 2022-12-10 PROCEDURE — 72131 CT LUMBAR SPINE W/O DYE: CPT | Mod: 26

## 2022-12-10 PROCEDURE — 72128 CT CHEST SPINE W/O DYE: CPT | Mod: 26

## 2022-12-10 PROCEDURE — 70450 CT HEAD/BRAIN W/O DYE: CPT | Mod: 26

## 2022-12-10 PROCEDURE — 72125 CT NECK SPINE W/O DYE: CPT | Mod: 26

## 2022-12-10 PROCEDURE — 73522 X-RAY EXAM HIPS BI 3-4 VIEWS: CPT | Mod: 26

## 2022-12-10 RX ORDER — HALOPERIDOL DECANOATE 100 MG/ML
2 INJECTION INTRAMUSCULAR ONCE
Refills: 0 | Status: COMPLETED | OUTPATIENT
Start: 2022-12-10 | End: 2022-12-10

## 2022-12-10 RX ADMIN — SENNA PLUS 2 TABLET(S): 8.6 TABLET ORAL at 22:02

## 2022-12-10 RX ADMIN — POLYETHYLENE GLYCOL 3350 17 GRAM(S): 17 POWDER, FOR SOLUTION ORAL at 13:26

## 2022-12-10 RX ADMIN — CHLORHEXIDINE GLUCONATE 1 APPLICATION(S): 213 SOLUTION TOPICAL at 05:39

## 2022-12-10 RX ADMIN — QUETIAPINE FUMARATE 12.5 MILLIGRAM(S): 200 TABLET, FILM COATED ORAL at 22:12

## 2022-12-10 RX ADMIN — HALOPERIDOL DECANOATE 2 MILLIGRAM(S): 100 INJECTION INTRAMUSCULAR at 02:47

## 2022-12-10 RX ADMIN — Medication 975 MILLIGRAM(S): at 08:19

## 2022-12-10 RX ADMIN — QUETIAPINE FUMARATE 12.5 MILLIGRAM(S): 200 TABLET, FILM COATED ORAL at 01:43

## 2022-12-10 RX ADMIN — ATORVASTATIN CALCIUM 10 MILLIGRAM(S): 80 TABLET, FILM COATED ORAL at 22:03

## 2022-12-10 RX ADMIN — LISINOPRIL 20 MILLIGRAM(S): 2.5 TABLET ORAL at 05:40

## 2022-12-10 RX ADMIN — Medication 100 MILLIGRAM(S): at 08:00

## 2022-12-10 RX ADMIN — Medication 975 MILLIGRAM(S): at 22:02

## 2022-12-10 RX ADMIN — Medication 975 MILLIGRAM(S): at 13:25

## 2022-12-10 RX ADMIN — APIXABAN 2.5 MILLIGRAM(S): 2.5 TABLET, FILM COATED ORAL at 17:36

## 2022-12-10 RX ADMIN — Medication 975 MILLIGRAM(S): at 05:40

## 2022-12-10 RX ADMIN — Medication 975 MILLIGRAM(S): at 13:55

## 2022-12-10 RX ADMIN — Medication 975 MILLIGRAM(S): at 22:30

## 2022-12-10 RX ADMIN — APIXABAN 2.5 MILLIGRAM(S): 2.5 TABLET, FILM COATED ORAL at 05:41

## 2022-12-10 NOTE — PROGRESS NOTE ADULT - SUBJECTIVE AND OBJECTIVE BOX
89 yo female presenting after fall. pt states door was partially opened and tripped and fell onto her left side. afterward complained of pain to left leg and hip. Unable to ambulate since. No h/o prior injuries to leg. Denies any head trauma. no n/v. no chest pain or sob. is not on any blood thinners. Did not take anything for pain prior to arrival. Patient was brought to [facility] for further evaluation and treatment. In the ED she was found to have a left hip fracture and is s/p an Open reduction and internal fixation of the left hip. Patient seen now resting comfortably. appears pain is well controlled. Patient remains confused. Patient found to be in new onset Afib Has been going back and forth between Afib and NSR. Patient was found OOB on the floor this am. RRT called and pat was put back in bed. No new injuries seen. Patient remains in alternating afib and NSR      MEDICATIONS  (STANDING):  acetaminophen     Tablet .. 975 milliGRAM(s) Oral every 8 hours  apixaban 2.5 milliGRAM(s) Oral every 12 hours  atorvastatin 10 milliGRAM(s) Oral at bedtime  chlorhexidine 2% Cloths 1 Application(s) Topical <User Schedule>  influenza  Vaccine (HIGH DOSE) 0.7 milliLiter(s) IntraMuscular once  lisinopril 20 milliGRAM(s) Oral daily  metoprolol succinate  milliGRAM(s) Oral daily  polyethylene glycol 3350 17 Gram(s) Oral daily  QUEtiapine 12.5 milliGRAM(s) Oral at bedtime  senna 2 Tablet(s) Oral at bedtime  sodium chloride 0.9%. 1000 milliLiter(s) (125 mL/Hr) IV Continuous <Continuous>    MEDICATIONS  (PRN):  bisacodyl Suppository 10 milliGRAM(s) Rectal daily PRN If no bowel movement  magnesium hydroxide Suspension 30 milliLiter(s) Oral daily PRN Constipation  melatonin 3 milliGRAM(s) Oral at bedtime PRN Insomnia  QUEtiapine 12.5 milliGRAM(s) Oral daily PRN agitation          VITALS:   T(C): 37.1 (12-10-22 @ 12:02), Max: 37.1 (12-10-22 @ 12:02)  HR: 73 (12-10-22 @ 12:02) (61 - 92)  BP: 125/74 (12-10-22 @ 12:02) (115/74 - 162/77)  RR: 18 (12-10-22 @ 12:02) (18 - 18)  SpO2: 94% (12-10-22 @ 12:02) (94% - 95%)  Wt(kg): --    PHYSICAL EXAM:  GENERAL: NAD, well-groomed, well-developed  HEAD:  Atraumatic, Normocephalic  EYES: EOMI, PERRLA, conjunctiva and sclera clear  ENMT: No tonsillar erythema, exudates, or enlargement; Moist mucous membranes, Good dentition, No lesions  NECK: Supple, No JVD, Normal thyroid  NERVOUS SYSTEM:  Alert & Oriented X1,  Motor Strength 5/5 B/L upper and lower extremities; DTRs 2+ intact and symmetric  CHEST/LUNG: Clear to percussion bilaterally; No rales, rhonchi, wheezing, or rubs  HEART: Regular rate and rhythm; No murmurs, rubs, or gallops  ABDOMEN: Soft, Nontender, Nondistended; Bowel sounds present  EXTREMITIES:  2+ Peripheral Pulses, No clubbing, cyanosis, or edema  LYMPH: No lymphadenopathy noted  SKIN: No rashes or lesions    LABS:        CBC Full  -  ( 09 Dec 2022 09:37 )  WBC Count : 12.11 K/uL  RBC Count : 3.31 M/uL  Hemoglobin : 10.0 g/dL  Hematocrit : 30.3 %  Platelet Count - Automated : 284 K/uL  Mean Cell Volume : 91.5 fl  Mean Cell Hemoglobin : 30.2 pg  Mean Cell Hemoglobin Concentration : 33.0 gm/dL  Auto Neutrophil # : x  Auto Lymphocyte # : x  Auto Monocyte # : x  Auto Eosinophil # : x  Auto Basophil # : x  Auto Neutrophil % : x  Auto Lymphocyte % : x  Auto Monocyte % : x  Auto Eosinophil % : x  Auto Basophil % : x    12-09    138  |  105  |  14  ----------------------------<  98  4.1   |  22  |  0.53    Ca    9.2      09 Dec 2022 09:37            CAPILLARY BLOOD GLUCOSE      POCT Blood Glucose.: 116 mg/dL (10 Dec 2022 06:31)      RADIOLOGY & ADDITIONAL TESTS:

## 2022-12-10 NOTE — RAPID RESPONSE TEAM SUMMARY - NSOTHERINTERVENTIONSRRT_GEN_ALL_CORE
CT Head noncontrast  Pelvis and femur Xray to assess recent ORIF  Primary team to bring down patient and follow-up imaging   1:1 for increased observation CT Head and Spine   Pelvis and femur Xray to assess recent ORIF  Primary team to bring down patient and follow-up imaging   1:1 for increased observation

## 2022-12-10 NOTE — RAPID RESPONSE TEAM SUMMARY - NSSITUATIONBACKGROUNDRRT_GEN_ALL_CORE
89 F with past medical history of HTN and dementia, admitted after fall with left hip fracture s/p open reduction and internal fixation. Course c/b atrial fibrillation with RVR. TTE showed moderate diastolic LV dysfunction with preserved LV EF with severe LAE noted. RRT called after patient found down on floor on back with bed alarm ringing. Fall not witnessed but patient's neighbor endorsed patient was staying she was going to get up to go to bathroom. Vitals signs: afebrile, /80s, HR 80s, SpO2 100% on RA. FSG wnl. Patient AOx1-2, PERRL without new gross neuro deficits. No complaints of chest pain, SOB, presyncopal symptoms. LLE intact with c/d/i dressing but unable to fully raise leg, which is same as prior per primary team. No gross abnormalities on previous labs.

## 2022-12-10 NOTE — PROVIDER CONTACT NOTE (OTHER) - ASSESSMENT
vital signs are unable to be obtained; pt is refusing to be touched and trying to bite x2 nurses at the bedside

## 2022-12-10 NOTE — CHART NOTE - NSCHARTNOTEFT_GEN_A_CORE
Ortho PA Note    Called by RN patient found on the floor this morning s/p unwitnessed fall.  As per RN, patient was found sitting on her bottom.  RRT called.    VSS    Exam: RR Team at bedside, Awake, disoriented to place and time, does not recall falling, follows commands, NAD  RLE: Skin intact, no shortening or external rotation appreciated, no edema or ecchymosis appreciated, compartments soft,   dull sensation grossly intact, +EHL/FHL/TA/GS  LLE:  Dressings c/d/i w tegaderms in place, skin intact, compartments soft, tender to touch on left buttock,  +EHL/FHL/TA/GS  SILT S/S/SP/DP  +DP/PT Pulses  No calf TTP     A/P: 89 y/o F w hx dementia POD#6 s/p L IM Nail     Pelvis Xray STAT  L Femur Xray STAT  Head CT STAT  Attending and family informed of above; Daughter-Jasmyn- to come see patient later this morning.  Will FU Pending imaging.    TARUN Lundy  Orthopedic Surgery  [id number] Ortho PA Note    Called by RN patient found on the floor this morning s/p unwitnessed fall.  As per RN, patient was found sitting on her bottom.  RRT called.    VSS    Exam: RR Team at bedside, Awake, disoriented to place and time, does not recall falling, follows commands, NAD  RLE: Skin intact, no shortening or external rotation appreciated, no edema or ecchymosis appreciated, compartments soft,   dull sensation grossly intact, +EHL/FHL/TA/GS  LLE:  Dressings c/d/i w tegaderms in place, skin intact, compartments soft, tender to touch on left buttock,  +EHL/FHL/TA/GS  SILT S/S/SP/DP  +DP/PT Pulses  No calf TTP     A/P: 89 y/o F w hx dementia POD#6 s/p L IM Nail     Pelvis Xray STAT  L Femur Xray STAT  Head CT Head/L and T spine STAT  Attending and family informed of above; Daughter-Jasmyn- to come see patient later this morning.  Will FU Pending imaging.    TARUN Lundy  Orthopedic Surgery  [id number] Ortho PA Note    Called by RN patient found on the floor this morning s/p unwitnessed fall.  As per RN, patient was found sitting on floor.  RRT called.    VSS    Exam: RR Team at bedside, Awake, disoriented to place and time, does not recall falling, follows commands, NAD  RLE: Skin intact, no shortening or external rotation appreciated, no edema or ecchymosis appreciated, compartments soft,   dull sensation grossly intact, +EHL/FHL/TA/GS  LLE:  Dressings c/d/i w tegaderms in place, skin intact, compartments soft, tender to touch on left buttock,  +EHL/FHL/TA/GS  SILT S/S/SP/DP  +DP/PT Pulses  No calf TTP     A/P: 89 y/o F w hx dementia POD#6 s/p L IM Nail     Pelvis Xray STAT  L Femur Xray STAT  Head CT Head/L and T spine STAT  Attending and family informed of above; Daughter-Jasmyn- to come see patient later this morning.  Will FU Pending imaging.    TARUN Lundy  Orthopedic Surgery  [id number]

## 2022-12-10 NOTE — PROVIDER CONTACT NOTE (OTHER) - ASSESSMENT
A&ox1. VSS. No c/o CP or discomfort and no s/s of distress. Pt currently calm and sleeping. [id number] on tele.

## 2022-12-10 NOTE — PROGRESS NOTE ADULT - SUBJECTIVE AND OBJECTIVE BOX
Pt seen/examined. Patient had increased agitation overnight. IM haldol given. Patient seen and examined at bedside. Doing well. Pain controlled.       Extremity- Left hip prox incision C/D steri' s intact (patient continues to remove dressing not touching steri strips)                             distal Aquacel- dressing C/D/I  Neuro-              Motor- (+) ankle DF/PF              Sensation- grossly intact to light touch              Calves- soft, nontender- PAS    89yFemale s/p L IMN POD6     Impression: Stable       Plan:   Continue present treatment                 Melanie 2.5 BID                  Cardiology recs appreciated                  Will discuss with medicine                 Out of bed, ambulate, weight bearing as tolerated                 Physical therapy follow up                 Continue to monitor

## 2022-12-10 NOTE — PROVIDER CONTACT NOTE (OTHER) - ASSESSMENT
axao1-2 (baseline)  vital signs stable  pt si unable to verbalized if she hit her head axao1-2 (baseline)  vital signs stable  pt is unable to verbalized if she hit her head; no bleeding or bruising noted; no LOC is noted

## 2022-12-11 LAB — SARS-COV-2 RNA SPEC QL NAA+PROBE: SIGNIFICANT CHANGE UP

## 2022-12-11 PROCEDURE — 93010 ELECTROCARDIOGRAM REPORT: CPT

## 2022-12-11 RX ORDER — SODIUM CHLORIDE 9 MG/ML
500 INJECTION INTRAMUSCULAR; INTRAVENOUS; SUBCUTANEOUS ONCE
Refills: 0 | Status: COMPLETED | OUTPATIENT
Start: 2022-12-11 | End: 2022-12-11

## 2022-12-11 RX ORDER — METOPROLOL TARTRATE 50 MG
5 TABLET ORAL ONCE
Refills: 0 | Status: COMPLETED | OUTPATIENT
Start: 2022-12-11 | End: 2022-12-11

## 2022-12-11 RX ADMIN — Medication 975 MILLIGRAM(S): at 11:37

## 2022-12-11 RX ADMIN — APIXABAN 2.5 MILLIGRAM(S): 2.5 TABLET, FILM COATED ORAL at 06:39

## 2022-12-11 RX ADMIN — SENNA PLUS 2 TABLET(S): 8.6 TABLET ORAL at 22:05

## 2022-12-11 RX ADMIN — LISINOPRIL 20 MILLIGRAM(S): 2.5 TABLET ORAL at 06:39

## 2022-12-11 RX ADMIN — Medication 100 MILLIGRAM(S): at 06:39

## 2022-12-11 RX ADMIN — POLYETHYLENE GLYCOL 3350 17 GRAM(S): 17 POWDER, FOR SOLUTION ORAL at 11:38

## 2022-12-11 RX ADMIN — Medication 975 MILLIGRAM(S): at 22:45

## 2022-12-11 RX ADMIN — QUETIAPINE FUMARATE 12.5 MILLIGRAM(S): 200 TABLET, FILM COATED ORAL at 22:06

## 2022-12-11 RX ADMIN — ATORVASTATIN CALCIUM 10 MILLIGRAM(S): 80 TABLET, FILM COATED ORAL at 22:05

## 2022-12-11 RX ADMIN — Medication 5 MILLIGRAM(S): at 14:41

## 2022-12-11 RX ADMIN — APIXABAN 2.5 MILLIGRAM(S): 2.5 TABLET, FILM COATED ORAL at 17:26

## 2022-12-11 RX ADMIN — Medication 975 MILLIGRAM(S): at 22:05

## 2022-12-11 RX ADMIN — CHLORHEXIDINE GLUCONATE 1 APPLICATION(S): 213 SOLUTION TOPICAL at 06:41

## 2022-12-11 RX ADMIN — Medication 975 MILLIGRAM(S): at 06:39

## 2022-12-11 RX ADMIN — Medication 975 MILLIGRAM(S): at 12:00

## 2022-12-11 RX ADMIN — SODIUM CHLORIDE 1000 MILLILITER(S): 9 INJECTION INTRAMUSCULAR; INTRAVENOUS; SUBCUTANEOUS at 14:00

## 2022-12-11 RX ADMIN — Medication 975 MILLIGRAM(S): at 08:00

## 2022-12-11 NOTE — CHART NOTE - NSCHARTNOTEFT_GEN_A_CORE
Received call from RN stating patient in Afib with RVR HR in 140s, /67, asymptomatic.  Patient received her morning dose of Metoprolol.  Ordered patient for a 1x NS Bolus 500cc and a 1x dose of Metoprolol succinate IV 5mg.  Made Medicine provider aware who also recommended having cardiology re-assess patient.  Cardiology team made aware.  Patient continues to be in HR of 140s after initial dose of IV Metoprolol.      Tarik Falk PA-C  Orthopedic Surgery Team  Team Pager #[phone]

## 2022-12-11 NOTE — CHART NOTE - NSCHARTNOTESELECT_GEN_ALL_CORE
Afib/Event Note
Elevated BP/Event Note
Agitation/Event Note
Agitation/Event Note
Fall/Event Note
Nutrition Services
Ortho POC/Event Note
ortho

## 2022-12-11 NOTE — PROGRESS NOTE ADULT - SUBJECTIVE AND OBJECTIVE BOX
89 yo female presenting after fall. pt states door was partially opened and tripped and fell onto her left side. afterward complained of pain to left leg and hip. Unable to ambulate since. No h/o prior injuries to leg. Denies any head trauma. no n/v. no chest pain or sob. is not on any blood thinners. Did not take anything for pain prior to arrival. Patient was brought to [facility] for further evaluation and treatment. In the ED she was found to have a left hip fracture and is s/p an Open reduction and internal fixation of the left hip. Patient seen now resting comfortably. appears pain is well controlled. Patient remains confused. Patient remains in alternating afib and NSR. Has remained rate controlled. Patient see resting comfortably.    MEDICATIONS  (STANDING):  acetaminophen     Tablet .. 975 milliGRAM(s) Oral every 8 hours  apixaban 2.5 milliGRAM(s) Oral every 12 hours  atorvastatin 10 milliGRAM(s) Oral at bedtime  chlorhexidine 2% Cloths 1 Application(s) Topical <User Schedule>  influenza  Vaccine (HIGH DOSE) 0.7 milliLiter(s) IntraMuscular once  lisinopril 20 milliGRAM(s) Oral daily  metoprolol succinate  milliGRAM(s) Oral daily  polyethylene glycol 3350 17 Gram(s) Oral daily  QUEtiapine 12.5 milliGRAM(s) Oral at bedtime  senna 2 Tablet(s) Oral at bedtime  sodium chloride 0.9%. 1000 milliLiter(s) (125 mL/Hr) IV Continuous <Continuous>    MEDICATIONS  (PRN):  bisacodyl Suppository 10 milliGRAM(s) Rectal daily PRN If no bowel movement  magnesium hydroxide Suspension 30 milliLiter(s) Oral daily PRN Constipation  melatonin 3 milliGRAM(s) Oral at bedtime PRN Insomnia  QUEtiapine 12.5 milliGRAM(s) Oral daily PRN agitation          VITALS:   T(C): 36.6 (12-11-22 @ 05:21), Max: 37.1 (12-10-22 @ 12:02)  HR: 75 (12-11-22 @ 05:21) (67 - 75)  BP: 164/78 (12-11-22 @ 05:21) (125/74 - 172/80)  RR: 18 (12-11-22 @ 05:21) (18 - 18)  SpO2: 94% (12-11-22 @ 05:21) (94% - 96%)  Wt(kg): --    PHYSICAL EXAM:  GENERAL: NAD, well-groomed, well-developed  HEAD:  Atraumatic, Normocephalic  EYES: EOMI, PERRLA, conjunctiva and sclera clear  ENMT: No tonsillar erythema, exudates, or enlargement; Moist mucous membranes, Good dentition, No lesions  NECK: Supple, No JVD, Normal thyroid  NERVOUS SYSTEM:  Alert & Oriented X1,  Motor Strength 5/5 B/L upper and lower extremities; DTRs 2+ intact and symmetric  CHEST/LUNG: Clear to percussion bilaterally; No rales, rhonchi, wheezing, or rubs  HEART: Regular rate and rhythm; No murmurs, rubs, or gallops  ABDOMEN: Soft, Nontender, Nondistended; Bowel sounds present  EXTREMITIES:  2+ Peripheral Pulses, No clubbing, cyanosis, or edema  LYMPH: No lymphadenopathy noted    LABS:                      CAPILLARY BLOOD GLUCOSE          RADIOLOGY & ADDITIONAL TESTS:

## 2022-12-11 NOTE — PROVIDER CONTACT NOTE (OTHER) - BACKGROUND
This is a 89y Female who presents to the ED today with c/o left hip pain and inability to bear weight s/p mechanical trip and fall today

## 2022-12-11 NOTE — PROVIDER CONTACT NOTE (OTHER) - DATE AND TIME:
08-Dec-2022 01:45
08-Dec-2022 03:22
11-Dec-2022 14:35
06-Dec-2022 00:45
10-Dec-2022 02:25
08-Dec-2022 10:34
06-Dec-2022 20:07
10-Dec-2022 06:22
10-Dec-2022 13:36
07-Dec-2022 21:45

## 2022-12-11 NOTE — PROGRESS NOTE ADULT - ASSESSMENT
89y Female s/p L IMN POD7     Impression: Stable       Plan:   Continue present treatment                 Melanie 2.5 BID                  Cardiology recs appreciated                  Will discuss with medicine                 Out of bed, ambulate, weight bearing as tolerated                 Physical therapy follow up                 Continue to monitor

## 2022-12-11 NOTE — PROVIDER CONTACT NOTE (OTHER) - RECOMMENDATIONS
Jorge Centeno MD notified.
Made PA aware.
Notify provider. Wrist restraints?
TARUN Benitez made aware.
IV lopressor
Notify MD
STAT Haldol IM was given
Jorge Centeno MD notified.
notify provider
As per Ortho PA, called [facility] because the ortho resident wasn't answering his pager x 2  STAT ct head, ct cervical spine, ct lumbar, ct thoracic, XR pelvic and femur, was ordered

## 2022-12-11 NOTE — PROVIDER CONTACT NOTE (OTHER) - SITUATION
6 bts wtc
Pt is screaming, attempting to pull out beard, pulled out IV,  pulled out L hip aquacel dsg. Continues to attempt to get OOB.
Pt w/ elevated HR.
pt was found laying on the floor with the bed alarm ringing
Failed DTV, Bladderscan 596mL
Pt w/ elevated HR.
Rapid Afib up to 140
pt has increased agitation, trying to get out of bed, pulling at her beard catheter and cardiac monitor
Pt BP 87/53, 
Pt w/ soft Bp.

## 2022-12-11 NOTE — PROVIDER CONTACT NOTE (OTHER) - ACTION/TREATMENT ORDERED:
As per TARUN Benitez, pt to receive bedtime dose of Seroquel and will continue to monitor pt's anxiety level. No further interventions ordered at this time.
PA made aware, iv lopressor given, EKG done, continue to monitor.
As per MD Jacobo, STAT EKG, will consult cardiologist to evaluate pt, STAT metoprolol IV push x1. No further interventions ordered at this time.
as per MD, straight cath, continue to monitor
As per MD Jacobo, STAT LR 500ml Bolus. Will come assess pt at bedside. No further interventions ordered at this time.
as per ortho, will order and give IV fluid bolus
Continue to monitor and maintain safety.
Provider notified and restraints applied. Will cont to monitor for safety.
constant observation order is in placed with 1:1 aide at the bedside

## 2022-12-11 NOTE — PROGRESS NOTE ADULT - SUBJECTIVE AND OBJECTIVE BOX
Orthopaedic Surgery Progress Note    Subjective:   Patient seen and examined. Patient had in hospital fall yesterday AM. CTH and XR of left hip performed were negative for acute changes. No acute events overnight. States pain is controlled.    Objective:  T(C): 36.6 (12-11-22 @ 05:21), Max: 37.1 (12-10-22 @ 12:02)  HR: 75 (12-11-22 @ 05:21) (67 - 75)  BP: 164/78 (12-11-22 @ 05:21) (125/74 - 172/80)  RR: 18 (12-11-22 @ 05:21) (18 - 18)  SpO2: 94% (12-11-22 @ 05:21) (94% - 96%)  Wt(kg): --    12-10 @ 07:01  -  12-11 @ 07:00  --------------------------------------------------------  IN: 770 mL / OUT: 1750 mL / NET: -980 mL        Physical Exam:    Extremity- Left hip prox incision C/D steri' s intact (patient continues to remove dressing not touching steri strips)                             distal Aquacel- dressing C/D/I  Neuro-              Motor- (+) ankle DF/PF              Sensation- grossly intact to light touch              Calves- soft, nontender- PAS                          10.0   12.11 )-----------( 284      ( 09 Dec 2022 09:37 )             30.3     12-09    138  |  105  |  14  ----------------------------<  98  4.1   |  22  |  0.53    Ca    9.2      09 Dec 2022 09:37

## 2022-12-12 PROCEDURE — 99233 SBSQ HOSP IP/OBS HIGH 50: CPT | Mod: GC

## 2022-12-12 RX ADMIN — Medication 975 MILLIGRAM(S): at 05:26

## 2022-12-12 RX ADMIN — QUETIAPINE FUMARATE 12.5 MILLIGRAM(S): 200 TABLET, FILM COATED ORAL at 22:05

## 2022-12-12 RX ADMIN — Medication 975 MILLIGRAM(S): at 06:56

## 2022-12-12 RX ADMIN — Medication 975 MILLIGRAM(S): at 13:57

## 2022-12-12 RX ADMIN — APIXABAN 2.5 MILLIGRAM(S): 2.5 TABLET, FILM COATED ORAL at 18:38

## 2022-12-12 RX ADMIN — LISINOPRIL 20 MILLIGRAM(S): 2.5 TABLET ORAL at 05:25

## 2022-12-12 RX ADMIN — APIXABAN 2.5 MILLIGRAM(S): 2.5 TABLET, FILM COATED ORAL at 05:28

## 2022-12-12 RX ADMIN — ATORVASTATIN CALCIUM 10 MILLIGRAM(S): 80 TABLET, FILM COATED ORAL at 22:06

## 2022-12-12 RX ADMIN — POLYETHYLENE GLYCOL 3350 17 GRAM(S): 17 POWDER, FOR SOLUTION ORAL at 10:47

## 2022-12-12 RX ADMIN — Medication 975 MILLIGRAM(S): at 22:05

## 2022-12-12 RX ADMIN — Medication 975 MILLIGRAM(S): at 22:40

## 2022-12-12 RX ADMIN — Medication 3 MILLIGRAM(S): at 22:08

## 2022-12-12 RX ADMIN — Medication 100 MILLIGRAM(S): at 05:26

## 2022-12-12 RX ADMIN — SENNA PLUS 2 TABLET(S): 8.6 TABLET ORAL at 22:05

## 2022-12-12 RX ADMIN — Medication 975 MILLIGRAM(S): at 15:42

## 2022-12-12 RX ADMIN — CHLORHEXIDINE GLUCONATE 1 APPLICATION(S): 213 SOLUTION TOPICAL at 10:47

## 2022-12-12 NOTE — PROGRESS NOTE ADULT - SUBJECTIVE AND OBJECTIVE BOX
89 yo female presenting after fall. pt states door was partially opened and tripped and fell onto her left side. afterward complained of pain to left leg and hip. Unable to ambulate since. No h/o prior injuries to leg. Denies any head trauma. no n/v. no chest pain or sob. is not on any blood thinners. Did not take anything for pain prior to arrival. Patient was brought to [facility] for further evaluation and treatment. In the ED she was found to have a left hip fracture and is s/p an Open reduction and internal fixation of the left hip. Patient seen now resting comfortably. appears pain is well controlled. Patient remains confused. Patient remains in alternating afib and NSR. Pt had an episode of rapid afib last night Patient see resting comfortably.      MEDICATIONS  (STANDING):  acetaminophen     Tablet .. 975 milliGRAM(s) Oral every 8 hours  apixaban 2.5 milliGRAM(s) Oral every 12 hours  atorvastatin 10 milliGRAM(s) Oral at bedtime  chlorhexidine 2% Cloths 1 Application(s) Topical <User Schedule>  influenza  Vaccine (HIGH DOSE) 0.7 milliLiter(s) IntraMuscular once  lisinopril 20 milliGRAM(s) Oral daily  metoprolol succinate  milliGRAM(s) Oral daily  polyethylene glycol 3350 17 Gram(s) Oral daily  QUEtiapine 12.5 milliGRAM(s) Oral at bedtime  senna 2 Tablet(s) Oral at bedtime  sodium chloride 0.9%. 1000 milliLiter(s) (125 mL/Hr) IV Continuous <Continuous>    MEDICATIONS  (PRN):  bisacodyl Suppository 10 milliGRAM(s) Rectal daily PRN If no bowel movement  magnesium hydroxide Suspension 30 milliLiter(s) Oral daily PRN Constipation  melatonin 3 milliGRAM(s) Oral at bedtime PRN Insomnia  QUEtiapine 12.5 milliGRAM(s) Oral daily PRN agitation          VITALS:   T(C): 36.8 (12-12-22 @ 14:46), Max: 36.8 (12-12-22 @ 04:12)  HR: 60 (12-12-22 @ 14:46) (60 - 90)  BP: 156/65 (12-12-22 @ 14:46) (156/65 - 174/77)  RR: 18 (12-12-22 @ 14:46) (18 - 18)  SpO2: 94% (12-12-22 @ 14:46) (94% - 95%)  Wt(kg): --    PHYSICAL EXAM:  GENERAL: NAD, well-groomed, well-developed  HEAD:  Atraumatic, Normocephalic  EYES: EOMI, PERRLA, conjunctiva and sclera clear  ENMT: No tonsillar erythema, exudates, or enlargement; Moist mucous membranes, Good dentition, No lesions  NECK: Supple, No JVD, Normal thyroid  NERVOUS SYSTEM:  Alert & Oriented X1,  Motor Strength 5/5 B/L upper and lower extremities; DTRs 2+ intact and symmetric  CHEST/LUNG: Clear to percussion bilaterally; No rales, rhonchi, wheezing, or rubs  HEART: Regular rate and rhythm; No murmurs, rubs, or gallops  ABDOMEN: Soft, Nontender, Nondistended; Bowel sounds present  EXTREMITIES:  2+ Peripheral Pulses, No clubbing, cyanosis, or edema  LYMPH: No lymphadenopathy noted    LABS:                      CAPILLARY BLOOD GLUCOSE          RADIOLOGY & ADDITIONAL TESTS:

## 2022-12-12 NOTE — PROGRESS NOTE ADULT - ATTENDING COMMENTS
89 year old woman with fall, hip fracture, ORIF manifesting paroxysmal atrial fibrillation in post-operative period. Episodes now better controlled, well tolerated. Maintain on beta blocker and reduced dose apixaban, adjusted based on age, weight and renal function.    To contact call Cardiology Fellow or Attending as listed on [email or web address] password: [email or web address].

## 2022-12-12 NOTE — PROGRESS NOTE ADULT - ASSESSMENT
89 F with past medical history of HTN and dementia, admitted after fall with left hip fracture, now s/p open reduction and internal fixation. Cardiology was consulted for atrial fibrillation with RVR.  Echo shows moderate diastolic LV dysfunction with preserved LV EF; Severe LAE noted. Patient comfortable at present, without chest pain, SOB or palpitations.        REC:  #1.  Atrial fibrillation with RVR - Atrial fibrillation most likely in the setting of recent surgery and pain, in patient with moderate diastolic LV dysfunction and severe LAE.  Patient's CHADSVASc score is 4  - Continue metoprolol succinate to 100mg daily   - Can give patient metoprolol 5mg IV pushes (push over two minutes), up to three times,  by 5 minutes for HR>130  - Continue adjusted apixaban dose for AC  - Resume telemetry   - Rate controlled     2.  HTN  - continue lisinopril  - continue metoprolol  - Management per primary team, continues to be hypertensive       Cardiology will sign off at this time, please recall with any questions or concerns    Fay Hood MD  Cardiology Fellow     Recommendations are preliminary until cosigned by attending    89 F with past medical history of HTN and dementia, admitted after fall with left hip fracture, now s/p open reduction and internal fixation. Cardiology was consulted for atrial fibrillation with RVR.  Echo shows moderate diastolic LV dysfunction with preserved LV EF; Severe LAE noted. Patient comfortable at present, without chest pain, SOB or palpitations.      REC:  #1.  Atrial fibrillation with RVR - Atrial fibrillation most likely in the setting of recent surgery and pain, in patient with moderate diastolic LV dysfunction and severe LAE.  Patient's CHADSVASc score is 4  - Continue metoprolol succinate to 100mg daily   - Can give patient metoprolol 5mg IV pushes (push over two minutes), up to three times,  by 5 minutes for HR>130  - Continue adjusted apixaban dose for AC  - Resume telemetry   - Rate controlled     2.  HTN  - continue lisinopril  - continue metoprolol  - Management per primary team, continues to be hypertensive       Cardiology will sign off at this time, please recall with any questions or concerns    Fay Hood MD  Cardiology Fellow

## 2022-12-12 NOTE — PROGRESS NOTE ADULT - SUBJECTIVE AND OBJECTIVE BOX
Overnight Events: None     Review Of Systems: No chest pain, shortness of breath, or palpitations            Current Meds:  acetaminophen     Tablet .. 975 milliGRAM(s) Oral every 8 hours  apixaban 2.5 milliGRAM(s) Oral every 12 hours  atorvastatin 10 milliGRAM(s) Oral at bedtime  bisacodyl Suppository 10 milliGRAM(s) Rectal daily PRN  chlorhexidine 2% Cloths 1 Application(s) Topical <User Schedule>  influenza  Vaccine (HIGH DOSE) 0.7 milliLiter(s) IntraMuscular once  lisinopril 20 milliGRAM(s) Oral daily  magnesium hydroxide Suspension 30 milliLiter(s) Oral daily PRN  melatonin 3 milliGRAM(s) Oral at bedtime PRN  metoprolol succinate  milliGRAM(s) Oral daily  polyethylene glycol 3350 17 Gram(s) Oral daily  QUEtiapine 12.5 milliGRAM(s) Oral at bedtime  QUEtiapine 12.5 milliGRAM(s) Oral daily PRN  senna 2 Tablet(s) Oral at bedtime  sodium chloride 0.9%. 1000 milliLiter(s) IV Continuous <Continuous>      Vitals:  T(F): 98.2 (12-12), Max: 98.2 (12-12)  HR: 72 (12-12) (67 - 126)  BP: 174/77 (12-12) (118/67 - 174/77)  RR: 18 (12-12)  SpO2: 94% (12-12)  I&O's Summary    11 Dec 2022 07:01  -  12 Dec 2022 07:00  --------------------------------------------------------  IN: 680 mL / OUT: 1350 mL / NET: -670 mL    12 Dec 2022 07:01  -  12 Dec 2022 12:32  --------------------------------------------------------  IN: 180 mL / OUT: 0 mL / NET: 180 mL        Physical Exam:      GENERAL: No acute distress, thin elderly female   HEAD:  Atraumatic, Normocephalic  ENT: conjunctiva and sclera clear, Neck supple, No JVD, moist mucosa  CHEST/LUNG: Clear to auscultation bilaterally; No wheeze, equal breath sounds bilaterally   HEART: regular rate and rhythm; No murmurs, rubs, or gallops  ABDOMEN: Soft, Nontender, Nondistended  EXTREMITIES:  No clubbing, cyanosis, or edema      Interpretation of Telemetry: Rate controlled overnight, fluctuating between sinus and AF, rates in the 60a-80s           Overnight Events: None     Review Of Systems: No chest pain, shortness of breath, or palpitations            Current Meds:  acetaminophen     Tablet .. 975 milliGRAM(s) Oral every 8 hours  apixaban 2.5 milliGRAM(s) Oral every 12 hours  atorvastatin 10 milliGRAM(s) Oral at bedtime  bisacodyl Suppository 10 milliGRAM(s) Rectal daily PRN  chlorhexidine 2% Cloths 1 Application(s) Topical <User Schedule>  influenza  Vaccine (HIGH DOSE) 0.7 milliLiter(s) IntraMuscular once  lisinopril 20 milliGRAM(s) Oral daily  magnesium hydroxide Suspension 30 milliLiter(s) Oral daily PRN  melatonin 3 milliGRAM(s) Oral at bedtime PRN  metoprolol succinate  milliGRAM(s) Oral daily  polyethylene glycol 3350 17 Gram(s) Oral daily  QUEtiapine 12.5 milliGRAM(s) Oral at bedtime  QUEtiapine 12.5 milliGRAM(s) Oral daily PRN  senna 2 Tablet(s) Oral at bedtime  sodium chloride 0.9%. 1000 milliLiter(s) IV Continuous <Continuous>      Vitals:  T(F): 98.2 (12-12), Max: 98.2 (12-12)  HR: 72 (12-12) (67 - 126)  BP: 174/77 (12-12) (118/67 - 174/77)  RR: 18 (12-12)  SpO2: 94% (12-12)  I&O's Summary    11 Dec 2022 07:01  -  12 Dec 2022 07:00  --------------------------------------------------------  IN: 680 mL / OUT: 1350 mL / NET: -670 mL    12 Dec 2022 07:01  -  12 Dec 2022 12:32  --------------------------------------------------------  IN: 180 mL / OUT: 0 mL / NET: 180 mL        Physical Exam:      GENERAL: No acute distress, thin elderly female   HEAD:  Atraumatic, Normocephalic  ENT: conjunctiva and sclera clear, Neck supple, No JVD, moist mucosa  CHEST/LUNG: Clear to auscultation bilaterally; No wheeze, equal breath sounds bilaterally   HEART: regular rate and rhythm; No murmurs, rubs, or gallops  ABDOMEN: Soft, Nontender, Nondistended  EXTREMITIES:  No clubbing, cyanosis, or edema      Interpretation of Telemetry: Rate controlled overnight, fluctuating between sinus and AF, rates 60-80s

## 2022-12-12 NOTE — PROGRESS NOTE ADULT - SUBJECTIVE AND OBJECTIVE BOX
Pt seen/examined. Doing well. Pain controlled. No acute overnight complaints or events.    T(C): 36.3 (12-11-22 @ 20:59), Max: 36.7 (12-11-22 @ 11:41)  HR: 67 (12-11-22 @ 20:59) (67 - 126)  BP: 173/72 (12-11-22 @ 20:59) (118/67 - 173/72)  RR: 18 (12-11-22 @ 20:59) (18 - 19)  SpO2: 95% (12-11-22 @ 20:59) (94% - 95%)  Wt(kg): --  - Gen: NAD    Physical Exam:    Extremity- Left hip prox incision C/D steri' s intact (patient continues to remove dressing not touching steri strips)                             distal Aquacel- dressing C/D/I  Neuro-              Motor- (+) ankle DF/PF              Sensation- grossly intact to light touch              Calves- soft, nontender- PAS    89y Female s/p L IMN POD8     Impression: Stable       Plan:   Continue present treatment                 Melanie 2.5 BID                  Cardiology recs appreciated                  Will discuss with medicine                 Out of bed, ambulate, weight bearing as tolerated                 Physical therapy follow up                 Continue to monitor

## 2022-12-13 RX ADMIN — POLYETHYLENE GLYCOL 3350 17 GRAM(S): 17 POWDER, FOR SOLUTION ORAL at 12:03

## 2022-12-13 RX ADMIN — APIXABAN 2.5 MILLIGRAM(S): 2.5 TABLET, FILM COATED ORAL at 05:42

## 2022-12-13 RX ADMIN — Medication 975 MILLIGRAM(S): at 13:11

## 2022-12-13 RX ADMIN — LISINOPRIL 20 MILLIGRAM(S): 2.5 TABLET ORAL at 05:42

## 2022-12-13 RX ADMIN — APIXABAN 2.5 MILLIGRAM(S): 2.5 TABLET, FILM COATED ORAL at 17:24

## 2022-12-13 RX ADMIN — Medication 975 MILLIGRAM(S): at 06:30

## 2022-12-13 RX ADMIN — QUETIAPINE FUMARATE 12.5 MILLIGRAM(S): 200 TABLET, FILM COATED ORAL at 22:18

## 2022-12-13 RX ADMIN — Medication 975 MILLIGRAM(S): at 13:38

## 2022-12-13 RX ADMIN — Medication 975 MILLIGRAM(S): at 22:18

## 2022-12-13 RX ADMIN — Medication 975 MILLIGRAM(S): at 05:42

## 2022-12-13 RX ADMIN — SENNA PLUS 2 TABLET(S): 8.6 TABLET ORAL at 22:18

## 2022-12-13 RX ADMIN — Medication 975 MILLIGRAM(S): at 22:48

## 2022-12-13 RX ADMIN — Medication 100 MILLIGRAM(S): at 05:42

## 2022-12-13 RX ADMIN — ATORVASTATIN CALCIUM 10 MILLIGRAM(S): 80 TABLET, FILM COATED ORAL at 22:18

## 2022-12-13 RX ADMIN — CHLORHEXIDINE GLUCONATE 1 APPLICATION(S): 213 SOLUTION TOPICAL at 05:42

## 2022-12-13 NOTE — PROGRESS NOTE ADULT - ASSESSMENT
89y Female s/p L IMN POD9    Plan:     -monitor HR, currently no more episodes of AF  -Melanie 2.5 BID   -Out of bed, ambulate, weight bearing as tolerated  -dispo to [facility] with beard  -Seroquel as needed for agitation

## 2022-12-13 NOTE — PROGRESS NOTE ADULT - SUBJECTIVE AND OBJECTIVE BOX
Orthopedic Surgery Progress Note     S: Patient seen and examined today. No acute events overnight. Pain is well controlled. Denies f/c, chest pain, shortness of breath, dizziness.    MEDICATIONS  (STANDING):  acetaminophen     Tablet .. 975 milliGRAM(s) Oral every 8 hours  apixaban 2.5 milliGRAM(s) Oral every 12 hours  atorvastatin 10 milliGRAM(s) Oral at bedtime  chlorhexidine 2% Cloths 1 Application(s) Topical <User Schedule>  influenza  Vaccine (HIGH DOSE) 0.7 milliLiter(s) IntraMuscular once  lisinopril 20 milliGRAM(s) Oral daily  metoprolol succinate  milliGRAM(s) Oral daily  polyethylene glycol 3350 17 Gram(s) Oral daily  QUEtiapine 12.5 milliGRAM(s) Oral at bedtime  senna 2 Tablet(s) Oral at bedtime  sodium chloride 0.9%. 1000 milliLiter(s) (125 mL/Hr) IV Continuous <Continuous>    MEDICATIONS  (PRN):  bisacodyl Suppository 10 milliGRAM(s) Rectal daily PRN If no bowel movement  magnesium hydroxide Suspension 30 milliLiter(s) Oral daily PRN Constipation  melatonin 3 milliGRAM(s) Oral at bedtime PRN Insomnia  QUEtiapine 12.5 milliGRAM(s) Oral daily PRN agitation      Physical Exam:  Gen: NAD  Lower Extremity:  LLE proximal dressing c/d/i, steris intact; distal aquacel c/d/i  SILT S/S/DP/SP/T  +EHL/FHL/TA/GSC  Compartments soft/non-tender  Toes warm, Cap refill brisk/warm and perfused, +DP pulse         Vital Signs Last 24 Hrs  T(C): 36.3 (12 Dec 2022 20:14), Max: 36.8 (12 Dec 2022 14:46)  T(F): 97.4 (12 Dec 2022 20:14), Max: 98.3 (12 Dec 2022 14:46)  HR: 71 (12 Dec 2022 20:14) (60 - 71)  BP: 159/75 (12 Dec 2022 20:14) (156/65 - 159/75)  BP(mean): --  RR: 19 (12 Dec 2022 20:14) (18 - 19)  SpO2: 96% (12 Dec 2022 20:14) (94% - 96%)    Parameters below as of 12 Dec 2022 14:46  Patient On (Oxygen Delivery Method): room air        12-11-22 @ 07:01  -  12-12-22 @ 07:00  --------------------------------------------------------  IN: 680 mL / OUT: 1350 mL / NET: -670 mL    12-12-22 @ 07:01  -  12-13-22 @ 06:23  --------------------------------------------------------  IN: 180 mL / OUT: 550 mL / NET: -370 mL                    LABS:

## 2022-12-13 NOTE — PROGRESS NOTE ADULT - SUBJECTIVE AND OBJECTIVE BOX
89 yo female presenting after fall. pt states door was partially opened and tripped and fell onto her left side. afterward complained of pain to left leg and hip. Unable to ambulate since. No h/o prior injuries to leg. Denies any head trauma. no n/v. no chest pain or sob. is not on any blood thinners. Did not take anything for pain prior to arrival. Patient was brought to [facility] for further evaluation and treatment. In the ED she was found to have a left hip fracture and is s/p an Open reduction and internal fixation of the left hip. Patient seen now resting comfortably. appears pain is well controlled. Patient remains confused. Patient remains in alternating afib and NSR. Pt has remained in NSR. seen OOB working with physical therapy       MEDICATIONS  (STANDING):  acetaminophen     Tablet .. 975 milliGRAM(s) Oral every 8 hours  apixaban 2.5 milliGRAM(s) Oral every 12 hours  atorvastatin 10 milliGRAM(s) Oral at bedtime  chlorhexidine 2% Cloths 1 Application(s) Topical <User Schedule>  influenza  Vaccine (HIGH DOSE) 0.7 milliLiter(s) IntraMuscular once  lisinopril 20 milliGRAM(s) Oral daily  metoprolol succinate  milliGRAM(s) Oral daily  polyethylene glycol 3350 17 Gram(s) Oral daily  QUEtiapine 12.5 milliGRAM(s) Oral at bedtime  senna 2 Tablet(s) Oral at bedtime  sodium chloride 0.9%. 1000 milliLiter(s) (125 mL/Hr) IV Continuous <Continuous>    MEDICATIONS  (PRN):  bisacodyl Suppository 10 milliGRAM(s) Rectal daily PRN If no bowel movement  magnesium hydroxide Suspension 30 milliLiter(s) Oral daily PRN Constipation  melatonin 3 milliGRAM(s) Oral at bedtime PRN Insomnia  QUEtiapine 12.5 milliGRAM(s) Oral daily PRN agitation          VITALS:   T(C): 36.4 (12-13-22 @ 11:48), Max: 36.8 (12-12-22 @ 14:46)  HR: 68 (12-13-22 @ 11:48) (60 - 71)  BP: 142/62 (12-13-22 @ 11:48) (142/62 - 159/75)  RR: 18 (12-13-22 @ 11:48) (18 - 19)  SpO2: 94% (12-13-22 @ 11:48) (94% - 96%)  Wt(kg): --    PHYSICAL EXAM:  GENERAL: NAD, well-groomed, well-developed  HEAD:  Atraumatic, Normocephalic  EYES: EOMI, PERRLA, conjunctiva and sclera clear  ENMT: No tonsillar erythema, exudates, or enlargement; Moist mucous membranes, Good dentition, No lesions  NECK: Supple, No JVD, Normal thyroid  NERVOUS SYSTEM:  Alert & Oriented X1,  Motor Strength 5/5 B/L upper and lower extremities; DTRs 2+ intact and symmetric  CHEST/LUNG: Clear to percussion bilaterally; No rales, rhonchi, wheezing, or rubs  HEART: Regular rate and rhythm; No murmurs, rubs, or gallops  ABDOMEN: Soft, Nontender, Nondistended; Bowel sounds present  EXTREMITIES:  2+ Peripheral Pulses, No clubbing, cyanosis, or edema  LYMPH: No lymphadenopathy noted      LABS:                      CAPILLARY BLOOD GLUCOSE          RADIOLOGY & ADDITIONAL TESTS:

## 2022-12-14 DIAGNOSIS — N39.0 URINARY TRACT INFECTION, SITE NOT SPECIFIED: ICD-10-CM

## 2022-12-14 LAB
ANION GAP SERPL CALC-SCNC: 10 MMOL/L — SIGNIFICANT CHANGE UP (ref 5–17)
APPEARANCE UR: ABNORMAL
BACTERIA # UR AUTO: ABNORMAL
BILIRUB UR-MCNC: NEGATIVE — SIGNIFICANT CHANGE UP
BUN SERPL-MCNC: 14 MG/DL — SIGNIFICANT CHANGE UP (ref 7–23)
CALCIUM SERPL-MCNC: 9.6 MG/DL — SIGNIFICANT CHANGE UP (ref 8.4–10.5)
CHLORIDE SERPL-SCNC: 101 MMOL/L — SIGNIFICANT CHANGE UP (ref 96–108)
CO2 SERPL-SCNC: 25 MMOL/L — SIGNIFICANT CHANGE UP (ref 22–31)
COD CRY URNS QL: ABNORMAL
COLOR SPEC: YELLOW — SIGNIFICANT CHANGE UP
CREAT SERPL-MCNC: 0.55 MG/DL — SIGNIFICANT CHANGE UP (ref 0.5–1.3)
DIFF PNL FLD: ABNORMAL
EGFR: 85 ML/MIN/1.73M2 — SIGNIFICANT CHANGE UP
EPI CELLS # UR: 0 /HPF — SIGNIFICANT CHANGE UP
GLUCOSE SERPL-MCNC: 95 MG/DL — SIGNIFICANT CHANGE UP (ref 70–99)
GLUCOSE UR QL: NEGATIVE — SIGNIFICANT CHANGE UP
HCT VFR BLD CALC: 33.5 % — LOW (ref 34.5–45)
HGB BLD-MCNC: 10.8 G/DL — LOW (ref 11.5–15.5)
HYALINE CASTS # UR AUTO: 2 /LPF — SIGNIFICANT CHANGE UP (ref 0–2)
KETONES UR-MCNC: NEGATIVE — SIGNIFICANT CHANGE UP
LEUKOCYTE ESTERASE UR-ACNC: ABNORMAL
MCHC RBC-ENTMCNC: 30.5 PG — SIGNIFICANT CHANGE UP (ref 27–34)
MCHC RBC-ENTMCNC: 32.2 GM/DL — SIGNIFICANT CHANGE UP (ref 32–36)
MCV RBC AUTO: 94.6 FL — SIGNIFICANT CHANGE UP (ref 80–100)
NITRITE UR-MCNC: POSITIVE
NRBC # BLD: 0 /100 WBCS — SIGNIFICANT CHANGE UP (ref 0–0)
PH UR: 6 — SIGNIFICANT CHANGE UP (ref 5–8)
PLATELET # BLD AUTO: 460 K/UL — HIGH (ref 150–400)
POTASSIUM SERPL-MCNC: 3.9 MMOL/L — SIGNIFICANT CHANGE UP (ref 3.5–5.3)
POTASSIUM SERPL-SCNC: 3.9 MMOL/L — SIGNIFICANT CHANGE UP (ref 3.5–5.3)
PROT UR-MCNC: ABNORMAL
RBC # BLD: 3.54 M/UL — LOW (ref 3.8–5.2)
RBC # FLD: 14.5 % — SIGNIFICANT CHANGE UP (ref 10.3–14.5)
RBC CASTS # UR COMP ASSIST: SIGNIFICANT CHANGE UP /HPF (ref 0–4)
SODIUM SERPL-SCNC: 136 MMOL/L — SIGNIFICANT CHANGE UP (ref 135–145)
SP GR SPEC: 1.02 — SIGNIFICANT CHANGE UP (ref 1.01–1.02)
UROBILINOGEN FLD QL: ABNORMAL
WBC # BLD: 8.37 K/UL — SIGNIFICANT CHANGE UP (ref 3.8–10.5)
WBC # FLD AUTO: 8.37 K/UL — SIGNIFICANT CHANGE UP (ref 3.8–10.5)
WBC UR QL: >50 /HPF — SIGNIFICANT CHANGE UP (ref 0–5)

## 2022-12-14 RX ORDER — CEFTRIAXONE 500 MG/1
1000 INJECTION, POWDER, FOR SOLUTION INTRAMUSCULAR; INTRAVENOUS EVERY 24 HOURS
Refills: 0 | Status: DISCONTINUED | OUTPATIENT
Start: 2022-12-14 | End: 2022-12-16

## 2022-12-14 RX ADMIN — Medication 975 MILLIGRAM(S): at 05:38

## 2022-12-14 RX ADMIN — SENNA PLUS 2 TABLET(S): 8.6 TABLET ORAL at 22:19

## 2022-12-14 RX ADMIN — CHLORHEXIDINE GLUCONATE 1 APPLICATION(S): 213 SOLUTION TOPICAL at 05:39

## 2022-12-14 RX ADMIN — QUETIAPINE FUMARATE 12.5 MILLIGRAM(S): 200 TABLET, FILM COATED ORAL at 22:20

## 2022-12-14 RX ADMIN — Medication 975 MILLIGRAM(S): at 22:20

## 2022-12-14 RX ADMIN — LISINOPRIL 20 MILLIGRAM(S): 2.5 TABLET ORAL at 05:39

## 2022-12-14 RX ADMIN — POLYETHYLENE GLYCOL 3350 17 GRAM(S): 17 POWDER, FOR SOLUTION ORAL at 14:24

## 2022-12-14 RX ADMIN — ATORVASTATIN CALCIUM 10 MILLIGRAM(S): 80 TABLET, FILM COATED ORAL at 22:20

## 2022-12-14 RX ADMIN — Medication 975 MILLIGRAM(S): at 17:03

## 2022-12-14 RX ADMIN — Medication 975 MILLIGRAM(S): at 14:24

## 2022-12-14 RX ADMIN — APIXABAN 2.5 MILLIGRAM(S): 2.5 TABLET, FILM COATED ORAL at 17:06

## 2022-12-14 RX ADMIN — Medication 975 MILLIGRAM(S): at 06:02

## 2022-12-14 RX ADMIN — Medication 100 MILLIGRAM(S): at 05:39

## 2022-12-14 RX ADMIN — CEFTRIAXONE 100 MILLIGRAM(S): 500 INJECTION, POWDER, FOR SOLUTION INTRAMUSCULAR; INTRAVENOUS at 19:10

## 2022-12-14 RX ADMIN — Medication 975 MILLIGRAM(S): at 23:10

## 2022-12-14 RX ADMIN — APIXABAN 2.5 MILLIGRAM(S): 2.5 TABLET, FILM COATED ORAL at 05:39

## 2022-12-14 NOTE — PROGRESS NOTE ADULT - SUBJECTIVE AND OBJECTIVE BOX
Pt seen/examined. Doing well. Pain controlled. No acute overnight complaints or events.    T(C): 36.6 (12-14-22 @ 05:43), Max: 36.6 (12-13-22 @ 20:17)  HR: 70 (12-14-22 @ 05:43) (68 - 75)  BP: 159/78 (12-14-22 @ 05:43) (142/62 - 161/74)  RR: 18 (12-14-22 @ 05:43) (18 - 18)  SpO2: 97% (12-14-22 @ 05:43) (94% - 97%)  Wt(kg): --  - Gen: NAD    Physical Exam:  Gen: NAD  Lower Extremity:  LLE proximal dressing c/d/i, steris intact; distal aquacel c/d/i  SILT S/S/DP/SP/T  +EHL/FHL/TA/GSC  Compartments soft/non-tender  Toes warm, Cap refill brisk/warm and perfused, +DP pulse       89y Female s/p L IMN POD10    Plan:     -monitor HR  -Melanie 2.5 BID   -Out of bed, ambulate, weight bearing as tolerated  -dispo to [facility] with beard  -Seroquel as needed for agitation

## 2022-12-14 NOTE — PROGRESS NOTE ADULT - SUBJECTIVE AND OBJECTIVE BOX
89 yo female presenting after fall. pt states door was partially opened and tripped and fell onto her left side. afterward complained of pain to left leg and hip. Unable to ambulate since. No h/o prior injuries to leg. Denies any head trauma. no n/v. no chest pain or sob. is not on any blood thinners. Did not take anything for pain prior to arrival. Patient was brought to [facility] for further evaluation and treatment. In the ED she was found to have a left hip fracture and is s/p an Open reduction and internal fixation of the left hip. Patient seen now resting comfortably. appears pain is well controlled. Patient remains confused. Patient remains in alternating afib and NSR. Pt has remained in NSR. seen OOB working with physical therapy       MEDICATIONS  (STANDING):  acetaminophen     Tablet .. 975 milliGRAM(s) Oral every 8 hours  apixaban 2.5 milliGRAM(s) Oral every 12 hours  atorvastatin 10 milliGRAM(s) Oral at bedtime  chlorhexidine 2% Cloths 1 Application(s) Topical <User Schedule>  influenza  Vaccine (HIGH DOSE) 0.7 milliLiter(s) IntraMuscular once  lisinopril 20 milliGRAM(s) Oral daily  metoprolol succinate  milliGRAM(s) Oral daily  polyethylene glycol 3350 17 Gram(s) Oral daily  QUEtiapine 12.5 milliGRAM(s) Oral at bedtime  senna 2 Tablet(s) Oral at bedtime  sodium chloride 0.9%. 1000 milliLiter(s) (125 mL/Hr) IV Continuous <Continuous>    MEDICATIONS  (PRN):  bisacodyl Suppository 10 milliGRAM(s) Rectal daily PRN If no bowel movement  magnesium hydroxide Suspension 30 milliLiter(s) Oral daily PRN Constipation  melatonin 3 milliGRAM(s) Oral at bedtime PRN Insomnia  QUEtiapine 12.5 milliGRAM(s) Oral daily PRN agitation          VITALS:   T(C): 36.4 (12-14-22 @ 12:49), Max: 36.6 (12-13-22 @ 20:17)  HR: 56 (12-14-22 @ 12:49) (56 - 75)  BP: 116/60 (12-14-22 @ 12:49) (116/60 - 161/74)  RR: 18 (12-14-22 @ 12:49) (18 - 18)  SpO2: 95% (12-14-22 @ 12:49) (95% - 97%)  Wt(kg): --    PHYSICAL EXAM:  GENERAL: NAD, well-groomed, well-developed  HEAD:  Atraumatic, Normocephalic  EYES: EOMI, PERRLA, conjunctiva and sclera clear  ENMT: No tonsillar erythema, exudates, or enlargement; Moist mucous membranes, Good dentition, No lesions  NECK: Supple, No JVD, Normal thyroid  NERVOUS SYSTEM:  Alert & Oriented X1,  Motor Strength 5/5 B/L upper and lower extremities; DTRs 2+ intact and symmetric  CHEST/LUNG: Clear to percussion bilaterally; No rales, rhonchi, wheezing, or rubs  HEART: Regular rate and rhythm; No murmurs, rubs, or gallops  ABDOMEN: Soft, Nontender, Nondistended; Bowel sounds present  EXTREMITIES:  2+ Peripheral Pulses, No clubbing, cyanosis, or edema  LYMPH: No lymphadenopathy noted      LABS:        CBC Full  -  ( 14 Dec 2022 06:39 )  WBC Count : 8.37 K/uL  RBC Count : 3.54 M/uL  Hemoglobin : 10.8 g/dL  Hematocrit : 33.5 %  Platelet Count - Automated : 460 K/uL  Mean Cell Volume : 94.6 fl  Mean Cell Hemoglobin : 30.5 pg  Mean Cell Hemoglobin Concentration : 32.2 gm/dL  Auto Neutrophil # : x  Auto Lymphocyte # : x  Auto Monocyte # : x  Auto Eosinophil # : x  Auto Basophil # : x  Auto Neutrophil % : x  Auto Lymphocyte % : x  Auto Monocyte % : x  Auto Eosinophil % : x  Auto Basophil % : x    12-14    136  |  101  |  14  ----------------------------<  95  3.9   |  25  |  0.55    Ca    9.6      14 Dec 2022 06:39            CAPILLARY BLOOD GLUCOSE          RADIOLOGY & ADDITIONAL TESTS:

## 2022-12-15 LAB
ANION GAP SERPL CALC-SCNC: 9 MMOL/L — SIGNIFICANT CHANGE UP (ref 5–17)
BUN SERPL-MCNC: 16 MG/DL — SIGNIFICANT CHANGE UP (ref 7–23)
CALCIUM SERPL-MCNC: 9 MG/DL — SIGNIFICANT CHANGE UP (ref 8.4–10.5)
CHLORIDE SERPL-SCNC: 101 MMOL/L — SIGNIFICANT CHANGE UP (ref 96–108)
CO2 SERPL-SCNC: 25 MMOL/L — SIGNIFICANT CHANGE UP (ref 22–31)
CREAT SERPL-MCNC: 0.56 MG/DL — SIGNIFICANT CHANGE UP (ref 0.5–1.3)
EGFR: 85 ML/MIN/1.73M2 — SIGNIFICANT CHANGE UP
GLUCOSE SERPL-MCNC: 176 MG/DL — HIGH (ref 70–99)
HCT VFR BLD CALC: 32.9 % — LOW (ref 34.5–45)
HGB BLD-MCNC: 10.6 G/DL — LOW (ref 11.5–15.5)
MCHC RBC-ENTMCNC: 30.4 PG — SIGNIFICANT CHANGE UP (ref 27–34)
MCHC RBC-ENTMCNC: 32.2 GM/DL — SIGNIFICANT CHANGE UP (ref 32–36)
MCV RBC AUTO: 94.3 FL — SIGNIFICANT CHANGE UP (ref 80–100)
NRBC # BLD: 0 /100 WBCS — SIGNIFICANT CHANGE UP (ref 0–0)
PLATELET # BLD AUTO: 450 K/UL — HIGH (ref 150–400)
POTASSIUM SERPL-MCNC: 3.9 MMOL/L — SIGNIFICANT CHANGE UP (ref 3.5–5.3)
POTASSIUM SERPL-SCNC: 3.9 MMOL/L — SIGNIFICANT CHANGE UP (ref 3.5–5.3)
RBC # BLD: 3.49 M/UL — LOW (ref 3.8–5.2)
RBC # FLD: 14.6 % — HIGH (ref 10.3–14.5)
SARS-COV-2 RNA SPEC QL NAA+PROBE: SIGNIFICANT CHANGE UP
SODIUM SERPL-SCNC: 135 MMOL/L — SIGNIFICANT CHANGE UP (ref 135–145)
WBC # BLD: 12.03 K/UL — HIGH (ref 3.8–10.5)
WBC # FLD AUTO: 12.03 K/UL — HIGH (ref 3.8–10.5)

## 2022-12-15 RX ADMIN — LISINOPRIL 20 MILLIGRAM(S): 2.5 TABLET ORAL at 06:32

## 2022-12-15 RX ADMIN — Medication 975 MILLIGRAM(S): at 06:32

## 2022-12-15 RX ADMIN — Medication 975 MILLIGRAM(S): at 14:14

## 2022-12-15 RX ADMIN — QUETIAPINE FUMARATE 12.5 MILLIGRAM(S): 200 TABLET, FILM COATED ORAL at 21:39

## 2022-12-15 RX ADMIN — ATORVASTATIN CALCIUM 10 MILLIGRAM(S): 80 TABLET, FILM COATED ORAL at 21:38

## 2022-12-15 RX ADMIN — Medication 975 MILLIGRAM(S): at 21:38

## 2022-12-15 RX ADMIN — CHLORHEXIDINE GLUCONATE 1 APPLICATION(S): 213 SOLUTION TOPICAL at 06:40

## 2022-12-15 RX ADMIN — APIXABAN 2.5 MILLIGRAM(S): 2.5 TABLET, FILM COATED ORAL at 18:50

## 2022-12-15 RX ADMIN — Medication 100 MILLIGRAM(S): at 06:32

## 2022-12-15 RX ADMIN — Medication 975 MILLIGRAM(S): at 13:44

## 2022-12-15 RX ADMIN — CEFTRIAXONE 100 MILLIGRAM(S): 500 INJECTION, POWDER, FOR SOLUTION INTRAMUSCULAR; INTRAVENOUS at 19:02

## 2022-12-15 RX ADMIN — APIXABAN 2.5 MILLIGRAM(S): 2.5 TABLET, FILM COATED ORAL at 06:32

## 2022-12-15 RX ADMIN — Medication 975 MILLIGRAM(S): at 07:08

## 2022-12-15 RX ADMIN — SENNA PLUS 2 TABLET(S): 8.6 TABLET ORAL at 21:38

## 2022-12-15 NOTE — PROGRESS NOTE ADULT - SUBJECTIVE AND OBJECTIVE BOX
89 yo female presenting after fall. pt states door was partially opened and tripped and fell onto her left side. afterward complained of pain to left leg and hip. Unable to ambulate since. No h/o prior injuries to leg. Denies any head trauma. no n/v. no chest pain or sob. is not on any blood thinners. Did not take anything for pain prior to arrival. Patient was brought to [facility] for further evaluation and treatment. In the ED she was found to have a left hip fracture and is s/p an Open reduction and internal fixation of the left hip. Patient seen now resting comfortably. appears pain is well controlled. Patient remains confused. Patient remains in alternating afib and NSR. Pt has remained in NSR. Patient found to have Urinary tract infection       MEDICATIONS  (STANDING):  acetaminophen     Tablet .. 975 milliGRAM(s) Oral every 8 hours  apixaban 2.5 milliGRAM(s) Oral every 12 hours  atorvastatin 10 milliGRAM(s) Oral at bedtime  cefTRIAXone   IVPB 1000 milliGRAM(s) IV Intermittent every 24 hours  chlorhexidine 2% Cloths 1 Application(s) Topical <User Schedule>  influenza  Vaccine (HIGH DOSE) 0.7 milliLiter(s) IntraMuscular once  lisinopril 20 milliGRAM(s) Oral daily  metoprolol succinate  milliGRAM(s) Oral daily  polyethylene glycol 3350 17 Gram(s) Oral daily  QUEtiapine 12.5 milliGRAM(s) Oral at bedtime  senna 2 Tablet(s) Oral at bedtime  sodium chloride 0.9%. 1000 milliLiter(s) (125 mL/Hr) IV Continuous <Continuous>    MEDICATIONS  (PRN):  bisacodyl Suppository 10 milliGRAM(s) Rectal daily PRN If no bowel movement  magnesium hydroxide Suspension 30 milliLiter(s) Oral daily PRN Constipation  melatonin 3 milliGRAM(s) Oral at bedtime PRN Insomnia  QUEtiapine 12.5 milliGRAM(s) Oral daily PRN agitation          VITALS:   T(C): 36.7 (12-15-22 @ 05:30), Max: 36.7 (12-15-22 @ 05:30)  HR: 70 (12-15-22 @ 05:30) (56 - 73)  BP: 160/71 (12-15-22 @ 05:30) (116/60 - 160/71)  RR: 18 (12-15-22 @ 05:30) (18 - 18)  SpO2: 96% (12-15-22 @ 05:30) (94% - 96%)  Wt(kg): --    PHYSICAL EXAM:  GENERAL: NAD, well-groomed, well-developed  HEAD:  Atraumatic, Normocephalic  EYES: EOMI, PERRLA, conjunctiva and sclera clear  ENMT: No tonsillar erythema, exudates, or enlargement; Moist mucous membranes, Good dentition, No lesions  NECK: Supple, No JVD, Normal thyroid  NERVOUS SYSTEM:  Alert & Oriented X1,  Motor Strength 5/5 B/L upper and lower extremities; DTRs 2+ intact and symmetric  CHEST/LUNG: Clear to percussion bilaterally; No rales, rhonchi, wheezing, or rubs  HEART: Regular rate and rhythm; No murmurs, rubs, or gallops  ABDOMEN: Soft, Nontender, Nondistended; Bowel sounds present  EXTREMITIES:  2+ Peripheral Pulses, No clubbing, cyanosis, or edema  LYMPH: No lymphadenopathy noted      LABS:        CBC Full  -  ( 14 Dec 2022 06:39 )  WBC Count : 8.37 K/uL  RBC Count : 3.54 M/uL  Hemoglobin : 10.8 g/dL  Hematocrit : 33.5 %  Platelet Count - Automated : 460 K/uL  Mean Cell Volume : 94.6 fl  Mean Cell Hemoglobin : 30.5 pg  Mean Cell Hemoglobin Concentration : 32.2 gm/dL  Auto Neutrophil # : x  Auto Lymphocyte # : x  Auto Monocyte # : x  Auto Eosinophil # : x  Auto Basophil # : x  Auto Neutrophil % : x  Auto Lymphocyte % : x  Auto Monocyte % : x  Auto Eosinophil % : x  Auto Basophil % : x    12-14    136  |  101  |  14  ----------------------------<  95  3.9   |  25  |  0.55    Ca    9.6      14 Dec 2022 06:39          Urinalysis Basic - ( 14 Dec 2022 15:43 )    Color: Yellow / Appearance: Slightly Turbid / S.019 / pH: x  Gluc: x / Ketone: Negative  / Bili: Negative / Urobili: 2 mg/dL   Blood: x / Protein: 30 mg/dL / Nitrite: Positive   Leuk Esterase: Large / RBC: 40-45 /hpf / WBC >50 /HPF   Sq Epi: x / Non Sq Epi: 0 /hpf / Bacteria: Many      CAPILLARY BLOOD GLUCOSE          RADIOLOGY & ADDITIONAL TESTS:

## 2022-12-15 NOTE — PROGRESS NOTE ADULT - SUBJECTIVE AND OBJECTIVE BOX
POD11 s/p L Hip IMN    Pt seen and examined. Doing well. Pain appears controlled. No acute overnight complaints or events per nursing.    Vital Signs Last 24 Hrs  T(C): 36.3 (14 Dec 2022 21:13), Max: 36.6 (14 Dec 2022 05:43)  T(F): 97.4 (14 Dec 2022 21:13), Max: 97.8 (14 Dec 2022 05:43)  HR: 73 (14 Dec 2022 21:13) (56 - 73)  BP: 138/64 (14 Dec 2022 21:13) (116/60 - 159/78)  BP(mean): --  RR: 18 (14 Dec 2022 21:13) (18 - 18)  SpO2: 94% (14 Dec 2022 21:13) (94% - 97%)    Parameters below as of 14 Dec 2022 21:13  Patient On (Oxygen Delivery Method): room air                          10.8   8.37  )-----------( 460      ( 14 Dec 2022 06:39 )             33.5         Physical Exam:  Gen: NAD  LLE:  Dressings CDI  +EHL/FHL/TA/GSC  SILT  Toes warm, Cap refill brisk/warm and perfused, +DP pulse   Compartments soft/non-tender  No calf TTP bilaterally        89y Female s/p L IMN POD11    Plan:     Medical comanagement appreciated  -Rocephin ordered for UTI, UCx pending  -DVT ppx: Melanie 2.5 BID   -Out of bed, ambulate, weight bearing as tolerated  -Seroquel as needed for agitation    FU AM Labs  -dispo to [facility] with beard; Ortho stable for discharge when authorization/bed available  -Will discuss with attending Dr. Blair and advise if any changes to plan

## 2022-12-16 ENCOUNTER — TRANSCRIPTION ENCOUNTER (OUTPATIENT)
Age: 87
End: 2022-12-16

## 2022-12-16 VITALS
OXYGEN SATURATION: 97 % | DIASTOLIC BLOOD PRESSURE: 67 MMHG | HEART RATE: 66 BPM | TEMPERATURE: 98 F | SYSTOLIC BLOOD PRESSURE: 132 MMHG | RESPIRATION RATE: 18 BRPM

## 2022-12-16 LAB
ANION GAP SERPL CALC-SCNC: 10 MMOL/L — SIGNIFICANT CHANGE UP (ref 5–17)
BUN SERPL-MCNC: 13 MG/DL — SIGNIFICANT CHANGE UP (ref 7–23)
CALCIUM SERPL-MCNC: 9.1 MG/DL — SIGNIFICANT CHANGE UP (ref 8.4–10.5)
CHLORIDE SERPL-SCNC: 102 MMOL/L — SIGNIFICANT CHANGE UP (ref 96–108)
CO2 SERPL-SCNC: 25 MMOL/L — SIGNIFICANT CHANGE UP (ref 22–31)
CREAT SERPL-MCNC: 0.58 MG/DL — SIGNIFICANT CHANGE UP (ref 0.5–1.3)
EGFR: 84 ML/MIN/1.73M2 — SIGNIFICANT CHANGE UP
GLUCOSE SERPL-MCNC: 96 MG/DL — SIGNIFICANT CHANGE UP (ref 70–99)
HCT VFR BLD CALC: 32.4 % — LOW (ref 34.5–45)
HGB BLD-MCNC: 10.4 G/DL — LOW (ref 11.5–15.5)
MCHC RBC-ENTMCNC: 30.7 PG — SIGNIFICANT CHANGE UP (ref 27–34)
MCHC RBC-ENTMCNC: 32.1 GM/DL — SIGNIFICANT CHANGE UP (ref 32–36)
MCV RBC AUTO: 95.6 FL — SIGNIFICANT CHANGE UP (ref 80–100)
NRBC # BLD: 0 /100 WBCS — SIGNIFICANT CHANGE UP (ref 0–0)
PLATELET # BLD AUTO: 433 K/UL — HIGH (ref 150–400)
POTASSIUM SERPL-MCNC: 3.9 MMOL/L — SIGNIFICANT CHANGE UP (ref 3.5–5.3)
POTASSIUM SERPL-SCNC: 3.9 MMOL/L — SIGNIFICANT CHANGE UP (ref 3.5–5.3)
RBC # BLD: 3.39 M/UL — LOW (ref 3.8–5.2)
RBC # FLD: 14.7 % — HIGH (ref 10.3–14.5)
SODIUM SERPL-SCNC: 137 MMOL/L — SIGNIFICANT CHANGE UP (ref 135–145)
WBC # BLD: 12.18 K/UL — HIGH (ref 3.8–10.5)
WBC # FLD AUTO: 12.18 K/UL — HIGH (ref 3.8–10.5)

## 2022-12-16 RX ORDER — APIXABAN 2.5 MG/1
1 TABLET, FILM COATED ORAL
Qty: 0 | Refills: 0 | DISCHARGE
Start: 2022-12-16

## 2022-12-16 RX ORDER — CIPROFLOXACIN LACTATE 400MG/40ML
1 VIAL (ML) INTRAVENOUS
Qty: 0 | Refills: 0 | DISCHARGE
Start: 2022-12-16

## 2022-12-16 RX ORDER — CIPROFLOXACIN LACTATE 400MG/40ML
250 VIAL (ML) INTRAVENOUS
Refills: 0 | Status: DISCONTINUED | OUTPATIENT
Start: 2022-12-16 | End: 2022-12-16

## 2022-12-16 RX ADMIN — APIXABAN 2.5 MILLIGRAM(S): 2.5 TABLET, FILM COATED ORAL at 06:01

## 2022-12-16 RX ADMIN — Medication 975 MILLIGRAM(S): at 06:00

## 2022-12-16 RX ADMIN — Medication 975 MILLIGRAM(S): at 06:50

## 2022-12-16 RX ADMIN — LISINOPRIL 20 MILLIGRAM(S): 2.5 TABLET ORAL at 06:01

## 2022-12-16 RX ADMIN — CHLORHEXIDINE GLUCONATE 1 APPLICATION(S): 213 SOLUTION TOPICAL at 06:00

## 2022-12-16 RX ADMIN — Medication 100 MILLIGRAM(S): at 06:01

## 2022-12-16 RX ADMIN — Medication 975 MILLIGRAM(S): at 04:45

## 2022-12-16 NOTE — PROGRESS NOTE ADULT - PROBLEM SELECTOR PLAN 1
Patient s/p an Open reduction and internal fixation of the left hip  tolerated the procedure well  PO as tolerated  DVT and GI prophylaxis.  physical therapy as tolerated
Patient s/p an Open reduction and internal fixation of the left hip  tolerated the procedure well  PO as tolerated  DVT and GI prophylaxis.  physical therapy as tolerated
Patient found to be in new onset afib  started on lopressor for rate control  moved to tele for further monitoring  will need to start AC  cardiology following  echo ordered
Patient found to be in new onset afib  continue Toprol 100 mg QD  Has been rate controlled   continue eliquis   as per cardiology push lopressor as needed
Patient found to be in new onset afib  Patient on Toprol 75 daily will increase it to 100 with continue episodes of afib  moved to tele for further monitoring  started on eliquis   cardiology following  echo as above
Patient found to be in new onset afib  continue Toprol 100 mg QD  Has been rate controlled   started on eliquis   cardiology following
Patient found to be in new onset afib  continue Toprol 100 mg QD  Has been rate controlled   started on eliquis   cardiology following
Patient found to be in new onset afib  continue Toprol 100 mg QD  Has been rate controlled   continue eliquis
Patient found to be in new onset afib  continue Toprol 100 mg QD  Has been rate controlled   continue eliquis
Patient s/p an Open reduction and internal fixation of the left hip  tolerated the procedure well  PO as tolerated  DVT and GI prophylaxis.  physical therapy as tolerated
Patient found to be in new onset afib  continue Toprol 100 mg QD  Has been rate controlled   started on eliquis   as per cardiology push lopressor as needed for a rate of
Patient found to be in new onset afib  continue Toprol 100 mg QD  Has been rate controlled   continue eliquis

## 2022-12-16 NOTE — PROGRESS NOTE ADULT - PROBLEM SELECTOR PLAN 4
continue metoprolol and lisinopril  will continue to monitor BP  BP has been controlled   Will adjust meds as needed  Low sodium diet.
continue metoprolol and lisinopril  will continue to monitor BP  BP has been controlled   Will adjust meds as needed  Low sodium diet.
Pain meds as needed  follow for oversedation  GI regime to prevent constipation.
continue metoprolol and lisinopril  will continue to monitor BP  BP has been controlled   Will adjust meds as needed  Low sodium diet.
Pain meds as needed  follow for oversedation  GI regime to prevent constipation.
continue metoprolol and lisinopril  will continue to monitor BP  BP has been controlled   Will adjust meds as needed  Low sodium diet.
Pain meds as needed  follow for oversedation  GI regime to prevent constipation.
continue metoprolol and lisinopril  will continue to monitor BP  BP has been controlled   Will adjust meds as needed  Low sodium diet.

## 2022-12-16 NOTE — PROGRESS NOTE ADULT - PROBLEM SELECTOR PROBLEM 1
Hip fracture, left
Atrial fibrillation
Hip fracture, left
Atrial fibrillation
Hip fracture, left
Atrial fibrillation
Atrial fibrillation

## 2022-12-16 NOTE — PROGRESS NOTE ADULT - PROVIDER SPECIALTY LIST ADULT
Orthopedics
Cardiology
Orthopedics
Cardiology
Orthopedics
Internal Medicine

## 2022-12-16 NOTE — PROGRESS NOTE ADULT - ASSESSMENT
Electrophysiology  Outpatient Progress Note       Reason for visit:   Chief Complaint   Patient presents with   • Follow-up   3/27/2019      HPI  Andressa Baker is a 37 y.o. female who is seen today for follow-up.  She has a history of symptomatic premature ventricular depolarization beats. 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 The procedure was performed by my partner Dr. Patrick Manzanares at [facility]. Her post ablation course was complicated by pericarditis which resolved.  Had been doing much better and able to exercise after the ablation procedure.  Her VPD's worsened during early pregnancy, however her symptoms are now improving significantly as her pregnancy progresses.  She does get some dizziness related to low blood pressure.  She has no presyncope or syncope.  She denies chest pain or shortness of breath.  She is currently 26 weeks pregnant and plans for a vaginal delivery at [facility].  Her due date is 2019.    Past Medical History:   Diagnosis Date   • Abnormal ECG     PVCs   • Arrhythmia 2018    PVCs, status post ablation, partially successful.   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 Her  is also a physician, oncologist. She has 4 children and is pregnant with her fifth.    Family History   Problem Relation Age of Onset   • Hyperlipidemia Mother    •  Hyperlipidemia Father    • Hypertension Father    • Prostate cancer Father 78   • Dementia Father    • Hyperlipidemia Maternal Grandmother    • Clotting disorder Maternal Grandfather         developed in 60's - required tansfusions/platelets     ALLERGY:  No known allergies    MEDICATIONS:   Current Outpatient Prescriptions:   •  docosahexanoic acid (PRENATAL DHA) 200 mg capsule, Prenatal + DHA, Disp: , Rfl:   •  doxylamine-pyridoxine, vit B6, (BONJESTA) 20-20 mg tablet,IR,delayed rel,biphasic, Take 1 tablet by mouth 2 (two) times a day., Disp: 30 tablet, Rfl: 3  •  ondansetron (ZOFRAN) 4 mg tablet, TAKE 1 TABLET BY MOUTH EVERY 12 HOURS AS NEEDED FOR NAUSEA. INSUR MAX 18 TAB/21 DAYS, Disp: , Rfl: 5    Review of Systems   Cardiovascular: Positive for palpitations.   All other systems reviewed and are negative.  As above in HPI.  Intrauterine pregnancy 26 weeks.    Objective   Vitals:    03/27/19 1324   BP: 100/70   Pulse: 86   Resp: 18   SpO2: 96%   Weight: 93.3 kg (205 lb 9.6 oz)     Physical Exam   Constitutional: She is oriented to person, place, and time. She appears well-developed and well-nourished. No distress.   HENT:   Head: Normocephalic.   Eyes: Conjunctivae and EOM are normal.   Neck: Normal range of motion.   Cardiovascular: Normal rate, regular rhythm and normal heart sounds.  Exam reveals no gallop and no friction rub.    No murmur heard.  Pulmonary/Chest: Effort normal and breath sounds normal. No respiratory distress. She has no wheezes. She has no rales.   Abdominal: Soft. Bowel sounds are normal.   Musculoskeletal: Normal range of motion. She exhibits no edema.   Neurological: She is alert and oriented to person, place, and time.   Skin: Skin is warm and dry.   Psychiatric: She has a normal mood and affect. Her behavior is normal. Judgment and thought content normal.   Abdomen/Pelvis: Intrauterine pregnancy    EKG: I personally reviewed the 12 lead EKG obtained in the office today which reveals  Sinus rhythm rate 89 bpm    Lab Results   Component Value Date    HGB 12.1 2019     2019    WBC 5.74 2019    ALT 12 2018    AST 12 2018     2018    BUN 14 2018    CREATININE 0.80 2018    K 4.0 2018    GLUCOSE NEGATIVE 2018    TSH 0.74 2018    CHOL 196 2016    HDL 67 2016    TRIG 58 2016    INR 1.0 2017    MG 2.2 2018     ASSESSMENT/PLAN:  1. Palpitations and PVC's. 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  Her symptoms of palpitations have improved with the progression of her pregnancy.  Overall, she is feeling well.  She has not required beta-blocker therapy. This is a benign arrhythmia.     2. Intrauterine pregnancy:  I explained to the patient that there is no cardiovascular contraindication to epidural anesthesia,  vaginal delivery or  section if needed. She will be delivering at [facility] with a due date of 2019.    Please feels free to contact me or one of my electrophysiology partners if I am not availble for any questions at the time of labor and delivery.    She will see me  follow up in the office 6 months or as needed.        Thank you for allowing me to participate in the care of your patient.  Please do not hesitate to contact me if you have any questions regarding my recommendations and management.    Sincerely;    Leslie Boyce MD [facility]      Mao BOLANOS PA-C, am scribing for, and in the presence of, Lselie Boyce MD.    ILeslie MD, personally performed the services described in this documentation as described by Mao Lee in my presence, and it is both accurate and complete. I have seen and examined the patient.  I have reviewed the PA note and supporting documentation. The note has been amended as appropriate.   89 YO woman with a hx of HTN, HLD, Dementia, macular degeneration, Presents after a fall at home with a left hip fracture. Patient is s/p an Open reduction and internal fixation of the left hip

## 2022-12-16 NOTE — PROGRESS NOTE ADULT - PROBLEM SELECTOR PLAN 5
Pain meds as needed  follow for oversedation  GI regime to prevent constipation.

## 2022-12-16 NOTE — PROGRESS NOTE ADULT - PROBLEM SELECTOR PLAN 6
Patient with cloudy urine  will send off a UA and culture as needed
Patient with cloudy urine  UA was positive  started on ceftriaxone  switch to cipro for 5 days
Patient with cloudy urine  UA was positive  started on ceftriaxone  cultures pending

## 2022-12-16 NOTE — PROGRESS NOTE ADULT - NUTRITIONAL ASSESSMENT
This patient has been assessed with a concern for Malnutrition and has been determined to have a diagnosis/diagnoses of Severe protein-calorie malnutrition and Underweight (BMI < 19).    This patient is being managed with:   Diet Regular-  Supplement Feeding Modality:  Oral  Ensure Enlive Cans or Servings Per Day:  2       Frequency:  Daily  Entered: Dec  7 2022 11:41AM    

## 2022-12-16 NOTE — PROGRESS NOTE ADULT - PROBLEM SELECTOR PLAN 2
Patient s/p an Open reduction and internal fixation of the left hip  tolerated the procedure well  PO as tolerated  DVT and GI prophylaxis.  physical therapy as tolerated
continue to reorient Patient as needed  Seroquel as needed for agitation  Psych eval as needed.
PO as tolerated  DVT and GI prophylaxis.  continue physical therapy
PO as tolerated  DVT and GI prophylaxis.  continue physical therapy
Patient s/p an Open reduction and internal fixation of the left hip  tolerated the procedure well  PO as tolerated  DVT and GI prophylaxis.  physical therapy as tolerated
continue to reorient Patient as needed  Seroquel as needed for agitation  Psych eval as needed.
Patient s/p an Open reduction and internal fixation of the left hip  tolerated the procedure well  PO as tolerated  DVT and GI prophylaxis.  physical therapy as tolerated
PO as tolerated  DVT and GI prophylaxis.  continue physical therapy
PO as tolerated  DVT and GI prophylaxis.  continue physical therapy
Patient s/p an Open reduction and internal fixation of the left hip  tolerated the procedure well  PO as tolerated  DVT and GI prophylaxis.  physical therapy as tolerated  repeat xrays pending after fall
continue to reorient Patient as needed  Seroquel as needed for agitation  Psych eval as needed.  Patient has been calm
PO as tolerated  DVT and GI prophylaxis.  continue physical therapy

## 2022-12-16 NOTE — PROGRESS NOTE ADULT - TIME BILLING
Discussed treatment plan with daughters and patient at bedside.  DC planning to rehab   I am a non participating [employer] physician seeing Pt in coverage for Dr Villagomez. The above patient documentation by Dr. Pritchett was reviewed and I agree with his evaluation, assessment and treatment plan.  Matthew Villagomez M.D.

## 2022-12-16 NOTE — PROGRESS NOTE ADULT - PROBLEM SELECTOR PROBLEM 4
Hypertension
Pain
Pain
Hypertension
Hypertension
Pain
Hypertension

## 2022-12-16 NOTE — PROGRESS NOTE ADULT - PROBLEM SELECTOR PLAN 3
continue metoprolol and lisinopril  will continue to monitor BP  BP has been controlled   Will adjust meds as needed  Low sodium diet.
continue to reorient Patient as needed  Seroquel as needed for agitation  Psych eval as needed.  Patient has been calm
continue metoprolol and lisinopril  will continue to monitor BP  Will adjust meds as needed  Low sodium diet.
continue to reorient Patient as needed  Seroquel as needed for agitation  Psych eval as needed.  Patient has been calm
continue to reorient Patient as needed  Seroquel as needed for agitation  Psych eval as needed.  Patient has been calm
continue to reorient Patient as needed  Seroquel as needed for agitation. hold for oversedation   Psych eval as needed.  Patient has been calm
continue to reorient Patient as needed  Seroquel as needed for agitation. hold for oversedation   Psych eval as needed.  Patient has been calm
DISCHARGE
continue to reorient Patient as needed  Seroquel as needed for agitation  Psych eval as needed.  Patient has been calm
continue to reorient Patient as needed  Seroquel as needed for agitation. hold for oversedation   Psych eval as needed.  Patient has been calm
continue metoprolol and lisinopril  will continue to monitor BP  BP has been controlled   Will adjust meds as needed  Low sodium diet.
continue to reorient Patient as needed  Seroquel as needed for agitation. hold for oversedation   Psych eval as needed.  Patient has been calm
continue to reorient Patient as needed  Seroquel as needed for agitation  Psych eval as needed.  Patient has been calm

## 2022-12-16 NOTE — PROGRESS NOTE ADULT - PROBLEM SELECTOR PROBLEM 2
Hip fracture, left
Dementia
Dementia
Hip fracture, left
Dementia
Hip fracture, left

## 2022-12-16 NOTE — PROGRESS NOTE ADULT - SUBJECTIVE AND OBJECTIVE BOX
Pt seen/examined. Doing well. Pain controlled. No acute overnight complaints or events.    T(C): 36.5 (12-15-22 @ 20:39), Max: 36.7 (12-15-22 @ 05:30)  HR: 77 (12-15-22 @ 20:39) (70 - 77)  BP: 134/73 (12-15-22 @ 20:39) (119/65 - 160/71)  RR: 18 (12-15-22 @ 20:39) (18 - 18)  SpO2: 96% (12-15-22 @ 20:39) (95% - 96%)  Wt(kg): --  - Gen: NAD    Physical Exam:  Gen: NAD  LLE:  Dressings CDI  +EHL/FHL/TA/GSC  SILT  Toes warm, Cap refill brisk/warm and perfused, +DP pulse   Compartments soft/non-tender  No calf TTP bilaterally    89y Female s/p L IMN POD11    Plan:     Medical comanagement appreciated  -Rocephin ordered for UTI, UCx pending  -DVT ppx: Melanie 2.5 BID   -Out of bed, ambulate, weight bearing as tolerated  -Seroquel as needed for agitation    FU AM Labs  -dispo to [facility] with beard; Ortho stable for discharge when authorization/bed available  -Will discuss with attending Dr. Blair and advise if any changes to plan

## 2022-12-16 NOTE — PROGRESS NOTE ADULT - PROBLEM SELECTOR PROBLEM 3
Dementia
Dementia
Hypertension
Dementia
Hypertension
Dementia
Dementia
Hypertension
Dementia

## 2022-12-16 NOTE — PROGRESS NOTE ADULT - SUBJECTIVE AND OBJECTIVE BOX
89 yo female presenting after fall. pt states door was partially opened and tripped and fell onto her left side. afterward complained of pain to left leg and hip. Unable to ambulate since. No h/o prior injuries to leg. Denies any head trauma. no n/v. no chest pain or sob. is not on any blood thinners. Did not take anything for pain prior to arrival. Patient was brought to [facility] for further evaluation and treatment. In the ED she was found to have a left hip fracture and is s/p an Open reduction and internal fixation of the left hip. Patient seen now resting comfortably. appears pain is well controlled. Patient remains confused. Patient remains in alternating afib and NSR. Pt has remained in NSR. Patient found to have Urinary tract infection       MEDICATIONS  (STANDING):  acetaminophen     Tablet .. 975 milliGRAM(s) Oral every 8 hours  apixaban 2.5 milliGRAM(s) Oral every 12 hours  atorvastatin 10 milliGRAM(s) Oral at bedtime  chlorhexidine 2% Cloths 1 Application(s) Topical <User Schedule>  ciprofloxacin     Tablet 250 milliGRAM(s) Oral two times a day  influenza  Vaccine (HIGH DOSE) 0.7 milliLiter(s) IntraMuscular once  lisinopril 20 milliGRAM(s) Oral daily  metoprolol succinate  milliGRAM(s) Oral daily  polyethylene glycol 3350 17 Gram(s) Oral daily  QUEtiapine 12.5 milliGRAM(s) Oral at bedtime  senna 2 Tablet(s) Oral at bedtime  sodium chloride 0.9%. 1000 milliLiter(s) (125 mL/Hr) IV Continuous <Continuous>    MEDICATIONS  (PRN):  bisacodyl Suppository 10 milliGRAM(s) Rectal daily PRN If no bowel movement  magnesium hydroxide Suspension 30 milliLiter(s) Oral daily PRN Constipation  melatonin 3 milliGRAM(s) Oral at bedtime PRN Insomnia  QUEtiapine 12.5 milliGRAM(s) Oral daily PRN agitation          VITALS:   T(C): 36.6 (22 @ 12:08), Max: 36.7 (22 @ 05:56)  HR: 66 (22 @ 12:08) (66 - 77)  BP: 132/67 (22 @ 12:08) (132/67 - 143/80)  RR: 18 (22 @ 12:08) (18 - 18)  SpO2: 97% (22 @ 12:08) (95% - 97%)  Wt(kg): --    PHYSICAL EXAM:  GENERAL: NAD, well-groomed, well-developed  HEAD:  Atraumatic, Normocephalic  EYES: EOMI, PERRLA, conjunctiva and sclera clear  ENMT: No tonsillar erythema, exudates, or enlargement; Moist mucous membranes, Good dentition, No lesions  NECK: Supple, No JVD, Normal thyroid  NERVOUS SYSTEM:  Alert & Oriented X1,  Motor Strength 5/5 B/L upper and lower extremities; DTRs 2+ intact and symmetric  CHEST/LUNG: Clear to percussion bilaterally; No rales, rhonchi, wheezing, or rubs  HEART: Regular rate and rhythm; No murmurs, rubs, or gallops  ABDOMEN: Soft, Nontender, Nondistended; Bowel sounds present  EXTREMITIES:  2+ Peripheral Pulses, No clubbing, cyanosis, or edema  LYMPH: No lymphadenopathy noted    LABS:        CBC Full  -  ( 16 Dec 2022 07:05 )  WBC Count : 12.18 K/uL  RBC Count : 3.39 M/uL  Hemoglobin : 10.4 g/dL  Hematocrit : 32.4 %  Platelet Count - Automated : 433 K/uL  Mean Cell Volume : 95.6 fl  Mean Cell Hemoglobin : 30.7 pg  Mean Cell Hemoglobin Concentration : 32.1 gm/dL  Auto Neutrophil # : x  Auto Lymphocyte # : x  Auto Monocyte # : x  Auto Eosinophil # : x  Auto Basophil # : x  Auto Neutrophil % : x  Auto Lymphocyte % : x  Auto Monocyte % : x  Auto Eosinophil % : x  Auto Basophil % : x    12-16    137  |  102  |  13  ----------------------------<  96  3.9   |  25  |  0.58    Ca    9.1      16 Dec 2022 07:05          Urinalysis Basic - ( 14 Dec 2022 15:43 )    Color: Yellow / Appearance: Slightly Turbid / S.019 / pH: x  Gluc: x / Ketone: Negative  / Bili: Negative / Urobili: 2 mg/dL   Blood: x / Protein: 30 mg/dL / Nitrite: Positive   Leuk Esterase: Large / RBC: 40-45 /hpf / WBC >50 /HPF   Sq Epi: x / Non Sq Epi: 0 /hpf / Bacteria: Many      CAPILLARY BLOOD GLUCOSE          RADIOLOGY & ADDITIONAL TESTS:

## 2022-12-16 NOTE — DISCHARGE NOTE NURSING/CASE MANAGEMENT/SOCIAL WORK - PATIENT PORTAL LINK FT
You can access the FollowMyHealth Patient Portal offered by [facility] by registering at the following website: [email or web address]. By joining [employer]’s FollowMyHealth portal, you will also be able to view your health information using other applications (apps) compatible with our system.

## 2022-12-18 LAB
-  AMIKACIN: SIGNIFICANT CHANGE UP
-  AMOXICILLIN/CLAVULANIC ACID: SIGNIFICANT CHANGE UP
-  AMPICILLIN/SULBACTAM: SIGNIFICANT CHANGE UP
-  AMPICILLIN: SIGNIFICANT CHANGE UP
-  AZTREONAM: SIGNIFICANT CHANGE UP
-  CEFAZOLIN: SIGNIFICANT CHANGE UP
-  CEFEPIME: SIGNIFICANT CHANGE UP
-  CEFTRIAXONE: SIGNIFICANT CHANGE UP
-  CIPROFLOXACIN: SIGNIFICANT CHANGE UP
-  ERTAPENEM: SIGNIFICANT CHANGE UP
-  GENTAMICIN: SIGNIFICANT CHANGE UP
-  IMIPENEM: SIGNIFICANT CHANGE UP
-  LEVOFLOXACIN: SIGNIFICANT CHANGE UP
-  MEROPENEM: SIGNIFICANT CHANGE UP
-  NITROFURANTOIN: SIGNIFICANT CHANGE UP
-  PIPERACILLIN/TAZOBACTAM: SIGNIFICANT CHANGE UP
-  TOBRAMYCIN: SIGNIFICANT CHANGE UP
-  TRIMETHOPRIM/SULFAMETHOXAZOLE: SIGNIFICANT CHANGE UP
CULTURE RESULTS: SIGNIFICANT CHANGE UP
METHOD TYPE: SIGNIFICANT CHANGE UP
ORGANISM # SPEC MICROSCOPIC CNT: SIGNIFICANT CHANGE UP
ORGANISM # SPEC MICROSCOPIC CNT: SIGNIFICANT CHANGE UP
SPECIMEN SOURCE: SIGNIFICANT CHANGE UP

## 2023-01-04 PROCEDURE — 71045 X-RAY EXAM CHEST 1 VIEW: CPT

## 2023-01-04 PROCEDURE — 96375 TX/PRO/DX INJ NEW DRUG ADDON: CPT

## 2023-01-04 PROCEDURE — 36415 COLL VENOUS BLD VENIPUNCTURE: CPT

## 2023-01-04 PROCEDURE — 73551 X-RAY EXAM OF FEMUR 1: CPT

## 2023-01-04 PROCEDURE — 85027 COMPLETE CBC AUTOMATED: CPT

## 2023-01-04 PROCEDURE — 83036 HEMOGLOBIN GLYCOSYLATED A1C: CPT

## 2023-01-04 PROCEDURE — 86850 RBC ANTIBODY SCREEN: CPT

## 2023-01-04 PROCEDURE — 93005 ELECTROCARDIOGRAM TRACING: CPT

## 2023-01-04 PROCEDURE — 87641 MR-STAPH DNA AMP PROBE: CPT

## 2023-01-04 PROCEDURE — 72125 CT NECK SPINE W/O DYE: CPT

## 2023-01-04 PROCEDURE — 87086 URINE CULTURE/COLONY COUNT: CPT

## 2023-01-04 PROCEDURE — 80053 COMPREHEN METABOLIC PANEL: CPT

## 2023-01-04 PROCEDURE — 80061 LIPID PANEL: CPT

## 2023-01-04 PROCEDURE — C1713: CPT

## 2023-01-04 PROCEDURE — 72128 CT CHEST SPINE W/O DYE: CPT

## 2023-01-04 PROCEDURE — 99284 EMERGENCY DEPT VISIT MOD MDM: CPT

## 2023-01-04 PROCEDURE — 73502 X-RAY EXAM HIP UNI 2-3 VIEWS: CPT

## 2023-01-04 PROCEDURE — P9016: CPT

## 2023-01-04 PROCEDURE — 97110 THERAPEUTIC EXERCISES: CPT

## 2023-01-04 PROCEDURE — 83735 ASSAY OF MAGNESIUM: CPT

## 2023-01-04 PROCEDURE — C1769: CPT

## 2023-01-04 PROCEDURE — P9011: CPT

## 2023-01-04 PROCEDURE — 86985 SPLIT BLOOD OR PRODUCTS: CPT

## 2023-01-04 PROCEDURE — 97116 GAIT TRAINING THERAPY: CPT

## 2023-01-04 PROCEDURE — 85610 PROTHROMBIN TIME: CPT

## 2023-01-04 PROCEDURE — 80048 BASIC METABOLIC PNL TOTAL CA: CPT

## 2023-01-04 PROCEDURE — U0003: CPT

## 2023-01-04 PROCEDURE — C8929: CPT

## 2023-01-04 PROCEDURE — 76000 FLUOROSCOPY <1 HR PHYS/QHP: CPT

## 2023-01-04 PROCEDURE — 97530 THERAPEUTIC ACTIVITIES: CPT

## 2023-01-04 PROCEDURE — 86901 BLOOD TYPING SEROLOGIC RH(D): CPT

## 2023-01-04 PROCEDURE — 86923 COMPATIBILITY TEST ELECTRIC: CPT

## 2023-01-04 PROCEDURE — 73120 X-RAY EXAM OF HAND: CPT

## 2023-01-04 PROCEDURE — 36430 TRANSFUSION BLD/BLD COMPNT: CPT

## 2023-01-04 PROCEDURE — 97161 PT EVAL LOW COMPLEX 20 MIN: CPT

## 2023-01-04 PROCEDURE — 73552 X-RAY EXAM OF FEMUR 2/>: CPT

## 2023-01-04 PROCEDURE — 97165 OT EVAL LOW COMPLEX 30 MIN: CPT

## 2023-01-04 PROCEDURE — 96374 THER/PROPH/DIAG INJ IV PUSH: CPT

## 2023-01-04 PROCEDURE — 84443 ASSAY THYROID STIM HORMONE: CPT

## 2023-01-04 PROCEDURE — 73522 X-RAY EXAM HIPS BI 3-4 VIEWS: CPT

## 2023-01-04 PROCEDURE — U0005: CPT

## 2023-01-04 PROCEDURE — 97535 SELF CARE MNGMENT TRAINING: CPT

## 2023-01-04 PROCEDURE — 70450 CT HEAD/BRAIN W/O DYE: CPT

## 2023-01-04 PROCEDURE — C9399: CPT

## 2023-01-04 PROCEDURE — 87186 SC STD MICRODIL/AGAR DIL: CPT

## 2023-01-04 PROCEDURE — 85025 COMPLETE CBC W/AUTO DIFF WBC: CPT

## 2023-01-04 PROCEDURE — 86900 BLOOD TYPING SEROLOGIC ABO: CPT

## 2023-01-04 PROCEDURE — 87640 STAPH A DNA AMP PROBE: CPT

## 2023-01-04 PROCEDURE — 85730 THROMBOPLASTIN TIME PARTIAL: CPT

## 2023-01-04 PROCEDURE — 81001 URINALYSIS AUTO W/SCOPE: CPT

## 2023-01-04 PROCEDURE — 72131 CT LUMBAR SPINE W/O DYE: CPT

## 2023-01-04 PROCEDURE — 82962 GLUCOSE BLOOD TEST: CPT

## 2023-01-19 ENCOUNTER — APPOINTMENT (OUTPATIENT)
Dept: ORTHOPEDIC SURGERY | Facility: CLINIC | Age: 88
End: 2023-01-19
Payer: MEDICARE

## 2023-01-19 VITALS — WEIGHT: 108 LBS | BODY MASS INDEX: 18.44 KG/M2 | HEIGHT: 64 IN

## 2023-01-19 DIAGNOSIS — S72.142D DISPLACED INTERTROCHANTERIC FRACTURE OF LEFT FEMUR, SUBSEQUENT ENCOUNTER FOR CLOSED FRACTURE WITH ROUTINE HEALING: ICD-10-CM

## 2023-01-19 PROCEDURE — 99024 POSTOP FOLLOW-UP VISIT: CPT

## 2023-01-19 PROCEDURE — 73501 X-RAY EXAM HIP UNI 1 VIEW: CPT | Mod: 26

## 2023-01-19 PROCEDURE — 72170 X-RAY EXAM OF PELVIS: CPT | Mod: LT

## 2023-01-20 NOTE — HISTORY OF PRESENT ILLNESS
[de-identified] : s/p ORIF of left hip. [de-identified] : The patient is a 89 year female who returns for the 1st postoperative visit after undergoing ORIF of left hip at [facility]. The surgery was on 12/04/2022. The patient is recovering at [facility]. She complains of pain notably during and after physical therapy.  [de-identified] : History of Dementia. \par \par She is accompanied by her daughters today.\par \par Left Hip:\par She presents to the office in a wheelchair.\par Dissolvable sutures in place.\par Incision site is well healed, without signs of postoperative infection. [de-identified] : AP and lateral views of the LEFT hip and pelvis were obtained and revealed an intertrochanteric fracture stabilized by Synthes 12 x 170, 130 degrees trochanteric femoral nail with a size 85 spiral blade, size 30 6-mm distal locking bolt. Hardware and fracture are well aligned.  [de-identified] : She may begin to WBAT with the assistance of a walker. \par Continue with PT.\par Follow up in 3 months for repeat xrays, if needed.

## 2023-01-20 NOTE — ADDENDUM
[FreeTextEntry1] : I, Mikayla Joshua wrote this note acting as a scribe for Dr. Javon Blair on Jan 19, 2023.

## 2023-01-20 NOTE — END OF VISIT
[FreeTextEntry3] : All medical record entries made by the Scribe were at my,  Dr. Javon Blair MD., direction and personally dictated by me on 01/19/2023. I have personally reviewed the chart and agree that the record accurately reflects my personal performance of the history, physical exam, assessment and plan.

## 2023-03-18 ENCOUNTER — INPATIENT (INPATIENT)
Facility: HOSPITAL | Age: 88
LOS: 4 days | Discharge: HOME HEALTH | DRG: 689 | End: 2023-03-23
Attending: STUDENT IN AN ORGANIZED HEALTH CARE EDUCATION/TRAINING PROGRAM | Admitting: STUDENT IN AN ORGANIZED HEALTH CARE EDUCATION/TRAINING PROGRAM
Payer: MEDICARE

## 2023-03-18 VITALS
HEIGHT: 66 IN | TEMPERATURE: 98 F | DIASTOLIC BLOOD PRESSURE: 97 MMHG | HEART RATE: 110 BPM | WEIGHT: 100.09 LBS | RESPIRATION RATE: 16 BRPM | SYSTOLIC BLOOD PRESSURE: 134 MMHG | OXYGEN SATURATION: 98 %

## 2023-03-18 DIAGNOSIS — N12 TUBULO-INTERSTITIAL NEPHRITIS, NOT SPECIFIED AS ACUTE OR CHRONIC: ICD-10-CM

## 2023-03-18 DIAGNOSIS — E78.5 HYPERLIPIDEMIA, UNSPECIFIED: ICD-10-CM

## 2023-03-18 DIAGNOSIS — I10 ESSENTIAL (PRIMARY) HYPERTENSION: ICD-10-CM

## 2023-03-18 DIAGNOSIS — Z29.9 ENCOUNTER FOR PROPHYLACTIC MEASURES, UNSPECIFIED: ICD-10-CM

## 2023-03-18 DIAGNOSIS — I48.20 CHRONIC ATRIAL FIBRILLATION, UNSPECIFIED: ICD-10-CM

## 2023-03-18 DIAGNOSIS — I50.32 CHRONIC DIASTOLIC (CONGESTIVE) HEART FAILURE: ICD-10-CM

## 2023-03-18 DIAGNOSIS — S72.143A DISPLACED INTERTROCHANTERIC FRACTURE OF UNSPECIFIED FEMUR, INITIAL ENCOUNTER FOR CLOSED FRACTURE: Chronic | ICD-10-CM

## 2023-03-18 DIAGNOSIS — R93.89 ABNORMAL FINDINGS ON DIAGNOSTIC IMAGING OF OTHER SPECIFIED BODY STRUCTURES: ICD-10-CM

## 2023-03-18 LAB
ALBUMIN SERPL ELPH-MCNC: 3.5 G/DL — SIGNIFICANT CHANGE UP (ref 3.3–5)
ALP SERPL-CCNC: 78 U/L — SIGNIFICANT CHANGE UP (ref 40–120)
ALT FLD-CCNC: 15 U/L — SIGNIFICANT CHANGE UP (ref 10–45)
ANION GAP SERPL CALC-SCNC: 10 MMOL/L — SIGNIFICANT CHANGE UP (ref 5–17)
APPEARANCE UR: ABNORMAL
AST SERPL-CCNC: 19 U/L — SIGNIFICANT CHANGE UP (ref 10–40)
BACTERIA # UR AUTO: ABNORMAL
BASOPHILS # BLD AUTO: 0.08 K/UL — SIGNIFICANT CHANGE UP (ref 0–0.2)
BASOPHILS NFR BLD AUTO: 0.9 % — SIGNIFICANT CHANGE UP (ref 0–2)
BILIRUB SERPL-MCNC: 0.6 MG/DL — SIGNIFICANT CHANGE UP (ref 0.2–1.2)
BILIRUB UR-MCNC: NEGATIVE — SIGNIFICANT CHANGE UP
BUN SERPL-MCNC: 14 MG/DL — SIGNIFICANT CHANGE UP (ref 7–23)
CALCIUM SERPL-MCNC: 9.6 MG/DL — SIGNIFICANT CHANGE UP (ref 8.4–10.5)
CHLORIDE SERPL-SCNC: 104 MMOL/L — SIGNIFICANT CHANGE UP (ref 96–108)
CO2 SERPL-SCNC: 28 MMOL/L — SIGNIFICANT CHANGE UP (ref 22–31)
COLOR SPEC: YELLOW — SIGNIFICANT CHANGE UP
CREAT SERPL-MCNC: 0.73 MG/DL — SIGNIFICANT CHANGE UP (ref 0.5–1.3)
DIFF PNL FLD: NEGATIVE — SIGNIFICANT CHANGE UP
EGFR: 77 ML/MIN/1.73M2 — SIGNIFICANT CHANGE UP
EOSINOPHIL # BLD AUTO: 0.2 K/UL — SIGNIFICANT CHANGE UP (ref 0–0.5)
EOSINOPHIL NFR BLD AUTO: 2.3 % — SIGNIFICANT CHANGE UP (ref 0–6)
EPI CELLS # UR: 0 /HPF — SIGNIFICANT CHANGE UP
FLUAV AG NPH QL: SIGNIFICANT CHANGE UP
FLUBV AG NPH QL: SIGNIFICANT CHANGE UP
GLUCOSE SERPL-MCNC: 101 MG/DL — HIGH (ref 70–99)
GLUCOSE UR QL: NEGATIVE — SIGNIFICANT CHANGE UP
HCT VFR BLD CALC: 36.2 % — SIGNIFICANT CHANGE UP (ref 34.5–45)
HGB BLD-MCNC: 11.3 G/DL — LOW (ref 11.5–15.5)
HYALINE CASTS # UR AUTO: 1 /LPF — SIGNIFICANT CHANGE UP (ref 0–2)
IMM GRANULOCYTES NFR BLD AUTO: 0.3 % — SIGNIFICANT CHANGE UP (ref 0–0.9)
KETONES UR-MCNC: NEGATIVE — SIGNIFICANT CHANGE UP
LEUKOCYTE ESTERASE UR-ACNC: ABNORMAL
LIDOCAIN IGE QN: 20 U/L — SIGNIFICANT CHANGE UP (ref 7–60)
LYMPHOCYTES # BLD AUTO: 1.62 K/UL — SIGNIFICANT CHANGE UP (ref 1–3.3)
LYMPHOCYTES # BLD AUTO: 18.4 % — SIGNIFICANT CHANGE UP (ref 13–44)
MCHC RBC-ENTMCNC: 29.6 PG — SIGNIFICANT CHANGE UP (ref 27–34)
MCHC RBC-ENTMCNC: 31.2 GM/DL — LOW (ref 32–36)
MCV RBC AUTO: 94.8 FL — SIGNIFICANT CHANGE UP (ref 80–100)
MONOCYTES # BLD AUTO: 0.62 K/UL — SIGNIFICANT CHANGE UP (ref 0–0.9)
MONOCYTES NFR BLD AUTO: 7 % — SIGNIFICANT CHANGE UP (ref 2–14)
NEUTROPHILS # BLD AUTO: 6.26 K/UL — SIGNIFICANT CHANGE UP (ref 1.8–7.4)
NEUTROPHILS NFR BLD AUTO: 71.1 % — SIGNIFICANT CHANGE UP (ref 43–77)
NITRITE UR-MCNC: POSITIVE
NRBC # BLD: 0 /100 WBCS — SIGNIFICANT CHANGE UP (ref 0–0)
PH UR: 6 — SIGNIFICANT CHANGE UP (ref 5–8)
PLATELET # BLD AUTO: 301 K/UL — SIGNIFICANT CHANGE UP (ref 150–400)
POTASSIUM SERPL-MCNC: 4 MMOL/L — SIGNIFICANT CHANGE UP (ref 3.5–5.3)
POTASSIUM SERPL-SCNC: 4 MMOL/L — SIGNIFICANT CHANGE UP (ref 3.5–5.3)
PROT SERPL-MCNC: 6.1 G/DL — SIGNIFICANT CHANGE UP (ref 6–8.3)
PROT UR-MCNC: ABNORMAL
RBC # BLD: 3.82 M/UL — SIGNIFICANT CHANGE UP (ref 3.8–5.2)
RBC # FLD: 14.5 % — SIGNIFICANT CHANGE UP (ref 10.3–14.5)
RBC CASTS # UR COMP ASSIST: 1 /HPF — SIGNIFICANT CHANGE UP (ref 0–4)
RSV RNA NPH QL NAA+NON-PROBE: SIGNIFICANT CHANGE UP
SARS-COV-2 RNA SPEC QL NAA+PROBE: SIGNIFICANT CHANGE UP
SODIUM SERPL-SCNC: 142 MMOL/L — SIGNIFICANT CHANGE UP (ref 135–145)
SP GR SPEC: 1.03 — HIGH (ref 1.01–1.02)
UROBILINOGEN FLD QL: NEGATIVE — SIGNIFICANT CHANGE UP
WBC # BLD: 8.81 K/UL — SIGNIFICANT CHANGE UP (ref 3.8–10.5)
WBC # FLD AUTO: 8.81 K/UL — SIGNIFICANT CHANGE UP (ref 3.8–10.5)
WBC UR QL: 677 /HPF — HIGH (ref 0–5)

## 2023-03-18 PROCEDURE — 99223 1ST HOSP IP/OBS HIGH 75: CPT

## 2023-03-18 PROCEDURE — 74177 CT ABD & PELVIS W/CONTRAST: CPT | Mod: 26,MA

## 2023-03-18 PROCEDURE — 99285 EMERGENCY DEPT VISIT HI MDM: CPT | Mod: GC

## 2023-03-18 PROCEDURE — 76705 ECHO EXAM OF ABDOMEN: CPT | Mod: 26

## 2023-03-18 PROCEDURE — 72170 X-RAY EXAM OF PELVIS: CPT | Mod: 26

## 2023-03-18 PROCEDURE — 73552 X-RAY EXAM OF FEMUR 2/>: CPT | Mod: 26,LT

## 2023-03-18 RX ORDER — OLANZAPINE 15 MG/1
2.5 TABLET, FILM COATED ORAL ONCE
Refills: 0 | Status: COMPLETED | OUTPATIENT
Start: 2023-03-18 | End: 2023-03-18

## 2023-03-18 RX ORDER — CEFTRIAXONE 500 MG/1
1000 INJECTION, POWDER, FOR SOLUTION INTRAMUSCULAR; INTRAVENOUS EVERY 24 HOURS
Refills: 0 | Status: DISCONTINUED | OUTPATIENT
Start: 2023-03-19 | End: 2023-03-21

## 2023-03-18 RX ORDER — ACETAMINOPHEN 500 MG
675 TABLET ORAL ONCE
Refills: 0 | Status: COMPLETED | OUTPATIENT
Start: 2023-03-18 | End: 2023-03-18

## 2023-03-18 RX ORDER — SENNA PLUS 8.6 MG/1
2 TABLET ORAL AT BEDTIME
Refills: 0 | Status: DISCONTINUED | OUTPATIENT
Start: 2023-03-18 | End: 2023-03-23

## 2023-03-18 RX ORDER — ATORVASTATIN CALCIUM 80 MG/1
10 TABLET, FILM COATED ORAL AT BEDTIME
Refills: 0 | Status: DISCONTINUED | OUTPATIENT
Start: 2023-03-18 | End: 2023-03-23

## 2023-03-18 RX ORDER — VANCOMYCIN HCL 1 G
750 VIAL (EA) INTRAVENOUS ONCE
Refills: 0 | Status: COMPLETED | OUTPATIENT
Start: 2023-03-18 | End: 2023-03-18

## 2023-03-18 RX ORDER — METOPROLOL TARTRATE 50 MG
50 TABLET ORAL DAILY
Refills: 0 | Status: DISCONTINUED | OUTPATIENT
Start: 2023-03-18 | End: 2023-03-23

## 2023-03-18 RX ORDER — FUROSEMIDE 40 MG
40 TABLET ORAL DAILY
Refills: 0 | Status: DISCONTINUED | OUTPATIENT
Start: 2023-03-18 | End: 2023-03-23

## 2023-03-18 RX ORDER — METOPROLOL TARTRATE 50 MG
50 TABLET ORAL ONCE
Refills: 0 | Status: DISCONTINUED | OUTPATIENT
Start: 2023-03-18 | End: 2023-03-18

## 2023-03-18 RX ORDER — METOPROLOL TARTRATE 50 MG
50 TABLET ORAL DAILY
Refills: 0 | Status: DISCONTINUED | OUTPATIENT
Start: 2023-03-18 | End: 2023-03-18

## 2023-03-18 RX ORDER — ACETAMINOPHEN 500 MG
650 TABLET ORAL EVERY 6 HOURS
Refills: 0 | Status: DISCONTINUED | OUTPATIENT
Start: 2023-03-18 | End: 2023-03-23

## 2023-03-18 RX ORDER — SODIUM CHLORIDE 9 MG/ML
500 INJECTION INTRAMUSCULAR; INTRAVENOUS; SUBCUTANEOUS ONCE
Refills: 0 | Status: COMPLETED | OUTPATIENT
Start: 2023-03-18 | End: 2023-03-18

## 2023-03-18 RX ORDER — CEFTRIAXONE 500 MG/1
1000 INJECTION, POWDER, FOR SOLUTION INTRAMUSCULAR; INTRAVENOUS ONCE
Refills: 0 | Status: COMPLETED | OUTPATIENT
Start: 2023-03-18 | End: 2023-03-18

## 2023-03-18 RX ORDER — APIXABAN 2.5 MG/1
2.5 TABLET, FILM COATED ORAL EVERY 12 HOURS
Refills: 0 | Status: DISCONTINUED | OUTPATIENT
Start: 2023-03-18 | End: 2023-03-23

## 2023-03-18 RX ORDER — VANCOMYCIN HCL 1 G
1000 VIAL (EA) INTRAVENOUS ONCE
Refills: 0 | Status: DISCONTINUED | OUTPATIENT
Start: 2023-03-18 | End: 2023-03-18

## 2023-03-18 RX ORDER — METOPROLOL TARTRATE 50 MG
5 TABLET ORAL ONCE
Refills: 0 | Status: COMPLETED | OUTPATIENT
Start: 2023-03-18 | End: 2023-03-18

## 2023-03-18 RX ORDER — LISINOPRIL 2.5 MG/1
20 TABLET ORAL DAILY
Refills: 0 | Status: DISCONTINUED | OUTPATIENT
Start: 2023-03-18 | End: 2023-03-23

## 2023-03-18 RX ORDER — POLYETHYLENE GLYCOL 3350 17 G/17G
17 POWDER, FOR SOLUTION ORAL DAILY
Refills: 0 | Status: DISCONTINUED | OUTPATIENT
Start: 2023-03-18 | End: 2023-03-23

## 2023-03-18 RX ADMIN — OLANZAPINE 2.5 MILLIGRAM(S): 15 TABLET, FILM COATED ORAL at 10:46

## 2023-03-18 RX ADMIN — Medication 50 MILLIGRAM(S): at 21:22

## 2023-03-18 RX ADMIN — Medication 250 MILLIGRAM(S): at 23:05

## 2023-03-18 RX ADMIN — Medication 5 MILLIGRAM(S): at 20:33

## 2023-03-18 RX ADMIN — CEFTRIAXONE 100 MILLIGRAM(S): 500 INJECTION, POWDER, FOR SOLUTION INTRAMUSCULAR; INTRAVENOUS at 14:40

## 2023-03-18 RX ADMIN — Medication 270 MILLIGRAM(S): at 09:56

## 2023-03-18 RX ADMIN — OLANZAPINE 2.5 MILLIGRAM(S): 15 TABLET, FILM COATED ORAL at 09:43

## 2023-03-18 RX ADMIN — SODIUM CHLORIDE 500 MILLILITER(S): 9 INJECTION INTRAMUSCULAR; INTRAVENOUS; SUBCUTANEOUS at 14:59

## 2023-03-18 NOTE — H&P ADULT - PROBLEM SELECTOR PLAN 4
- c/w home Ramipril equivalent w/ hold parameters - No s/s of acute exacerbation  - c/w Lasix 40mg qd w/ hold parameters

## 2023-03-18 NOTE — ED ADULT NURSE NOTE - OBJECTIVE STATEMENT
0913 pt 89yf aox1 bib ems/[facility] c/o back hip pain, pt noted Ewiiaapaayp, just continued c/o pain, pt in no acute distress vss pending eval

## 2023-03-18 NOTE — ED PROVIDER NOTE - PROGRESS NOTE DETAILS
O'Leyla DO PGY-3: pt ripped out IV. Ordered one to one O'Leyla DO PGY-3: paged surgery for c/s given GB findings Attending MD Burns.  Pt signed out to me in stable condition pending surg c/s 2/2 concern acute cathryn, RUQ sono, dispo per surg > TBA, 89 yo fem with pmhx dementia with L intertroch hip fx repaired by ortho in december, PT yesterday, couldn't stand today. Deborah Morejon MD (PGY-1 EM): patient was tachy, Afib RVR to 150's. administered metoprolol 5mg IV which decreased HR to 109. administered home metoprolol 50mg. pending surg consult.

## 2023-03-18 NOTE — ED PROVIDER NOTE - OBJECTIVE STATEMENT
Cassy DO PGY-3:  89-year-old female past medical history of left femur intertrochanteric fracture here in December, atrial fibrillation on Eliquis, hypertension, hyperlipidemia, dementia, macular degeneration and CHF presents brought in by EMS as patient was unable to get out of bed this morning.  Per EMS patient had a left flank pain.  Patient is AAO X1–2.  Daughter at bedside for collateral.  See MDM

## 2023-03-18 NOTE — H&P ADULT - ASSESSMENT
89yo F w/ PMH of HTN, HLD, Afib on Eliquis, HFpEF on Lasix, recent femur fx s/p repair, dementia and macular degeneration pw weakness and flank pain at home

## 2023-03-18 NOTE — ED ADULT NURSE REASSESSMENT NOTE - NS ED NURSE REASSESS COMMENT FT1
Report received from LEVI Angel. Pt does not appear to be in any acute distress. Pt on cardiac monitor (afib). . Pt placed on 4L NC 96% for poor resp pleth. MD Burns aware. Pt daughter at bedside. 1:1 in place for pt safety. Per day shift RN, pt agitated earlier and was pulling at her lines. MD Morejon to reassess pt.
0957 pt continues to scream at rn pca, dolly during change of soiled linens, pt re educated and informed that she was in the hospital, per report from ems, has not [place] any of her po meds [place] [facility] [place], pending eval at this time, medicated as ordered
1233 pt ordered for straight cath, tolerated well noted w/ urinary out put of 400 via bag w/in insertion of the catheter md made aware

## 2023-03-18 NOTE — H&P ADULT - NSHPLABSRESULTS_GEN_ALL_CORE
LABS:                      11.3   8.81  )-----------( 301      ( 18 Mar 2023 10:04 )             36.2     -18    142  |  104  |  14  ----------------------------<  101<H>  4.0   |  28  |  0.73    Ca    9.6      18 Mar 2023 10:04    TPro  6.1  /  Alb  3.5  /  TBili  0.6  /  DBili  x   /  AST  19  /  ALT  15  /  AlkPhos  78  03-18    LIVER FUNCTIONS - ( 18 Mar 2023 10:04 )  Alb: 3.5 g/dL / Pro: 6.1 g/dL / ALK PHOS: 78 U/L / ALT: 15 U/L / AST: 19 U/L / GGT: x           Urinalysis Basic - ( 18 Mar 2023 12:40 )  Color: Yellow / Appearance: Slightly Turbid / S.029 / pH: x  Gluc: x / Ketone: Negative  / Bili: Negative / Urobili: Negative   Blood: x / Protein: 30 mg/dL / Nitrite: Positive   Leuk Esterase: Large / RBC: 1 /hpf /  /HPF   Sq Epi: x / Non Sq Epi: 0 /hpf / Bacteria: Many    IMAGING:  CT Abdomen and Pelvis w/ IV Cont (23 @ 12:12)  IMPRESSION:  - Moderately distended gallbladder with small layering stones and pericholecystic edema, equivocal for acute cholecystitis. Follow-up nuclear medicine hepatobiliary scan is recommended.  - Common bile duct measures up to 10 mm in size.  - Bulbous appearance of the distal pancreatic involving the head with findings suggestive of pancreatic divisum. Small punctate cyst is seen without definitive solid lesion  - Moderate bilateral pleural effusions with compressive atelectasis.    US Abdomen Upper Quadrant Right (23 @ 18:53)  IMPRESSION:  - Increased caliber of the extrahepatic CBD and pancreatic duct noted. The gallbladder is distended with gallstones and echogenic sludge. Marginal gallbladder wall thickening noted at 3 mm. Perihepatic ascites. Right pleural effusion.    [X] Imaging personally reviewed by me-   [X] ECG personally reviewed by me- Phill RVR 110s

## 2023-03-18 NOTE — ED PROVIDER NOTE - PHYSICAL EXAMINATION
CONSTITUTIONAL: Well-developed; well-nourished; in no acute distress.   SKIN: warm, dry  HEAD: Normocephalic; atraumatic.  EYES: no conjunctival injection. PERRL.   ENT: No nasal discharge; airway clear.  NECK: Supple; non tender.  CARD: S1, S2 normal; Regular rate and rhythm.   RESP: No wheezes, rales or rhonchi. Good air movement bilaterally.   ABD: +ttp luq and L flank   EXT: +2 b/l DP pulses. able to perform b/l hip flexion. No ttp on L greater trochanter.   NEURO: AOx1-2  PSYCH: Cooperative, appropriate.

## 2023-03-18 NOTE — CONSULT NOTE ADULT - SUBJECTIVE AND OBJECTIVE BOX
SURGERY CONSULT NOTE  Consulting surgical team: ACS  Consulting attending: Dr. Graf    HPI:  89yo F h/o Afib (on Eliquis), HTN, dementia, CHF, L femur fx (s/p repair 2022), presenting due to weakness and altered mental status this morning. Per daughter, pt was less interactive than usual. Also reporting low back pain.     Surgery consulted to r/o biliary pathology based on CT findings. Pt found to have positive UA, reports some burning when she urinates.    At baseline, lives with daughter - used to be able to walk one block before getting tired, however since hip surgery has been deconditioned, and cannot walk anymore.       PAST MEDICAL HISTORY:  Hypertension    Dementia        PAST SURGICAL HISTORY:      MEDICATIONS:  metoprolol succinate ER 50 milliGRAM(s) Oral daily  vancomycin  IVPB 750 milliGRAM(s) IV Intermittent Once      ALLERGIES:  No Known Allergies      VITALS & I/Os:  Vital Signs Last 24 Hrs  T(C): 36.4 (18 Mar 2023 19:41), Max: 36.7 (18 Mar 2023 08:55)  T(F): 97.6 (18 Mar 2023 19:41), Max: 98 (18 Mar 2023 08:55)  HR: 151 (18 Mar 2023 20:32) (110 - 151)  BP: 142/94 (18 Mar 2023 20:32) (119/108 - 142/94)  BP(mean): --  RR: 20 (18 Mar 2023 20:32) (16 - 22)  SpO2: 99% (18 Mar 2023 20:32) (95% - 100%)    Parameters below as of 18 Mar 2023 20:32  Patient On (Oxygen Delivery Method): nasal cannula  O2 Flow (L/min): 2      I&O's Summary      PHYSICAL EXAM:  GEN: resting comfortably in bed, in NAD  CHEST: no increased WOB  ABD: soft, non-distended, nontender to palpation  EXT: warm, well perfused  NEURO: A&Ox3    LABS:                        11.3   8.81  )-----------( 301      ( 18 Mar 2023 10:04 )             36.2     03-18    142  |  104  |  14  ----------------------------<  101<H>  4.0   |  28  |  0.73    Ca    9.6      18 Mar 2023 10:04    TPro  6.1  /  Alb  3.5  /  TBili  0.6  /  DBili  x   /  AST  19  /  ALT  15  /  AlkPhos  78  -    Lactate:              Urinalysis Basic - ( 18 Mar 2023 12:40 )    Color: Yellow / Appearance: Slightly Turbid / S.029 / pH: x  Gluc: x / Ketone: Negative  / Bili: Negative / Urobili: Negative   Blood: x / Protein: 30 mg/dL / Nitrite: Positive   Leuk Esterase: Large / RBC: 1 /hpf /  /HPF   Sq Epi: x / Non Sq Epi: 0 /hpf / Bacteria: Many        IMAGING:  < from: CT Abdomen and Pelvis w/ IV Cont (23 @ 12:12) >  FINDINGS:  LOWER CHEST: Moderate bilateral pleural effusions with compressive   atelectasis. Cardiomegaly. Coronary artery calcifications.    LIVER: Normal morphology. Periportal edema.  BILE DUCTS: Common bile duct measures up to 10 mm in size without   definitive intrahepaticductal dilatation or intraluminal abnormality  GALLBLADDER: Moderately distended gallbladder with layering stones and   pericholecystic fluid.  SPLEEN: Within normal limits.  PANCREAS: Bulbous appearance the pancreatic duct involving the head in   the setting of likely pancreatic divisum. Small cystic areas seen along   the lateral border  ADRENALS: Within normal limits.  KIDNEYS/URETERS: Within normal limits.    BLADDER: Within normal limits.  REPRODUCTIVE ORGANS: No mass.    BOWEL: No bowel obstruction.  PERITONEUM: Small upper abdominal ascites.  VESSELS: Significant atherosclerotic changes without aneurysm  RETROPERITONEUM/LYMPH NODES: No lymphadenopathy.  ABDOMINAL WALL: Right groin hernia containing nonobstructed bowel and   fluid.  BONES: Degenerative changes. Left hip ORIF. Scoliosis.    IMPRESSION:  Moderately distended gallbladder with small layering stones and   pericholecystic edema, equivocal for acute cholecystitis. Follow-up   nuclear medicine hepatobiliary scan is recommended.    Common bile duct measures up to 10 mm in size.    Bulbous appearance of the distal pancreatic involving the head with   findings suggestive of pancreatic divisum. Small punctate cyst is seen   without definitive solid lesion    Moderate bilateral pleural effusions with compressive atelectasis.    < end of copied text >  < from: US Abdomen Upper Quadrant Right (23 @ 18:53) >  FINDINGS:  Liver: Normal in size. No focal hepatic masses are identified. Periportal   edema noted.  Bile ducts: Normal caliber. Extrahepatic common bile duct measures 11 mm,   prominent allowing for patient age..  Gallbladder: Distended with gallstones and echogenic sludge. Marginal   gallbladder wall thickening noted at 3 mm.  Pancreas: Not well visualized, but pancreatic ductal dilatation is   identified.  Right kidney: 10.6 cm. No hydronephrosis. Atrophic.  Ascites: Perihepatic ascites.  IVC and aorta: Visualized portions are within normal limits.  Note made of right pleural effusion.    IMPRESSION:  Increased caliber of the extrahepatic CBD and pancreatic duct noted. The   gallbladder is distended with gallstones and echogenic sludge. Marginal   gallbladder wall thickening noted at 3 mm. Perihepatic ascites. Right   pleural effusion.        < end of copied text >

## 2023-03-18 NOTE — H&P ADULT - NSHPPHYSICALEXAM_GEN_ALL_CORE
Vital Signs Last 24 Hrs  T(C): 36.4 (18 Mar 2023 19:41), Max: 36.7 (18 Mar 2023 08:55)  T(F): 97.6 (18 Mar 2023 19:41), Max: 98 (18 Mar 2023 08:55)  HR: 112 (18 Mar 2023 22:15) (110 - 151)  BP: 142/94 (18 Mar 2023 20:32) (119/108 - 142/94)  RR: 20 (18 Mar 2023 20:32) (16 - 22)  SpO2: 99% (18 Mar 2023 20:32) (95% - 100%)    CONSTITUTIONAL: Well-groomed, in no apparent distress  EYES: No conjunctival or scleral injection, non-icteric;   ENMT: No external nasal lesions; MMM  NECK: Trachea midline without palpable neck mass; thyroid not enlarged and non-tender  RESPIRATORY: Breathing comfortably; no dullness to percussion; lungs CTA without wheeze/rhonchi/rales  CARDIOVASCULAR: +S1S2, RRR, no M/G/R; pedal pulses full and symmetric; no lower extremity edema  GASTROINTESTINAL: LUQ/flank pain. No palpable masses. +BS throughout, no rebound/guarding; no hepatosplenomegaly; no hernia palpated  LYMPHATIC: No cervical LAD or tenderness  SKIN: No rashes or ulcers noted  NEUROLOGIC: CN II-XII intact; sensation intact in LEs b/l to light touch  PSYCHIATRIC: A&Ox1; mood and affect appropriate; inappropriate insight and judgment

## 2023-03-18 NOTE — ED PROVIDER NOTE - CARE PLAN
Principal Discharge DX:	Pyelonephritis  Secondary Diagnosis:	Cholecystitis  Secondary Diagnosis:	Rapid atrial fibrillation   1

## 2023-03-18 NOTE — H&P ADULT - PROBLEM SELECTOR PLAN 6
DVT ppx: On Eliquis  Diet: Regular  Dispo: Pending clinical improvement c/w home Atorvastatin 10mg qhs

## 2023-03-18 NOTE — ED PROVIDER NOTE - ATTENDING CONTRIBUTION TO CARE
Patient is a 89 yo F with history of HTN, hyperlipidemia, macular degeneration, Dementia here for left sided low back pain that started after physical therapy yesterday. This morning, maria antonia's daughter was unable to get patient out of bed. Patient had a trip and fall in Dec 2022 and had a left hip fracture (S/p L Intertrochanteric Femur Fx) that required surgical fixation. She had a left hip intermedullary star placed on 12/4/2022 with Dr. Blair.  During that admission, patient was transfused PRBCs perioperatively for acute blood loss anemia.  Patient was found to be in urinary retention, and a Leyva was placed. Patient failed subsequent trial of void, and patient to be sent to rehab facility with Leyva for new trial of void once patient is more ambulatory. Pt had afib with RVR 12/8 for which an echo was unremarkable. metoprolol was increased to 75mg. Patient was evaluated postoperatively by physical and occupational therapists for weight bearing as tolerated and advised that patient would benefit from admission to rehab facility. Patient with UTI and was treated with Rocephin in hospital, will transition to Ciprofloxacin PO to be continued at reha Patient is a 89 yo F with history of HTN, hyperlipidemia, macular degeneration, Dementia here for left sided low back pain that started after physical therapy yesterday. This morning, patient's daughter was unable to get patient out of bed. Patient is unable to provide any history.  Patient was agitated and combative during assessment and exam.    Per chart review:  Patient had a trip and fall in Dec 2022 and had a left hip fracture (S/p L Intertrochanteric Femur Fx) that required surgical fixation. She had a left hip intermedullary star placed on 12/4/2022 with Dr. Blair.  During that admission, patient was transfused PRBCs perioperatively for acute blood loss anemia.  Patient was found to be in urinary retention, and a Leyva was placed. Patient failed subsequent trial of void, and patient to be sent to rehab facility with Leyva for new trial of void once patient is more ambulatory. Pt had afib with RVR 12/8 for which an echo was unremarkable. metoprolol was increased to 75mg. Patient was evaluated postoperatively by physical and occupational therapists for weight bearing as tolerated and advised that patient would benefit from admission to rehab facility. Patient with UTI and was treated with Rocephin in hospital, will transition to Ciprofloxacin PO to be continued at rehab.    VS noted  Gen: elderly, agitated  HEENT: EOMI, mmm  Lungs: CTAB/L no C/ W /R   CVS: RRR   Abd; Soft, tenderness to right abdomen, lower abdomen  Ext: no edema, limited ROM of hips (non-compliant and combative for exam).  Skin: no rash  Neuro AAOx0 non focal clear speech  a/p:   Not ambulating/agitated behavior–exam is significant for right abdominal tenderness and lower abdominal tenderness.  Patient is combative and not cooperative with exam.  She repeatedly tells myself and other doctor to leave her alone.  Plan for labs, urinalysis given recent UTI, CT abdomen pelvis.  If patient continues to not be able to ambulate, patient will likely have to be admitted.  Differential includes UTI, intra-abdominal infection, MSK pain.  - Laura RAMEY

## 2023-03-18 NOTE — ED ADULT NURSE NOTE - NS ED NURSE PATIENT LEFT UNIT TIME
Problem: Occupational Therapy Goal  Goal: Occupational Therapy Goal  Description  Goals to be met by: 10/21/19     Patient will increase functional independence with ADLs by performing:    LE Dressing with Minimal Assistance.  Grooming while standing at sink with Stand-by Assistance.  Toileting from bedside commode with Stand-by Assistance for hygiene and clothing management.   Supine to sit with Supervision.  Step transfer with Stand-by Assistance  Toilet transfer to bedside commode with Stand-by Assistance.  Upper extremity exercise program x10 reps per handout, with independence.     Outcome: Ongoing, Progressing  Pt required MOD A for sit to stand transfer from the bed using RW. Pt tolerated session well- left sitting up in the chair with daughter and fiancee present.    23:07

## 2023-03-18 NOTE — H&P ADULT - NSICDXPASTMEDICALHX_GEN_ALL_CORE_FT
PAST MEDICAL HISTORY:  Chronic atrial fibrillation     Chronic heart failure with preserved ejection fraction (HFpEF)     Dementia     HLD (hyperlipidemia)     Hypertension

## 2023-03-18 NOTE — H&P ADULT - PROBLEM SELECTOR PLAN 1
- Positive UA w/ flank tenderness to palpation  - Start CTX 1g qd for now  - f/u Ur cx  - No s/s of systemic illness as no fevers/chills, leukocytosis

## 2023-03-18 NOTE — ED PROVIDER NOTE - CLINICAL SUMMARY MEDICAL DECISION MAKING FREE TEXT BOX
Cassy DO PGY-3:   89-year-old female past medical history of left femur intertrochanteric fracture here in December, atrial fibrillation on Eliquis, hypertension, hyperlipidemia, dementia, macular degeneration and CHF presents brought in by EMS as patient was unable to get out of bed this morning.  Per EMS patient had a left flank pain.  Patient is AAO X1–2.  Daughter at bedside for collateral. Exam -ABD: +ttp luq and L flank EXT: +2 b/l DP pulses. able to perform b/l hip flexion. No ttp on L greater trochanter.  Differential diagnosis includes but not limited to UTI versus abdominal pathology such as pancreatitis versus colitis versus kidney stone.  Will evaluate for pelvic versus femur injury given the recent procedure.  Patient to be admitted as she is unable to get up out of bed which is what prompted daughter to call EMS.

## 2023-03-18 NOTE — H&P ADULT - HISTORY OF PRESENT ILLNESS
Pt is a 89yo F w/ PMH of HTN, HLD, Afib on Eliquis, HFpEF on Lasix, recent femur fx s/p repair, dementia and macular degeneration pw weakness and flank pain at home.    Pt unable to provide full hx, hx obtained from daughter at bedside. This AM pt was unable to get out of bed and complained of left flank pain which was new for her. She was recently discharged from rehab about 1 week prior and was undergoing PT at home w/ last session yesterday. Otherwise denies any F/C, CP, SOB, palpitations, abd pain, N/V, constipation or diarrhea.     In the ED episode of Afib RVR s/p Lopressor 5mg IVP and Toprol 50mg PO. She was also administered CTX, Vancomycin, Zyprexa for agitation.

## 2023-03-18 NOTE — H&P ADULT - PROBLEM SELECTOR PLAN 3
- No s/s of acute exacerbation  - c/w Lasix 40mg qd w/ hold parameters - Moderately distended gallbladder with small layering stones and pericholecystic edema noted on CT A/P  - Surgery suspects unlikely acute cathryn  - No acute surgical intervention

## 2023-03-18 NOTE — H&P ADULT - PROBLEM SELECTOR PLAN 2
- w/ RVR in the ED s/p Lopressor 5mg IVP  - c/w Eliquis 2.5mg BID  - c/w home Toprol 50mg qd and can uptitrate as tolerated   - If further episodes occur ON can consider further Lopressor IVPs if BP tolerates  - Monitor on tele

## 2023-03-19 LAB
ANION GAP SERPL CALC-SCNC: 15 MMOL/L — SIGNIFICANT CHANGE UP (ref 5–17)
BUN SERPL-MCNC: 14 MG/DL — SIGNIFICANT CHANGE UP (ref 7–23)
CALCIUM SERPL-MCNC: 9.2 MG/DL — SIGNIFICANT CHANGE UP (ref 8.4–10.5)
CHLORIDE SERPL-SCNC: 103 MMOL/L — SIGNIFICANT CHANGE UP (ref 96–108)
CO2 SERPL-SCNC: 23 MMOL/L — SIGNIFICANT CHANGE UP (ref 22–31)
CREAT SERPL-MCNC: 0.68 MG/DL — SIGNIFICANT CHANGE UP (ref 0.5–1.3)
EGFR: 81 ML/MIN/1.73M2 — SIGNIFICANT CHANGE UP
GLUCOSE SERPL-MCNC: 102 MG/DL — HIGH (ref 70–99)
HCT VFR BLD CALC: 40.9 % — SIGNIFICANT CHANGE UP (ref 34.5–45)
HGB BLD-MCNC: 12.7 G/DL — SIGNIFICANT CHANGE UP (ref 11.5–15.5)
MAGNESIUM SERPL-MCNC: 2.2 MG/DL — SIGNIFICANT CHANGE UP (ref 1.6–2.6)
MCHC RBC-ENTMCNC: 29.5 PG — SIGNIFICANT CHANGE UP (ref 27–34)
MCHC RBC-ENTMCNC: 31.1 GM/DL — LOW (ref 32–36)
MCV RBC AUTO: 95.1 FL — SIGNIFICANT CHANGE UP (ref 80–100)
NRBC # BLD: 0 /100 WBCS — SIGNIFICANT CHANGE UP (ref 0–0)
PHOSPHATE SERPL-MCNC: 3.5 MG/DL — SIGNIFICANT CHANGE UP (ref 2.5–4.5)
PLATELET # BLD AUTO: 297 K/UL — SIGNIFICANT CHANGE UP (ref 150–400)
POTASSIUM SERPL-MCNC: 3.6 MMOL/L — SIGNIFICANT CHANGE UP (ref 3.5–5.3)
POTASSIUM SERPL-SCNC: 3.6 MMOL/L — SIGNIFICANT CHANGE UP (ref 3.5–5.3)
RBC # BLD: 4.3 M/UL — SIGNIFICANT CHANGE UP (ref 3.8–5.2)
RBC # FLD: 14.6 % — HIGH (ref 10.3–14.5)
SODIUM SERPL-SCNC: 141 MMOL/L — SIGNIFICANT CHANGE UP (ref 135–145)
WBC # BLD: 8.32 K/UL — SIGNIFICANT CHANGE UP (ref 3.8–10.5)
WBC # FLD AUTO: 8.32 K/UL — SIGNIFICANT CHANGE UP (ref 3.8–10.5)

## 2023-03-19 RX ORDER — INFLUENZA VIRUS VACCINE 15; 15; 15; 15 UG/.5ML; UG/.5ML; UG/.5ML; UG/.5ML
0.7 SUSPENSION INTRAMUSCULAR ONCE
Refills: 0 | Status: DISCONTINUED | OUTPATIENT
Start: 2023-03-19 | End: 2023-03-23

## 2023-03-19 RX ADMIN — SENNA PLUS 2 TABLET(S): 8.6 TABLET ORAL at 21:53

## 2023-03-19 RX ADMIN — CEFTRIAXONE 100 MILLIGRAM(S): 500 INJECTION, POWDER, FOR SOLUTION INTRAMUSCULAR; INTRAVENOUS at 15:05

## 2023-03-19 RX ADMIN — Medication 50 MILLIGRAM(S): at 05:29

## 2023-03-19 RX ADMIN — APIXABAN 2.5 MILLIGRAM(S): 2.5 TABLET, FILM COATED ORAL at 17:05

## 2023-03-19 RX ADMIN — ATORVASTATIN CALCIUM 10 MILLIGRAM(S): 80 TABLET, FILM COATED ORAL at 21:53

## 2023-03-19 RX ADMIN — POLYETHYLENE GLYCOL 3350 17 GRAM(S): 17 POWDER, FOR SOLUTION ORAL at 21:54

## 2023-03-19 RX ADMIN — LISINOPRIL 20 MILLIGRAM(S): 2.5 TABLET ORAL at 05:29

## 2023-03-19 RX ADMIN — APIXABAN 2.5 MILLIGRAM(S): 2.5 TABLET, FILM COATED ORAL at 05:29

## 2023-03-19 RX ADMIN — Medication 40 MILLIGRAM(S): at 05:30

## 2023-03-19 NOTE — PATIENT PROFILE ADULT - FALL HARM RISK - HARM RISK INTERVENTIONS

## 2023-03-19 NOTE — CONSULT NOTE ADULT - SUBJECTIVE AND OBJECTIVE BOX
CARDIOLOGY CONSULT - Dr. Escudero  Date of Service: 3/19/2023      HPI:  Pt is a 89yo F w/ PMH of HTN, HLD, Afib on Eliquis, HFpEF on Lasix, recent femur fx s/p repair, dementia and macular degeneration pw weakness and flank pain at home.    Pt unable to provide full hx, hx obtained from daughter at bedside. This AM pt was unable to get out of bed and complained of left flank pain which was new for her. She was recently discharged from rehab about 1 week prior and was undergoing PT at home w/ last session yesterday. Otherwise denies any F/C, CP, SOB, palpitations, abd pain, N/V, constipation or diarrhea.     In the ED episode of Afib RVR s/p Lopressor 5mg IVP and Toprol 50mg PO. She was also administered CTX, Vancomycin, Zyprexa for agitation.         PAST MEDICAL & SURGICAL HISTORY:  Hypertension      Dementia      HLD (hyperlipidemia)      Chronic heart failure with preserved ejection fraction (HFpEF)      Chronic atrial fibrillation      Fx intertrochanteric hip              PREVIOUS DIAGNOSTIC TESTING:    [ ] Echocardiogram:  [ ]  Catheterization:  [ ] Stress Test:  	    MEDICATIONS:  MEDICATIONS  (STANDING):  apixaban 2.5 milliGRAM(s) Oral every 12 hours  atorvastatin 10 milliGRAM(s) Oral at bedtime  cefTRIAXone   IVPB 1000 milliGRAM(s) IV Intermittent every 24 hours  furosemide    Tablet 40 milliGRAM(s) Oral daily  influenza  Vaccine (HIGH DOSE) 0.7 milliLiter(s) IntraMuscular once  lisinopril 20 milliGRAM(s) Oral daily  metoprolol succinate ER 50 milliGRAM(s) Oral daily  polyethylene glycol 3350 17 Gram(s) Oral daily  senna 2 Tablet(s) Oral at bedtime      FAMILY HISTORY:  No pertinent family history in first degree relatives        SOCIAL HISTORY:    [ ] Non-smoker  [ ] Smoker  [ ] Alcohol    Allergies    No Known Allergies    Intolerances    	    REVIEW OF SYSTEMS:  CONSTITUTIONAL: No fever, weight loss, or fatigue  EYES: No eye pain, visual disturbances, or discharge  ENMT:  No difficulty hearing, tinnitus, vertigo; No sinus or throat pain  NECK: No pain or stiffness  RESPIRATORY: No cough, wheezing, chills or hemoptysis; No Shortness of Breath  CARDIOVASCULAR: No chest pain, palpitations, passing out, dizziness, or leg swelling  GASTROINTESTINAL: No abdominal or epigastric pain. No nausea, vomiting, or hematemesis; No diarrhea or constipation. No melena or hematochezia.  GENITOURINARY: No dysuria, frequency, hematuria, or incontinence  NEUROLOGICAL: No headaches, memory loss, loss of strength, numbness, or tremors  SKIN: No itching, burning, rashes, or lesions   	    [ ] All others negative	  [ ] Unable to obtain    PHYSICAL EXAM:  T(C): 36.8 (03-19-23 @ 03:24), Max: 36.8 (03-19-23 @ 03:24)  HR: 83 (03-19-23 @ 03:24) (83 - 151)  BP: 123/85 (03-19-23 @ 03:24) (119/108 - 142/94)  RR: 17 (03-19-23 @ 03:24) (17 - 22)  SpO2: 99% (03-19-23 @ 03:24) (95% - 100%)  Wt(kg): --  I&O's Summary    18 Mar 2023 07:01  -  19 Mar 2023 07:00  --------------------------------------------------------  IN: 175 mL / OUT: 0 mL / NET: 175 mL        Appearance: Normal	  Psychiatry: A & O x 3, Mood & affect appropriate  HEENT:   Normal oral mucosa, PERRL, EOMI	  Lymphatic: No lymphadenopathy  Cardiovascular: Normal S1 S2,RRR, No JVD, No murmurs  Respiratory: Lungs clear to auscultation	  Gastrointestinal:  Soft, Non-tender, + BS	  Skin: No rashes, No ecchymoses, No cyanosis	  Neurologic: Non-focal  Extremities: Normal range of motion, No clubbing, cyanosis or edema  Vascular: Peripheral pulses palpable 2+ bilaterally    TELEMETRY: 	    ECG:  	  RADIOLOGY:  OTHER: 	  	  LABS:	 	    CARDIAC MARKERS:                                  12.7   8.32  )-----------( 297      ( 19 Mar 2023 06:53 )             40.9     03-19    141  |  103  |  14  ----------------------------<  102<H>  3.6   |  23  |  0.68    Ca    9.2      19 Mar 2023 06:52  Phos  3.5     03-19  Mg     2.2     03-19    TPro  6.1  /  Alb  3.5  /  TBili  0.6  /  DBili  x   /  AST  19  /  ALT  15  /  AlkPhos  78  03-18      proBNP:   Lipid Profile:   HgA1c:   TSH:     ASSESSMENT/PLAN: 	              70 minutes spent on total encounter; more than 50% of the visit was spent counseling and/or coordinating care by the attending physician.

## 2023-03-19 NOTE — CONSULT NOTE ADULT - ASSESSMENT
89yo F h/o Afib (on Eliquis), HTN, dementia, CHF, L femur fx (s/p repair 12/2022), presenting due to weakness and altered mental status this morning, likely 2/2 UTI. Surgery consulted to r/o biliary pathology based on CT findings.    Impression/Plan:  - Overall clinical picture unlikely cholecystitis  - Imaging findings more consistent with overall volume overload/redistribution from CHF (perihepatic ascites, gallbladder distension, pleural effusions)  - CBD on CT 10mm which is wnl for patient's age  - No acute surgical intervention indicated  - Defer management of UTI/back pain to ER    ACS/Trauma Surgery  Please page for all questions Pager # [phone], not available on [employer] Teams 
89yo F w/ PMH of HTN, HLD, Afib on Eliquis, HFpEF on Lasix, recent femur fx s/p repair, dementia and macular degeneration pw weakness and flank pain at home    # atrial fibrillation with rapid ventricular response  -in setting of pyelonephritis  -c/w home Toprol dose  -c/w Eliquis 2.5mg BID  -cont tele    # Chronic HFpEF  -volume status acceptable  -cont PO lasix    # Acute  Pyelonephritis  -cont IV abx per primary team  -followup cultures    # R/O acute cathryn  -CT A/P reveals moderately distended gallbladder with small layering stones and pericholecystic edema   -surgery followup.      # Hypertension  -bp controlled  -cont BB/acei      70 minutes spent on total encounter; more than 50% of the visit was spent counseling and/or coordinating care by the attending physician.          
 89yo F w/ PMH of cognitive impairment HTN, HLD, Afib on Eliquis, HFpEF on Lasix, recent femur fx s/p [id number], dementia and macular degeneration pw weakness and altered mental status  left flank pain  cystitis    plan  pt no fever, no leukocytosis -- although in 89 yr old, may not mount  cannot give gu history due to cognitive dysfuntion  so treat for cystitis  no s/s of upper tract infection    no transaminitis  us and ct noted with gall bladder sludge  pain resolved  if any progression of symptoms can check hida    for now ceftriaxone day 2 tailor once cx and sensitivity back to complete 3 -5 days is adequate

## 2023-03-19 NOTE — PROGRESS NOTE ADULT - SUBJECTIVE AND OBJECTIVE BOX
Patient is a 89y old  Female who presents with a chief complaint of UTI (19 Mar 2023 10:27)      SUBJECTIVE / OVERNIGHT EVENTS:    Patient seen and examined. confused . poor historian. denies abd pain denies complaints.      Vital Signs Last 24 Hrs  T(C): 36.8 (19 Mar 2023 03:24), Max: 36.8 (19 Mar 2023 03:24)  T(F): 98.3 (19 Mar 2023 03:24), Max: 98.3 (19 Mar 2023 03:24)  HR: 83 (19 Mar 2023 09:34) (83 - 151)  BP: 123/85 (19 Mar 2023 09:34) (119/108 - 142/94)  BP(mean): --  RR: 17 (19 Mar 2023 03:24) (17 - 22)  SpO2: 99% (19 Mar 2023 03:24) (95% - 100%)    Parameters below as of 19 Mar 2023 03:24  Patient On (Oxygen Delivery Method): nasal cannula  O2 Flow (L/min): 2    I&O's Summary    18 Mar 2023 07:01  -  19 Mar 2023 07:00  --------------------------------------------------------  IN: 175 mL / OUT: 0 mL / NET: 175 mL        PE:  GENERAL: NAD, AAOx1  CHEST/LUNG: CTABL, No wheeze  HEART: Regular rate and rhythm; + murmur  ABDOMEN: Soft, Nontender, Nondistended; Bowel sounds present  EXTREMITIES:  2+ Peripheral Pulses, +1 le edema  NEURO: pleasantly confused    LABS:                        12.7   8.32  )-----------( 297      ( 19 Mar 2023 06:53 )             40.9     03-19    141  |  103  |  14  ----------------------------<  102<H>  3.6   |  23  |  0.68    Ca    9.2      19 Mar 2023 06:52  Phos  3.5     03-  Mg     2.2     -    TPro  6.1  /  Alb  3.5  /  TBili  0.6  /  DBili  x   /  AST  19  /  ALT  15  /  AlkPhos  78  03-18      CAPILLARY BLOOD GLUCOSE            Urinalysis Basic - ( 18 Mar 2023 12:40 )    Color: Yellow / Appearance: Slightly Turbid / S.029 / pH: x  Gluc: x / Ketone: Negative  / Bili: Negative / Urobili: Negative   Blood: x / Protein: 30 mg/dL / Nitrite: Positive   Leuk Esterase: Large / RBC: 1 /hpf /  /HPF   Sq Epi: x / Non Sq Epi: 0 /hpf / Bacteria: Many        RADIOLOGY & ADDITIONAL TESTS:    Imaging Personally Reviewed:  [x] YES  [ ] NO    Consultant(s) Notes Reviewed:  [x] YES  [ ] NO    MEDICATIONS  (STANDING):  apixaban 2.5 milliGRAM(s) Oral every 12 hours  atorvastatin 10 milliGRAM(s) Oral at bedtime  cefTRIAXone   IVPB 1000 milliGRAM(s) IV Intermittent every 24 hours  furosemide    Tablet 40 milliGRAM(s) Oral daily  influenza  Vaccine (HIGH DOSE) 0.7 milliLiter(s) IntraMuscular once  lisinopril 20 milliGRAM(s) Oral daily  metoprolol succinate ER 50 milliGRAM(s) Oral daily  polyethylene glycol 3350 17 Gram(s) Oral daily  senna 2 Tablet(s) Oral at bedtime    MEDICATIONS  (PRN):  acetaminophen     Tablet .. 650 milliGRAM(s) Oral every 6 hours PRN Temp greater or equal to 38C (100.4F), Mild Pain (1 - 3)      Care Discussed with Consultants/Other Providers [x] YES  [ ] NO    HEALTH ISSUES - PROBLEM Dx:  Pyelonephritis    Chronic atrial fibrillation with rapid ventricular response    Hypertension    HLD (hyperlipidemia)    Chronic heart failure with preserved ejection fraction (HFpEF)    Prophylactic measure    Imaging abnormality

## 2023-03-19 NOTE — PROGRESS NOTE ADULT - ASSESSMENT
89yo F h/o Afib (on Eliquis), HTN, dementia, CHF, L femur fx (s/p repair 12/2022), presenting due to weakness and altered mental status this morning, likely 2/2 UTI. Surgery consulted to r/o biliary pathology based on CT findings.    Impression/Plan:  - No acute surgical intervention indicated  - Imaging findings more consistent with overall volume overload/redistribution from CHF (perihepatic ascites, gallbladder distension, pleural effusions)  - CBD on CT 10mm which is wnl for patient's age  - PO challenge, if not tolerating PO then recommend HIDA  - Management of UTI per primary team  - Please call back or page if needed, ACS signing off    ACS Surgery  [id number]

## 2023-03-19 NOTE — CONSULT NOTE ADULT - SUBJECTIVE AND OBJECTIVE BOX
[facility], Division of Infectious Diseases   RONAL Cevallos, GRECIA Cornejo G. Casimir  [phone]   weekends and after hours [phone]    MARGARITA BECERRA  89y, Female  [id number]    HPI--  HPI:  Pt is a 89yo F w/ PMH of HTN, HLD, Afib on Eliquis, HFpEF on Lasix, recent femur fx s/p repair, dementia and macular degeneration pw weakness and flank pain at home.    Pt unable to provide full hx, hx obtained from daughter at bedside. This AM pt was unable to get out of bed and complained of left flank pain which was new for her. She was recently discharged from rehab about 1 week prior and was undergoing PT at home w/ last session yesterday. Otherwise denies any F/C, CP, SOB, palpitations, abd pain, N/V, constipation or diarrhea.     In the ED episode of Afib RVR s/p Lopressor 5mg IVP and Toprol 50mg PO. She was also administered CTX, Vancomycin, Zyprexa for agitation.    (18 Mar 2023 23:26)      PMH/PSH--  Hypertension    Dementia    HLD (hyperlipidemia)    Chronic heart failure with preserved ejection fraction (HFpEF)    Chronic atrial fibrillation    Fx intertrochanteric hip        Allergies--      Medications--  Antibiotics: cefTRIAXone   IVPB 1000 milliGRAM(s) IV Intermittent every 24 hours    Immunologic: influenza  Vaccine (HIGH DOSE) 0.7 milliLiter(s) IntraMuscular once    Other: acetaminophen     Tablet .. PRN  apixaban  atorvastatin  furosemide    Tablet  lisinopril  metoprolol succinate ER  polyethylene glycol 3350  senna      Social History--  EtOH: denies ***  Tobacco: denies ***  Drug Use: denies ***    Family/Marital History--  No pertinent family history in first degree relatives          Travel/Environmental/Occupational History:  *** inserth T/E/O Hx ***    Review of Systems:  REVIEW OF SYSTEMS  General: no fever, no chills, no wt loss	  Ophthalmologic: no blurry vision  Respiratory and Thorax: no cough, no dyspnea  Cardiovascular: no chest pain, no palpitations  Gastrointestinal:  no nausea, no vomiting, diarrhea  Genitourinary: no dysuria, no urgency, no frequency	  Musculoskeletal: no myalgias	  Neurological:  no headache	    Physical Exam--  Vital Signs: T(F): 98.3 (03-19-23 @ 03:24), Max: 98.3 (03-19-23 @ 03:24)  HR: 83 (03-19-23 @ 09:34)  BP: 123/85 (03-19-23 @ 09:34)  RR: 17 (03-19-23 @ 03:24)  SpO2: 99% (03-19-23 @ 03:24)  Wt(kg): --  General: Nontoxic-appearing Female in no acute distress.  HEENT: AT/NC. PERRL. EOMI. Anicteric. Conjunctiva pink and moist. Oropharynx clear. Dentition fair.  Neck: Not rigid. No sense of mass.  Nodes: None palpable.  Lungs: Clear bilaterally without rales, wheezing or rhonchi  Heart: Regular rate and rhythm. No Murmur. No rub. No gallop. No palpable thrill.  Abdomen: Bowel sounds present and normoactive. Soft. Nondistended. Nontender. No sense of mass. No organomegaly.  Back: No spinal tenderness. No costovertebral angle tenderness.   Extremities: No cyanosis or clubbing. No edema.   Skin: Warm. Dry. Good turgor. No rash. No vasculitic stigmata.  Psychiatric: Appropriate affect and mood for situation.         Laboratory & Imaging Data--  CBC                        12.7   8.32  )-----------( 297      ( 19 Mar 2023 06:53 )             40.9       Chemistries  03-19    141  |  103  |  14  ----------------------------<  102<H>  3.6   |  23  |  0.68    Ca    9.2      19 Mar 2023 06:52  Phos  3.5     03-19  Mg     2.2     03-19    TPro  6.1  /  Alb  3.5  /  TBili  0.6  /  DBili  x   /  AST  19  /  ALT  15  /  AlkPhos  78  03-18      Culture Data           [facility], Division of Infectious Diseases   RONAL Cevallos, GRECIA Cornejo G. Casimir  [phone]   weekends and after hours [phone]    MARGARITA BECERRA  89y, Female  [id number]      HPI: pt is a poor historian, history per chart and partially pt    89yo F w/ PMH of HTN, HLD, Afib on Eliquis, HFpEF , recent femur fx s/p repair, dementia and macular degeneration pw weakness and altered mental status     per chart: pt was unable to get out of bed and complained of left flank pain which was new for her.   She was recently discharged from rehab about 1 week prior and was undergoing PT at home w/ last session yesterday.   no diarrhea, no vomiting no cough    in ED atrial fib  today pt states she has nothing to complain about  no pain, no fever, no vomiting, no gu complaints       PMH/PSH--  Hypertension  Dementia  HLD (hyperlipidemia)  Chronic heart failure with preserved ejection fraction (HFpEF)  Chronic atrial fibrillation  Fx intertrochanteric hip        Allergies--nkda      Medications--  Antibiotics: cefTRIAXone   IVPB 1000 milliGRAM(s) IV Intermittent every 24 hours    Immunologic: influenza  Vaccine (HIGH DOSE) 0.7 milliLiter(s) IntraMuscular once    Other: acetaminophen     Tablet .. PRN  apixaban  atorvastatin  furosemide    Tablet  lisinopril  metoprolol succinate ER  polyethylene glycol 3350  senna      Social History--  EtOH: denies ***  Tobacco: denies ***  Drug Use: denies ***    Family/Marital History--  No pertinent family history in first degree relatives    Travel/Environmental/Occupational History:  nc    Review of Systems:  REVIEW OF SYSTEMS  General: no fever, no chills, no wt loss	  Ophthalmologic: no blurry vision  Respiratory and Thorax: no cough, no dyspnea  Cardiovascular: no chest pain, no palpitations  Gastrointestinal:  no nausea, no vomiting, diarrhea  Genitourinary: no dysuria, no urgency, no frequency	  Musculoskeletal: no myalgias	  Neurological:  no headache	    Physical Exam--  Vital Signs: T(F): 98.3 (03-19-23 @ 03:24), Max: 98.3 (03-19-23 @ 03:24)  HR: 83 (03-19-23 @ 09:34)  BP: 123/85 (03-19-23 @ 09:34)  RR: 17 (03-19-23 @ 03:24)  SpO2: 99% (03-19-23 @ 03:24)  Wt(kg): --  General: Nontoxic-appearing Female in no acute distress. frail  HEENT: AT/NC.   Neck: Not rigid. No sense of mass.  Nodes: None palpable.  Lungs: Clear bilaterally without rales, wheezing or rhonchi  Heart: Regular rate and rhythm.  Abdomen: Bowel sounds present and normoactive. Soft.    Back: No spinal tenderness. No costovertebral angle tenderness.   Extremities: No cyanosis or clubbing. No edema.   Skin: Warm. Dry. Good turgor. No rash. No vasculitic stigmata.  Psychiatric: Appropriate affect and mood for situation.         Laboratory & Imaging Data--  CBC                        12.7   8.32  )-----------( 297      ( 19 Mar 2023 06:53 )             40.9       Chemistries  03-19    141  |  103  |  14  ----------------------------<  102<H>  3.6   |  23  |  0.68    Ca    9.2      19 Mar 2023 06:52  Phos  3.5     03-19  Mg     2.2     03-19    TPro  6.1  /  Alb  3.5  /  TBili  0.6  /  DBili  x   /  AST  19  /  ALT  15  /  AlkPhos  78  03-18      Culture Data      < from: US Abdomen Upper Quadrant Right (03.18.23 @ 18:53) >    ACC: [id number] EXAM:  US ABDOMEN RT UPR QUADRANT   ORDERED BY: JUAN DAVID TURCIOS     PROCEDURE DATE:  03/18/2023          INTERPRETATION:  CLINICAL INFORMATION: Abdominal pain. Evaluate for   cholecystitis.    COMPARISON: 3/18/2023. CT abdomen/pelvis.    TECHNIQUE: Sonography of the right upper quadrant.    FINDINGS:  Liver: Normal in size. No focal hepatic masses are identified. Periportal   edema noted.  Bile ducts: Normal caliber. Extrahepatic common bile duct measures 11 mm,   prominent allowing for patient age..  Gallbladder: Distended with gallstones and echogenic sludge. Marginal   gallbladder wall thickening noted at 3 mm.  Pancreas: Not well visualized, but pancreatic ductal dilatation is   identified.  Right kidney: 10.6 cm. No hydronephrosis. Atrophic.  Ascites: Perihepatic ascites.  IVC and aorta: Visualized portions are within normal limits.  Note made of right pleural effusion.    IMPRESSION:  Increased caliber of the extrahepatic CBD and pancreatic duct noted. The   gallbladder is distended with gallstones and echogenic sludge. Marginal   gallbladder wall thickening noted at 3 mm. Perihepatic ascites. Right   pleural effusion.      < end of copied text >  < from: CT Abdomen and Pelvis w/ IV Cont (03.18.23 @ 12:12) >    ACC: [id number] EXAM:  CT ABDOMEN AND PELVIS IC   ORDERED BY: JUAN DAVID TURCIOS     PROCEDURE DATE:  03/18/2023          INTERPRETATION:  CLINICAL INFORMATION: Flank pain    COMPARISON: None.    CONTRAST/COMPLICATIONS:  IV Contrast: Omnipaque 350  90cc administered   10 cc discarded  Oral Contrast: NONE  Complications: None reported at time of study completion    PROCEDURE:  CT of the Abdomen and Pelvis was performed.  Sagittal and coronal reformats were performed.    FINDINGS:  LOWER CHEST: Moderate bilateral pleural effusions with compressive   atelectasis. Cardiomegaly. Coronary artery calcifications.    LIVER: Normal morphology. Periportal edema.  BILE DUCTS: Common bile duct measures up to 10 mm in size without   definitive intrahepaticductal dilatation or intraluminal abnormality  GALLBLADDER: Moderately distended gallbladder with layering stones and   pericholecystic fluid.  SPLEEN: Within normal limits.  PANCREAS: Bulbous appearance the pancreatic duct involving the head in   the setting of likely pancreatic divisum. Small cystic areas seen along   the lateral border  ADRENALS: Within normal limits.  KIDNEYS/URETERS: Within normal limits.    BLADDER: Within normal limits.  REPRODUCTIVE ORGANS: No mass.    BOWEL: No bowel obstruction.  PERITONEUM: Small upper abdominal ascites.  VESSELS: Significant atherosclerotic changes without aneurysm  RETROPERITONEUM/LYMPH NODES: No lymphadenopathy.  ABDOMINAL WALL: Right groin hernia containing nonobstructed bowel and   fluid.  BONES: Degenerative changes. Left hip ORIF. Scoliosis.    IMPRESSION:  Moderately distended gallbladder with small layering stones and   pericholecystic edema, equivocal for acute cholecystitis. Follow-up   nuclear medicine hepatobiliary scan is recommended.    Common bile duct measures up to 10 mm in size.    Bulbous appearance of the distal pancreatic involving the head with   findings suggestive of pancreatic divisum. Small punctate cyst is seen   without definitive solid lesion    Moderate bilateral pleural effusions with compressive atelectasis.    < end of copied text >  < from: Xray Femur 2 Views, Left (03.18.23 @ 12:01) >  ACC: [id number] EXAM:  XR PELVIS AP ONLY 1-2 VIEWS   ORDERED BY: JUAN DAVID TURCIOS     ACC: [id number] EXAM:  XR FEMUR 2 VIEWS LT   ORDERED BY: JUAN DAVID TURCIOS     PROCEDURE DATE:  03/18/2023          INTERPRETATION:  CLINICAL INFORMATION: Left lower extremity pain after   fall.    EXAM: One view of the pelvis, 2 views of the left femur    COMPARISON: 12/10/2022, same day CT abdomen pelvis.    IMPRESSION:  Intact unremarkable proximal left femoral IM star/nail with proximal   helical blade compression screw and distal interlocking screw. Residual   slight inferomedial left femoral neck calcar cortical offset. Otherwise   no dislocations or acute appearing fractures in above imaged anatomic   regions.    Intact pelvic and obturator rings and symmetrically aligned spaced SI   joints and pubic symphysis.    Lower lumbar spine degenerative change apparent. Preserved bilateral hip   joint spaces and no gross radiographic evidence for AVN.    Generalized osteopenia otherwise no discrete lytic or blastic lesions.    External urine catch device overlies vulvar region.  Popcorn type pelvic calcifications probably uterine fibroid related.  Additional smaller lower bilateral hemipelvic phleboliths.  Abdominal aortoiliac vascular calcifications    < end of copied text >

## 2023-03-19 NOTE — PROGRESS NOTE ADULT - ASSESSMENT
89yo F w/ PMH of HTN, HLD, Afib on Eliquis, HFpEF on Lasix, recent femur fx s/p repair, dementia and macular degeneration pw weakness and flank pain at home    # Pyelonephritis  Positive UA w/ flank tenderness to palpation  CTX 1g qd for now  f/u Ur cx    # Chronic atrial fibrillation with rapid ventricular response  w/ RVR in the ED s/p Lopressor 5mg IVP  c/w Eliquis 2.5mg BID  c/w home Toprol 50mg qd and can uptitrate as tolerated   Monitor on tele  cardio consult    # Imaging abnormality  Moderately distended gallbladder with small layering stones and pericholecystic edema noted on CT A/P  Surgery suspects unlikely acute cathryn, No acute surgical intervention.    # Chronic heart failure with preserved ejection fraction (HFpEF)  No s/s of acute exacerbation  c/w Lasix 40mg qd w/ hold parameters    # Hypertension  c/w home Ramipril equivalent w/ hold parameters    # HLD  c/w home Atorvastatin 10mg qhs  DVT ppx: On Eliquis    Please call [facility] with questions [phone].     89yo F w/ PMH of HTN, HLD, Afib on Eliquis, HFpEF on Lasix, recent femur fx s/p repair, dementia and macular degeneration pw weakness and flank pain at home    # Pyelonephritis/UTI  CTX 1g qd   fu urine cx  ID consult  PT     # Chronic atrial fibrillation with rapid ventricular response  c/w Eliquis 2.5mg BID  c/w home Toprol 50mg qd  Monitor on tele  cardio consult    # Imaging abnormality  Moderately distended gallbladder with small layering stones and pericholecystic edema noted on CT A/P  Surgery suspects unlikely acute cathryn, No acute surgical intervention.  pt benign abd exam    # Chronic heart failure with preserved ejection fraction (HFpEF)  No s/s of acute exacerbation  c/w Lasix 40mg qd w/ hold parameters    # Hypertension  c/w home Ramipril equivalent w/ hold parameters    # HLD  c/w home Atorvastatin 10mg qhs    DVT ppx: On Eliquis    Please call [facility] with questions [phone].

## 2023-03-19 NOTE — PATIENT PROFILE ADULT - STATED REASON FOR ADMISSION
"[facility] called. Roc's BP seems to be declining.  It was 130/80 at 12/7 OV.  Lately it has been in the 100's over 60 but today was 100/60.  He states that he has dizziness.  [facility] said that she would continue once a week visit thru 1/20/2017 if you are in agreement. She feels that he is doing \"pretty\" good.   " Left hip pain

## 2023-03-19 NOTE — PROGRESS NOTE ADULT - SUBJECTIVE AND OBJECTIVE BOX
SURGERY CONSULT NOTE  Consulting surgical team: [facility]  Consulting attending: Dr. Graf    HPI:  89yo F h/o Afib (on Eliquis), HTN, dementia, CHF, L femur fx (s/p repair 2022), presenting due to weakness and altered mental status this morning. Per daughter, pt was less interactive than usual. Also reporting low back pain.     Surgery consulted to r/o biliary pathology based on CT findings. Pt found to have positive UA, reports some burning when she urinates.    At baseline, lives with daughter - used to be able to walk one block before getting tired, however since hip surgery has been deconditioned, and cannot walk anymore.     CT showing distended gallbladder, cholelithiasis and CBD 10mm. WBC 8.8. Pancreas not well visualized, but pancreatic ductal dilatation is identified.      PAST MEDICAL HISTORY:  Hypertension    Dementia        PAST SURGICAL HISTORY:      MEDICATIONS:  metoprolol succinate ER 50 milliGRAM(s) Oral daily  vancomycin  IVPB 750 milliGRAM(s) IV Intermittent Once      ALLERGIES:  No Known Allergies      VITALS & I/Os:   Vital Signs Last 24 Hrs  T(C): 36.3 (19 Mar 2023 12:29), Max: 36.8 (19 Mar 2023 03:24)  T(F): 97.4 (19 Mar 2023 12:29), Max: 98.3 (19 Mar 2023 03:24)  HR: 98 (19 Mar 2023 12:29) (83 - 151)  BP: 122/89 (19 Mar 2023 12:29) (119/108 - 142/94)  BP(mean): --  RR: 17 (19 Mar 2023 12:29) (17 - 22)  SpO2: 99% (19 Mar 2023 12:29) (95% - 100%)    Parameters below as of 19 Mar 2023 12:29  Patient On (Oxygen Delivery Method): nasal cannula  O2 Flow (L/min): 2        I&O's Summary      PHYSICAL EXAM:  GEN: resting comfortably in bed, in NAD  CHEST: no increased WOB  ABD: soft, non-distended, nontender to palpation  EXT: warm, well perfused  NEURO: A&Ox3       LABS:                           12.7   8.32  )-----------( 297      ( 19 Mar 2023 06:53 )             40.9     -    141  |  103  |  14  ----------------------------<  102<H>  3.6   |  23  |  0.68    Ca    9.2      19 Mar 2023 06:52  Phos  3.5       Mg     2.2         TPro  6.1  /  Alb  3.5  /  TBili  0.6  /  DBili  x   /  AST  19  /  ALT  15  /  AlkPhos  78        Urinalysis Basic - ( 18 Mar 2023 12:40 )    Color: Yellow / Appearance: Slightly Turbid / S.029 / pH: x  Gluc: x / Ketone: Negative  / Bili: Negative / Urobili: Negative   Blood: x / Protein: 30 mg/dL / Nitrite: Positive   Leuk Esterase: Large / RBC: 1 /hpf /  /HPF   Sq Epi: x / Non Sq Epi: 0 /hpf / Bacteria: Many        IMAGING:  < from: CT Abdomen and Pelvis w/ IV Cont (23 @ 12:12) >  FINDINGS:  LOWER CHEST: Moderate bilateral pleural effusions with compressive   atelectasis. Cardiomegaly. Coronary artery calcifications.    LIVER: Normal morphology. Periportal edema.  BILE DUCTS: Common bile duct measures up to 10 mm in size without   definitive intrahepaticductal dilatation or intraluminal abnormality  GALLBLADDER: Moderately distended gallbladder with layering stones and   pericholecystic fluid.  SPLEEN: Within normal limits.  PANCREAS: Bulbous appearance the pancreatic duct involving the head in   the setting of likely pancreatic divisum. Small cystic areas seen along   the lateral border  ADRENALS: Within normal limits.  KIDNEYS/URETERS: Within normal limits.    BLADDER: Within normal limits.  REPRODUCTIVE ORGANS: No mass.    BOWEL: No bowel obstruction.  PERITONEUM: Small upper abdominal ascites.  VESSELS: Significant atherosclerotic changes without aneurysm  RETROPERITONEUM/LYMPH NODES: No lymphadenopathy.  ABDOMINAL WALL: Right groin hernia containing nonobstructed bowel and   fluid.  BONES: Degenerative changes. Left hip ORIF. Scoliosis.    IMPRESSION:  Moderately distended gallbladder with small layering stones and   pericholecystic edema, equivocal for acute cholecystitis. Follow-up   nuclear medicine hepatobiliary scan is recommended.    Common bile duct measures up to 10 mm in size.    Bulbous appearance of the distal pancreatic involving the head with   findings suggestive of pancreatic divisum. Small punctate cyst is seen   without definitive solid lesion    Moderate bilateral pleural effusions with compressive atelectasis.    < end of copied text >  < from: US Abdomen Upper Quadrant Right (23 @ 18:53) >  FINDINGS:  Liver: Normal in size. No focal hepatic masses are identified. Periportal   edema noted.  Bile ducts: Normal caliber. Extrahepatic common bile duct measures 11 mm,   prominent allowing for patient age..  Gallbladder: Distended with gallstones and echogenic sludge. Marginal   gallbladder wall thickening noted at 3 mm.  Pancreas: Not well visualized, but pancreatic ductal dilatation is   identified.  Right kidney: 10.6 cm. No hydronephrosis. Atrophic.  Ascites: Perihepatic ascites.  IVC and aorta: Visualized portions are within normal limits.  Note made of right pleural effusion.    IMPRESSION:  Increased caliber of the extrahepatic CBD and pancreatic duct noted. The   gallbladder is distended with gallstones and echogenic sludge. Marginal   gallbladder wall thickening noted at 3 mm. Perihepatic ascites. Right   pleural effusion.        < end of copied text >   SURGERY PROGRESS NOTE    HPI:  89yo F h/o Afib (on Eliquis), HTN, dementia, CHF, L femur fx (s/p repair 2022), presenting due to weakness and altered mental status this morning. Per daughter, pt was less interactive than usual. Also reporting low back pain.     Surgery consulted to r/o biliary pathology based on CT findings. Pt found to have positive UA, reports some burning when she urinates.    At baseline, lives with daughter - used to be able to walk one block before getting tired, however since hip surgery has been deconditioned, and cannot walk anymore.     CT showing distended gallbladder, cholelithiasis and CBD 10mm. WBC 8.8. Pancreas not well visualized, but pancreatic ductal dilatation is identified.      PAST MEDICAL HISTORY:  Hypertension    Dementia        PAST SURGICAL HISTORY:      MEDICATIONS:  metoprolol succinate ER 50 milliGRAM(s) Oral daily  vancomycin  IVPB 750 milliGRAM(s) IV Intermittent Once      ALLERGIES:  No Known Allergies      VITALS & I/Os:   Vital Signs Last 24 Hrs  T(C): 36.3 (19 Mar 2023 12:29), Max: 36.8 (19 Mar 2023 03:24)  T(F): 97.4 (19 Mar 2023 12:29), Max: 98.3 (19 Mar 2023 03:24)  HR: 98 (19 Mar 2023 12:29) (83 - 151)  BP: 122/89 (19 Mar 2023 12:29) (119/108 - 142/94)  BP(mean): --  RR: 17 (19 Mar 2023 12:29) (17 - 22)  SpO2: 99% (19 Mar 2023 12:29) (95% - 100%)    Parameters below as of 19 Mar 2023 12:29  Patient On (Oxygen Delivery Method): nasal cannula  O2 Flow (L/min): 2        I&O's Summary      PHYSICAL EXAM:  GEN: resting comfortably in bed, in NAD  CHEST: no increased WOB  ABD: soft, non-distended, nontender to palpation  EXT: warm, well perfused  NEURO: A&Ox3       LABS:                           12.7   8.32  )-----------( 297      ( 19 Mar 2023 06:53 )             40.9     03-19    141  |  103  |  14  ----------------------------<  102<H>  3.6   |  23  |  0.68    Ca    9.2      19 Mar 2023 06:52  Phos  3.5       Mg     2.2         TPro  6.1  /  Alb  3.5  /  TBili  0.6  /  DBili  x   /  AST  19  /  ALT  15  /  AlkPhos  78        Urinalysis Basic - ( 18 Mar 2023 12:40 )    Color: Yellow / Appearance: Slightly Turbid / S.029 / pH: x  Gluc: x / Ketone: Negative  / Bili: Negative / Urobili: Negative   Blood: x / Protein: 30 mg/dL / Nitrite: Positive   Leuk Esterase: Large / RBC: 1 /hpf /  /HPF   Sq Epi: x / Non Sq Epi: 0 /hpf / Bacteria: Many        IMAGING:  < from: CT Abdomen and Pelvis w/ IV Cont (23 @ 12:12) >  FINDINGS:  LOWER CHEST: Moderate bilateral pleural effusions with compressive   atelectasis. Cardiomegaly. Coronary artery calcifications.    LIVER: Normal morphology. Periportal edema.  BILE DUCTS: Common bile duct measures up to 10 mm in size without   definitive intrahepaticductal dilatation or intraluminal abnormality  GALLBLADDER: Moderately distended gallbladder with layering stones and   pericholecystic fluid.  SPLEEN: Within normal limits.  PANCREAS: Bulbous appearance the pancreatic duct involving the head in   the setting of likely pancreatic divisum. Small cystic areas seen along   the lateral border  ADRENALS: Within normal limits.  KIDNEYS/URETERS: Within normal limits.    BLADDER: Within normal limits.  REPRODUCTIVE ORGANS: No mass.    BOWEL: No bowel obstruction.  PERITONEUM: Small upper abdominal ascites.  VESSELS: Significant atherosclerotic changes without aneurysm  RETROPERITONEUM/LYMPH NODES: No lymphadenopathy.  ABDOMINAL WALL: Right groin hernia containing nonobstructed bowel and   fluid.  BONES: Degenerative changes. Left hip ORIF. Scoliosis.    IMPRESSION:  Moderately distended gallbladder with small layering stones and   pericholecystic edema, equivocal for acute cholecystitis. Follow-up   nuclear medicine hepatobiliary scan is recommended.    Common bile duct measures up to 10 mm in size.    Bulbous appearance of the distal pancreatic involving the head with   findings suggestive of pancreatic divisum. Small punctate cyst is seen   without definitive solid lesion    Moderate bilateral pleural effusions with compressive atelectasis.    < end of copied text >  < from: US Abdomen Upper Quadrant Right (23 @ 18:53) >  FINDINGS:  Liver: Normal in size. No focal hepatic masses are identified. Periportal   edema noted.  Bile ducts: Normal caliber. Extrahepatic common bile duct measures 11 mm,   prominent allowing for patient age..  Gallbladder: Distended with gallstones and echogenic sludge. Marginal   gallbladder wall thickening noted at 3 mm.  Pancreas: Not well visualized, but pancreatic ductal dilatation is   identified.  Right kidney: 10.6 cm. No hydronephrosis. Atrophic.  Ascites: Perihepatic ascites.  IVC and aorta: Visualized portions are within normal limits.  Note made of right pleural effusion.    IMPRESSION:  Increased caliber of the extrahepatic CBD and pancreatic duct noted. The   gallbladder is distended with gallstones and echogenic sludge. Marginal   gallbladder wall thickening noted at 3 mm. Perihepatic ascites. Right   pleural effusion.        < end of copied text >

## 2023-03-19 NOTE — PATIENT PROFILE ADULT - CHOOSE INDICATION TO IMMUNIZE (AN ORDER WILL BE GENERATED WHEN THIS NOTE IS SAVED):
Detail Level: Detailed Add 62273 Cpt? (Important Note: In 2017 The Use Of 39801 Is Being Tracked By Cms To Determine Future Global Period Reimbursement For Global Periods): yes Patient is not pregnant (male or female)

## 2023-03-19 NOTE — PHYSICAL THERAPY INITIAL EVALUATION ADULT - PERTINENT HX OF CURRENT PROBLEM, REHAB EVAL
89yo F w/ PMH of HTN, HLD, Afib on Eliquis, HFpEF on Lasix, recent femur fx s/p repair, dementia and macular degeneration pw weakness and flank pain at home. 3/18 Pelvic x-ray: Generalized osteopenia otherwise no discrete lytic or blastic lesions. Intact unremarkable proximal left femoral IM star/nail with proximal helical blade compression screw and distal interlocking screw. Residual slight inferomedial left femoral neck calcar cortical offset. Otherwise no dislocations or acute appearing fractures in above imaged anatomic regions.

## 2023-03-20 ENCOUNTER — TRANSCRIPTION ENCOUNTER (OUTPATIENT)
Age: 88
End: 2023-03-20

## 2023-03-20 LAB — NT-PROBNP SERPL-SCNC: 7428 PG/ML — HIGH (ref 0–300)

## 2023-03-20 RX ORDER — OLANZAPINE 15 MG/1
2.5 TABLET, FILM COATED ORAL ONCE
Refills: 0 | Status: COMPLETED | OUTPATIENT
Start: 2023-03-21 | End: 2023-03-21

## 2023-03-20 RX ADMIN — Medication 50 MILLIGRAM(S): at 06:05

## 2023-03-20 RX ADMIN — ATORVASTATIN CALCIUM 10 MILLIGRAM(S): 80 TABLET, FILM COATED ORAL at 22:24

## 2023-03-20 RX ADMIN — Medication 40 MILLIGRAM(S): at 06:05

## 2023-03-20 RX ADMIN — APIXABAN 2.5 MILLIGRAM(S): 2.5 TABLET, FILM COATED ORAL at 06:05

## 2023-03-20 RX ADMIN — CEFTRIAXONE 100 MILLIGRAM(S): 500 INJECTION, POWDER, FOR SOLUTION INTRAMUSCULAR; INTRAVENOUS at 13:26

## 2023-03-20 RX ADMIN — APIXABAN 2.5 MILLIGRAM(S): 2.5 TABLET, FILM COATED ORAL at 17:53

## 2023-03-20 RX ADMIN — LISINOPRIL 20 MILLIGRAM(S): 2.5 TABLET ORAL at 06:05

## 2023-03-20 RX ADMIN — SENNA PLUS 2 TABLET(S): 8.6 TABLET ORAL at 22:24

## 2023-03-20 NOTE — PROGRESS NOTE ADULT - SUBJECTIVE AND OBJECTIVE BOX
Patient is a 89y old  Female who presents with a chief complaint of UTI (19 Mar 2023 13:54)      SUBJECTIVE / OVERNIGHT EVENTS:    Patient seen and examined. poor historian. no complaints.      Vital Signs Last 24 Hrs  T(C): 36.4 (20 Mar 2023 05:59), Max: 36.4 (20 Mar 2023 05:59)  T(F): 97.6 (20 Mar 2023 05:59), Max: 97.6 (20 Mar 2023 05:59)  HR: 93 (20 Mar 2023 05:59) (93 - 98)  BP: 152/82 (20 Mar 2023 05:59) (122/89 - 155/75)  BP(mean): --  RR: 18 (20 Mar 2023 05:59) (17 - 18)  SpO2: 95% (20 Mar 2023 05:59) (95% - 99%)    Parameters below as of 20 Mar 2023 05:59  Patient On (Oxygen Delivery Method): room air      I&O's Summary      PE:  GENERAL: NAD, AAOx1  CHEST/LUNG: CTABL, No wheeze  HEART: Regular rate and rhythm; + murmur  ABDOMEN: Soft, Nontender, Nondistended; Bowel sounds present  EXTREMITIES:  2+ Peripheral Pulses, +1 le edema  NEURO: pleasantly confused    LABS:                        12.7   8.32  )-----------( 297      ( 19 Mar 2023 06:53 )             40.9     03-19    141  |  103  |  14  ----------------------------<  102<H>  3.6   |  23  |  0.68    Ca    9.2      19 Mar 2023 06:52  Phos  3.5     03-19  Mg     2.2     03-19        CAPILLARY BLOOD GLUCOSE            Urinalysis Basic - ( 18 Mar 2023 12:40 )    Color: Yellow / Appearance: Slightly Turbid / S.029 / pH: x  Gluc: x / Ketone: Negative  / Bili: Negative / Urobili: Negative   Blood: x / Protein: 30 mg/dL / Nitrite: Positive   Leuk Esterase: Large / RBC: 1 /hpf /  /HPF   Sq Epi: x / Non Sq Epi: 0 /hpf / Bacteria: Many        RADIOLOGY & ADDITIONAL TESTS:    Imaging Personally Reviewed:  [x] YES  [ ] NO    Consultant(s) Notes Reviewed:  [x] YES  [ ] NO    MEDICATIONS  (STANDING):  apixaban 2.5 milliGRAM(s) Oral every 12 hours  atorvastatin 10 milliGRAM(s) Oral at bedtime  cefTRIAXone   IVPB 1000 milliGRAM(s) IV Intermittent every 24 hours  furosemide    Tablet 40 milliGRAM(s) Oral daily  influenza  Vaccine (HIGH DOSE) 0.7 milliLiter(s) IntraMuscular once  lisinopril 20 milliGRAM(s) Oral daily  metoprolol succinate ER 50 milliGRAM(s) Oral daily  polyethylene glycol 3350 17 Gram(s) Oral daily  senna 2 Tablet(s) Oral at bedtime    MEDICATIONS  (PRN):  acetaminophen     Tablet .. 650 milliGRAM(s) Oral every 6 hours PRN Temp greater or equal to 38C (100.4F), Mild Pain (1 - 3)      Care Discussed with Consultants/Other Providers [x] YES  [ ] NO    HEALTH ISSUES - PROBLEM Dx:  Pyelonephritis    Chronic atrial fibrillation with rapid ventricular response    Hypertension    HLD (hyperlipidemia)    Chronic heart failure with preserved ejection fraction (HFpEF)    Prophylactic measure    Imaging abnormality

## 2023-03-20 NOTE — DISCHARGE NOTE NURSING/CASE MANAGEMENT/SOCIAL WORK - NSDCPEFALRISK_GEN_ALL_CORE
For information on Fall & Injury Prevention, visit: [email or web address] OR  [email or web address] OR  https://www.cdc.gov/steadi/patient.html

## 2023-03-20 NOTE — PROGRESS NOTE ADULT - SUBJECTIVE AND OBJECTIVE BOX
[facility] DIVISION OF INFECTIOUS DISEASES  RONAL Cevallos Y. Patel, S. Shah, G. Casimir  [phone]  ([phone] - weekdays after 5pm and weekends)    Name: MARGARITA BECERRA  Age/Gender: 89y Female  MRN: [id number]    Interval History:  Patient seen and examined this morning.   Resting comfortably, no new complaints noted.  Notes reviewed. No concerning overnight events  Afebrile   Allergies: No Known Allergies      Objective:  Vitals:   T(F): 97.3 (03-20-23 @ 12:06), Max: 97.6 (03-20-23 @ 05:59)  HR: 86 (03-20-23 @ 12:06) (86 - 97)  BP: 123/68 (03-20-23 @ 12:06) (123/68 - 155/75)  RR: 18 (03-20-23 @ 12:06) (18 - 18)  SpO2: 94% (03-20-23 @ 12:06) (94% - 97%)  Physical Examination:  General: no acute distress  HEENT: NC/AT, anicteric, neck supple  Respiratory: no acc muscle use, breathing comfortably  Cardiovascular: S1 and S2 present  Gastrointestinal: normal appearing, nondistended  Extremities: LE edema, no cyanosis  Skin: no visible rash    Laboratory Studies:  CBC:                       12.7   8.32  )-----------( 297      ( 19 Mar 2023 06:53 )             40.9     WBC Trend:  8.32 03-19-23 @ 06:53  8.81 03-18-23 @ 10:04    CMP: 03-19    141  |  103  |  14  ----------------------------<  102<H>  3.6   |  23  |  0.68    Ca    9.2      19 Mar 2023 06:52  Phos  3.5     03-19  Mg     2.2     03-19    Creatinine, Serum: 0.68 mg/dL (03-19-23 @ 06:52)  Creatinine, Serum: 0.73 mg/dL (03-18-23 @ 10:04)    Microbiology: reviewed   Culture - Urine (collected 03-18-23 @ 12:40)  Source: Clean Catch Clean Catch (Midstream)  Preliminary Report (03-20-23 @ 00:02):    >100,000 CFU/ml Escherichia coli    SARS-CoV-2 Result: [facility] (18 Mar 2023 10:04)    Radiology: reviewed     Medications:  acetaminophen     Tablet .. 650 milliGRAM(s) Oral every 6 hours PRN  apixaban 2.5 milliGRAM(s) Oral every 12 hours  atorvastatin 10 milliGRAM(s) Oral at bedtime  cefTRIAXone   IVPB 1000 milliGRAM(s) IV Intermittent every 24 hours  furosemide    Tablet 40 milliGRAM(s) Oral daily  influenza  Vaccine (HIGH DOSE) 0.7 milliLiter(s) IntraMuscular once  lisinopril 20 milliGRAM(s) Oral daily  metoprolol succinate ER 50 milliGRAM(s) Oral daily  polyethylene glycol 3350 17 Gram(s) Oral daily  senna 2 Tablet(s) Oral at bedtime    Current Antimicrobials:  cefTRIAXone   IVPB 1000 milliGRAM(s) IV Intermittent every 24 hours    Prior/Completed Antimicrobials:  cefTRIAXone   IVPB  vancomycin  IVPB

## 2023-03-20 NOTE — PROGRESS NOTE ADULT - ASSESSMENT
Patient is a 89yo F w/ PMH of cognitive impairment HTN, HLD, Afib on Eliquis, HFpEF on Lasix, recent femur fx s/p vstehy75/2022, dementia and macular degeneration pw weakness and altered mental status, left flank pain  E.coli UTI/cystitis    plan  pt no fever, no leukocytosis -- although in 89 yr old, may not mount  cannot give gu history due to cognitive dysfunction  so treat for cystitis  no s/s of upper tract infection    no transaminitis  us and ct noted with gall bladder sludge  pain resolved  if any progression of symptoms can check hida    follow Ucx for E.coli sensitivities  continue on ceftriaxone 1g IV Q24h   - if E.coli sensitive--can plan to complete 5d course on 3/22    D/w Dr. Bharat Sharpe M.D.  [facility], Division of Infectious Diseases  [phone]  After 5pm on weekdays and all day on weekends - please call [phone]

## 2023-03-20 NOTE — PROGRESS NOTE ADULT - ASSESSMENT
89yo F w/ PMH of HTN, HLD, Afib on Eliquis, HFpEF on Lasix, recent femur fx s/p repair, dementia and macular degeneration pw weakness and flank pain at home    # atrial fibrillation with rapid ventricular response  -in setting of pyelonephritis  -c/w home Toprol dose  -c/w Eliquis 2.5mg BID  -Rate controlled on tele    # Chronic HFpEF  -volume status acceptable  -cont PO lasix    # Acute  Pyelonephritis  -cont IV abx per primary team  -followup cultures    # R/O acute cathryn  -CT A/P reveals moderately distended gallbladder with small layering stones and pericholecystic edema   -No Sx planned at this time  -F/u for possible HIDA scan    # Hypertension  -bp controlled  -cont BB/acei      Please call/text me with questions/concerns between 8am-4pm  [phone]

## 2023-03-20 NOTE — ADVANCED PRACTICE NURSE CONSULT - RECOMMEDATIONS
Impression:    B/L heel deep tissue injuries present on admission  B/L buttocks/sacral deep tissue injury, present on admission  B/L ischium deep tissue injuries, present on admission    Recommendations:    1) continue turning and positioning q2 and PRN utilizing offloading assistive devices  2) continue with routine pericare daily and PRN soiling  3) encourage optimal nutrition  4) waffle cushion when oob to chair  5) B/L LE complete cair air fluidized boots to offload heels/feet  6) triad protective barrier cream to B/L buttocks/sacrum/ischium daily and PRN soiling  7) incontinence management - continue external female urinary catheter to divert urine from skin if incontinent    Plan discussed with LEVI Sheth on unit

## 2023-03-20 NOTE — DISCHARGE NOTE NURSING/CASE MANAGEMENT/SOCIAL WORK - PATIENT PORTAL LINK FT
You can access the FollowMyHealth Patient Portal offered by [facility] by registering at the following website: [email or web address]. By joining [email or web address]’s FollowMyHealth portal, you will also be able to view your health information using other applications (apps) compatible with our system.

## 2023-03-20 NOTE — DISCHARGE NOTE NURSING/CASE MANAGEMENT/SOCIAL WORK - NSSCTYPOFSERV_GEN_ALL_CORE
visiting nurse, home Physical therapy, [facility] Visiting Nurse, Home Physical therapy, [facility]

## 2023-03-20 NOTE — PROGRESS NOTE ADULT - ASSESSMENT
89 year old female visited at bedside INAD but patient is confused with signs of dementia  LOWER EXTREMITY PHYSICAL EXAM:    Vascular: DP/PT 0/4, B/L, CFT < seconds B/L, Temperature gradient wnl, B/L.   Neuro: Epicritic sensation absent to the level of toes, B/L.  Musculoskeletal/Ortho: 2+ edema both feet and ankles. patient wearing allevyn pad on right heel no signs of ulcerations no signs of deep tissue injury both feet. Non-palpable pulses with no signs of ulcers and no signs of cellulitis or infections bilaterally  Recommend vascular consult for edema and non-palpable pulses  Recommend patient use prevalon boots for off loading posterior heels bilateral for ulceration prevention  reconsult podiatry as needed

## 2023-03-20 NOTE — ADVANCED PRACTICE NURSE CONSULT - REASON FOR CONSULT
Wound care consult initiated by RN to assess patient's skin for a possible pressure injury on coccyx and B/L heel deep tissue injuries, present on admission      Reason for Admission: UTI  History of Present Illness:   Pt is a 89yo F w/ PMH of HTN, HLD, Afib on Eliquis, HFpEF on Lasix, recent femur fx s/p repair, dementia and macular degeneration pw weakness and flank pain at home.    Pt unable to provide full hx, hx obtained from daughter at bedside. This AM pt was unable to get out of bed and complained of left flank pain which was new for her. She was recently discharged from rehab about 1 week prior and was undergoing PT at home w/ last session yesterday. Otherwise denies any F/C, CP, SOB, palpitations, abd pain, N/V, constipation or diarrhea.     In the ED episode of Afib RVR s/p Lopressor 5mg IVP and Toprol 50mg PO. She was also administered CTX, Vancomycin, Zyprexa for agitation.

## 2023-03-20 NOTE — PROGRESS NOTE ADULT - SUBJECTIVE AND OBJECTIVE BOX
Patient is a 89y old  Female who presents with a chief complaint of UTI (20 Mar 2023 13:20)      HPI:  Pt is a 89yo F w/ PMH of HTN, HLD, Afib on Eliquis, HFpEF on Lasix, recent femur fx s/p repair, dementia and macular degeneration pw weakness and flank pain at home.    Pt unable to provide full hx, hx obtained from daughter at bedside. This AM pt was unable to get out of bed and complained of left flank pain which was new for her. She was recently discharged from rehab about 1 week prior and was undergoing PT at home w/ last session yesterday. Otherwise denies any F/C, CP, SOB, palpitations, abd pain, N/V, constipation or diarrhea.     In the ED episode of Afib RVR s/p Lopressor 5mg IVP and Toprol 50mg PO. She was also administered CTX, Vancomycin, Zyprexa for agitation.    (18 Mar 2023 23:26)      PAST MEDICAL & SURGICAL HISTORY:  Hypertension      Dementia      HLD (hyperlipidemia)      Chronic heart failure with preserved ejection fraction (HFpEF)      Chronic atrial fibrillation      Fx intertrochanteric hip          MEDICATIONS  (STANDING):  apixaban 2.5 milliGRAM(s) Oral every 12 hours  atorvastatin 10 milliGRAM(s) Oral at bedtime  cefTRIAXone   IVPB 1000 milliGRAM(s) IV Intermittent every 24 hours  furosemide    Tablet 40 milliGRAM(s) Oral daily  influenza  Vaccine (HIGH DOSE) 0.7 milliLiter(s) IntraMuscular once  lisinopril 20 milliGRAM(s) Oral daily  metoprolol succinate ER 50 milliGRAM(s) Oral daily  polyethylene glycol 3350 17 Gram(s) Oral daily  senna 2 Tablet(s) Oral at bedtime    MEDICATIONS  (PRN):  acetaminophen     Tablet .. 650 milliGRAM(s) Oral every 6 hours PRN Temp greater or equal to 38C (100.4F), Mild Pain (1 - 3)      Allergies    No Known Allergies    Intolerances        VITALS:    Vital Signs Last 24 Hrs  T(C): 36.3 (20 Mar 2023 12:06), Max: 36.4 (20 Mar 2023 05:59)  T(F): 97.3 (20 Mar 2023 12:06), Max: 97.6 (20 Mar 2023 05:59)  HR: 86 (20 Mar 2023 12:06) (86 - 97)  BP: 123/68 (20 Mar 2023 12:06) (123/68 - 155/75)  BP(mean): --  RR: 18 (20 Mar 2023 12:06) (18 - 18)  SpO2: 94% (20 Mar 2023 12:06) (94% - 97%)    Parameters below as of 20 Mar 2023 12:06  Patient On (Oxygen Delivery Method): room air        LABS:                          12.7   8.32  )-----------( 297      ( 19 Mar 2023 06:53 )             40.9       03-19    141  |  103  |  14  ----------------------------<  102<H>  3.6   |  23  |  0.68    Ca    9.2      19 Mar 2023 06:52  Phos  3.5     03-19  Mg     2.2     03-19        CAPILLARY BLOOD GLUCOSE              LOWER EXTREMITY PHYSICAL EXAM:    Vascular: DP/PT 0/4, B/L, CFT < seconds B/L, Temperature gradient wnl, B/L.   Neuro: Epicritic sensation absent to the level of toes, B/L.  Musculoskeletal/Ortho: 2+ edema both feet and ankles. patient wearing allevyn pad on right heel no signs of ulcerations no signs of deep tissue injury both feet. Non-palpable ulcers with no signs of cellulitis or infections bilaterally

## 2023-03-20 NOTE — ADVANCED PRACTICE NURSE CONSULT - ASSESSMENT
When wound care RN arrived on unit, patient was found lying in a low air loss pressure redistribution support surface style bed while eating breakfast. Patient was alert and oriented and gave consent to skin consult. This patient is able to turn with staff assistance x 1, provided. Once turned, wound care RN was able to visualize an area of persistent nonblanchable deep maroon discoloration with purple hues noted over B/L buttocks/sacral skin, area measures approximately 9cm x 7cm x 0cm. Similar maroon discoloration is noted on B/L ischium, area measures approximately 2cm x 2cm x 0cm. On B/L heels maroon discoloration noted to measure approximately 4cm x 4cm x 0cm, bilaterally. Once consult was complete, patient was educated regarding the need for routine turning and positioning to prevent pressure injuries and patient was placed in an upright fashion in order to continue eating breakfast.

## 2023-03-20 NOTE — PROGRESS NOTE ADULT - ASSESSMENT
89yo F w/ PMH of HTN, HLD, Afib on Eliquis, HFpEF on Lasix, recent femur fx s/p repair, dementia and macular degeneration pw weakness and flank pain at home    # Pyelonephritis/UTI  CTX 1g qd   urine cx ecoli, fu sens, ID following  PT   doppler le ro dvt    # Chronic atrial fibrillation with rapid ventricular response  c/w Eliquis 2.5mg BID  c/w home Toprol 50mg qd  cardio following    # Imaging abnormality  Moderately distended gallbladder with small layering stones and pericholecystic edema noted on CT A/P  Surgery suspects unlikely acute cathryn, No acute surgical intervention.  pt benign abd exam    # Chronic heart failure with preserved ejection fraction (HFpEF)  No s/s of acute exacerbation  c/w Lasix 40mg qd w/ hold parameters    # Hypertension  c/w home Ramipril equivalent w/ hold parameters    # HLD  c/w home Atorvastatin 10mg qhs    DVT ppx: On Eliquis    dispo doppler LE, fu urine sens    [facility] with questions [phone].

## 2023-03-20 NOTE — PROGRESS NOTE ADULT - SUBJECTIVE AND OBJECTIVE BOX
CARDIOLOGY FOLLOW UP - Dr. Escudero  DATE OF SERVICE: 3/20/23    CC  No CV complaints    REVIEW OF SYSTEMS:  CONSTITUTIONAL: No fever, weight loss, or fatigue  RESPIRATORY: No cough, wheezing, chills or hemoptysis; No Shortness of Breath  CARDIOVASCULAR: No chest pain, palpitations, passing out, dizziness, or leg swelling  GASTROINTESTINAL: No abdominal or epigastric pain. No nausea, vomiting, or hematemesis; No diarrhea or constipation. No melena or hematochezia.  VASCULAR: No edema     PHYSICAL EXAM:  T(C): 36.3 (03-20-23 @ 12:06), Max: 36.4 (03-20-23 @ 05:59)  HR: 86 (03-20-23 @ 12:06) (86 - 97)  BP: 123/68 (03-20-23 @ 12:06) (123/68 - 155/75)  RR: 18 (03-20-23 @ 12:06) (18 - 18)  SpO2: 94% (03-20-23 @ 12:06) (94% - 97%)  Wt(kg): --  I&O's Summary      Appearance: Elderly female 	  Cardiovascular: Normal S1 S2,RRR, No JVD, No murmurs  Respiratory: Lungs clear to auscultation	  Gastrointestinal:  Soft, Non-tender, + BS	  Extremities: Normal range of motion, No clubbing, cyanosis or edema      Home Medications:  acetaminophen 325 mg oral tablet: 3 tab(s) orally every 8 hours (18 Mar 2023 23:27)  atorvastatin 10 mg oral tablet: 1 tab(s) orally once a day (at bedtime) (18 Mar 2023 23:27)  Eliquis 2.5 mg oral tablet: 1 tab(s) orally every 12 hours (18 Mar 2023 23:27)  furosemide 40 mg oral tablet: 1 tab(s) orally once a day (18 Mar 2023 23:27)  Metoprolol Succinate ER 50 mg oral tablet, extended release: 1 tab(s) orally once a day (18 Mar 2023 23:27)  polyethylene glycol 3350 oral powder for reconstitution: 17 gram(s) orally once a day (18 Mar 2023 23:27)  ramipril 5 mg oral tablet: 1 tab(s) orally 2 times a day (18 Mar 2023 23:27)  senna leaf extract oral tablet: 2 tab(s) orally once a day (at bedtime) (18 Mar 2023 23:27)      MEDICATIONS  (STANDING):  apixaban 2.5 milliGRAM(s) Oral every 12 hours  atorvastatin 10 milliGRAM(s) Oral at bedtime  cefTRIAXone   IVPB 1000 milliGRAM(s) IV Intermittent every 24 hours  furosemide    Tablet 40 milliGRAM(s) Oral daily  influenza  Vaccine (HIGH DOSE) 0.7 milliLiter(s) IntraMuscular once  lisinopril 20 milliGRAM(s) Oral daily  metoprolol succinate ER 50 milliGRAM(s) Oral daily  polyethylene glycol 3350 17 Gram(s) Oral daily  senna 2 Tablet(s) Oral at bedtime      TELEMETRY: Afib     ECG:  	  RADIOLOGY:   DIAGNOSTIC TESTING:  [ ] Echocardiogram:  [ ]  Catheterization:  [ ] Stress Test:    OTHER: 	    LABS:	 	                            12.7   8.32  )-----------( 297      ( 19 Mar 2023 06:53 )             40.9     03-19    141  |  103  |  14  ----------------------------<  102<H>  3.6   |  23  |  0.68    Ca    9.2      19 Mar 2023 06:52  Phos  3.5     03-19  Mg     2.2     03-19

## 2023-03-21 LAB
-  AMIKACIN: SIGNIFICANT CHANGE UP
-  AMOXICILLIN/CLAVULANIC ACID: SIGNIFICANT CHANGE UP
-  AMPICILLIN/SULBACTAM: SIGNIFICANT CHANGE UP
-  AMPICILLIN: SIGNIFICANT CHANGE UP
-  AZTREONAM: SIGNIFICANT CHANGE UP
-  CEFAZOLIN: SIGNIFICANT CHANGE UP
-  CEFEPIME: SIGNIFICANT CHANGE UP
-  CEFTRIAXONE: SIGNIFICANT CHANGE UP
-  CEFUROXIME: SIGNIFICANT CHANGE UP
-  CIPROFLOXACIN: SIGNIFICANT CHANGE UP
-  ERTAPENEM: SIGNIFICANT CHANGE UP
-  GENTAMICIN: SIGNIFICANT CHANGE UP
-  IMIPENEM: SIGNIFICANT CHANGE UP
-  LEVOFLOXACIN: SIGNIFICANT CHANGE UP
-  MEROPENEM: SIGNIFICANT CHANGE UP
-  NITROFURANTOIN: SIGNIFICANT CHANGE UP
-  PIPERACILLIN/TAZOBACTAM: SIGNIFICANT CHANGE UP
-  TOBRAMYCIN: SIGNIFICANT CHANGE UP
-  TRIMETHOPRIM/SULFAMETHOXAZOLE: SIGNIFICANT CHANGE UP
ANION GAP SERPL CALC-SCNC: 11 MMOL/L — SIGNIFICANT CHANGE UP (ref 5–17)
BUN SERPL-MCNC: 14 MG/DL — SIGNIFICANT CHANGE UP (ref 7–23)
CALCIUM SERPL-MCNC: 9.3 MG/DL — SIGNIFICANT CHANGE UP (ref 8.4–10.5)
CHLORIDE SERPL-SCNC: 107 MMOL/L — SIGNIFICANT CHANGE UP (ref 96–108)
CO2 SERPL-SCNC: 26 MMOL/L — SIGNIFICANT CHANGE UP (ref 22–31)
CREAT SERPL-MCNC: 0.73 MG/DL — SIGNIFICANT CHANGE UP (ref 0.5–1.3)
CULTURE RESULTS: SIGNIFICANT CHANGE UP
EGFR: 77 ML/MIN/1.73M2 — SIGNIFICANT CHANGE UP
GLUCOSE SERPL-MCNC: 91 MG/DL — SIGNIFICANT CHANGE UP (ref 70–99)
METHOD TYPE: SIGNIFICANT CHANGE UP
NT-PROBNP SERPL-SCNC: 6889 PG/ML — HIGH (ref 0–300)
ORGANISM # SPEC MICROSCOPIC CNT: SIGNIFICANT CHANGE UP
ORGANISM # SPEC MICROSCOPIC CNT: SIGNIFICANT CHANGE UP
POTASSIUM SERPL-MCNC: 3.4 MMOL/L — LOW (ref 3.5–5.3)
POTASSIUM SERPL-SCNC: 3.4 MMOL/L — LOW (ref 3.5–5.3)
SODIUM SERPL-SCNC: 144 MMOL/L — SIGNIFICANT CHANGE UP (ref 135–145)
SPECIMEN SOURCE: SIGNIFICANT CHANGE UP

## 2023-03-21 PROCEDURE — 72170 X-RAY EXAM OF PELVIS: CPT | Mod: 26

## 2023-03-21 PROCEDURE — 93970 EXTREMITY STUDY: CPT | Mod: 26

## 2023-03-21 PROCEDURE — 73590 X-RAY EXAM OF LOWER LEG: CPT | Mod: 26,50

## 2023-03-21 RX ORDER — LANOLIN ALCOHOL/MO/W.PET/CERES
1 CREAM (GRAM) TOPICAL ONCE
Refills: 0 | Status: DISCONTINUED | OUTPATIENT
Start: 2023-03-21 | End: 2023-03-22

## 2023-03-21 RX ORDER — OLANZAPINE 15 MG/1
2.5 TABLET, FILM COATED ORAL ONCE
Refills: 0 | Status: COMPLETED | OUTPATIENT
Start: 2023-03-21 | End: 2023-03-21

## 2023-03-21 RX ORDER — ERTAPENEM SODIUM 1 G/1
1000 INJECTION, POWDER, LYOPHILIZED, FOR SOLUTION INTRAMUSCULAR; INTRAVENOUS EVERY 24 HOURS
Refills: 0 | Status: DISCONTINUED | OUTPATIENT
Start: 2023-03-21 | End: 2023-03-22

## 2023-03-21 RX ADMIN — Medication 50 MILLIGRAM(S): at 05:26

## 2023-03-21 RX ADMIN — ERTAPENEM SODIUM 120 MILLIGRAM(S): 1 INJECTION, POWDER, LYOPHILIZED, FOR SOLUTION INTRAMUSCULAR; INTRAVENOUS at 13:11

## 2023-03-21 RX ADMIN — APIXABAN 2.5 MILLIGRAM(S): 2.5 TABLET, FILM COATED ORAL at 05:25

## 2023-03-21 RX ADMIN — OLANZAPINE 2.5 MILLIGRAM(S): 15 TABLET, FILM COATED ORAL at 09:46

## 2023-03-21 RX ADMIN — SENNA PLUS 2 TABLET(S): 8.6 TABLET ORAL at 22:16

## 2023-03-21 RX ADMIN — Medication 40 MILLIGRAM(S): at 05:25

## 2023-03-21 RX ADMIN — APIXABAN 2.5 MILLIGRAM(S): 2.5 TABLET, FILM COATED ORAL at 18:05

## 2023-03-21 RX ADMIN — LISINOPRIL 20 MILLIGRAM(S): 2.5 TABLET ORAL at 05:25

## 2023-03-21 RX ADMIN — POLYETHYLENE GLYCOL 3350 17 GRAM(S): 17 POWDER, FOR SOLUTION ORAL at 22:16

## 2023-03-21 RX ADMIN — ATORVASTATIN CALCIUM 10 MILLIGRAM(S): 80 TABLET, FILM COATED ORAL at 22:15

## 2023-03-21 NOTE — PROGRESS NOTE ADULT - ASSESSMENT
89yo F w/ PMH of HTN, HLD, Afib on Eliquis, HFpEF on Lasix, recent femur fx s/p repair, dementia and macular degeneration pw weakness and flank pain at home    # atrial fibrillation with rapid ventricular response  -in setting of pyelonephritis  -c/w home Toprol dose  -c/w Eliquis 2.5mg BID  -Rate controlled on tele    # Chronic HFpEF  -volume status acceptable  -cont PO lasix    # Acute  Pyelonephritis  -cont IV abx per primary team  -followup cultures    # R/O acute cathryn  -CT A/P reveals moderately distended gallbladder with small layering stones and pericholecystic edema   -No Sx planned at this time    # Hypertension  -bp controlled  -cont BB/acei      Please call/text me with questions/concerns between 8am-4pm  [phone]

## 2023-03-21 NOTE — PROGRESS NOTE ADULT - SUBJECTIVE AND OBJECTIVE BOX
[facility] DIVISION OF INFECTIOUS DISEASES  RONAL Cevallos Y. Patel, S. Shah, G. Casimir  [phone]  ([phone] - weekdays after 5pm and weekends)    Name: MARGARITA BECERRA  Age/Gender: 89y Female  MRN: [id number]    Interval History:  Patient had a fall overnight, does not recall.  Poor historian, no pain or complaints noted  Notes reviewed. Afebrile   Allergies: No Known Allergies      Objective:  Vitals:   T(F): 97.4 (03-21-23 @ 04:56), Max: 98.4 (03-20-23 @ 20:31)  HR: 91 (03-21-23 @ 04:56) (86 - 112)  BP: 131/76 (03-21-23 @ 04:56) (113/74 - 136/78)  RR: 18 (03-21-23 @ 04:56) (18 - 18)  SpO2: 92% (03-21-23 @ 04:56) (92% - 97%)  Physical Examination:  General: no acute distress  HEENT: NC/AT, anicteric, neck supple  Respiratory: no acc muscle use, breathing comfortably  Cardiovascular: S1 and S2 present  Gastrointestinal: normal appearing, nondistended  Extremities: LE edema, no cyanosis  Skin: no visible rash    Laboratory Studies:  CBC:   WBC Trend:  8.32 03-19-23 @ 06:53  8.81 03-18-23 @ 10:04    CMP: 03-21    144  |  107  |  14  ----------------------------<  91  3.4<L>   |  26  |  0.73    Ca    9.3      21 Mar 2023 06:54    Creatinine, Serum: 0.73 mg/dL (03-21-23 @ 06:54)  Creatinine, Serum: 0.68 mg/dL (03-19-23 @ 06:52)  Creatinine, Serum: 0.73 mg/dL (03-18-23 @ 10:04)    Microbiology: reviewed   Culture - Urine (collected 03-18-23 @ 12:40)  Source: Clean Catch Clean Catch (Midstream)  Final Report (03-21-23 @ 09:47):    >100,000 CFU/ml Escherichia coli ESBL  Organism: Escherichia coli ESBL (03-21-23 @ 09:47)  Organism: Escherichia coli ESBL (03-21-23 @ 09:47)      -  Amikacin: S <=16      -  Amoxicillin/Clavulanic Acid: S <=8/4 Consider reserving for cystitis when ampicillin/sulbactam is resistant      -  Ampicillin: R >16 These ampicillin results predict results for amoxicillin      -  Ampicillin/Sulbactam: R >16/8 Enterobacter, Klebsiella aerogenes, Citrobacter, and Serratia may develop resistance during prolonged therapy (3-4 days)      -  Aztreonam: R >16      -  Cefazolin: R >16 For uncomplicated UTI with K. pneumoniae, E. coli, or P. mirablis: IRVING <=16 is sensitive and IRVING >=32 is resistant. This also predicts results for oral agents cefaclor, cefdinir, cefpodoxime, cefprozil, cefuroxime axetil, cephalexin and locarbef for uncomplicated UTI. Note that some isolates may be susceptible to these agents while testing resistant to cefazolin.      -  Cefepime: R >16      -  Ceftriaxone: R >32 Enterobacter, Klebsiella aerogenes, Citrobacter, and Serratia may develop resistance during prolonged therapy      -  Cefuroxime: R >16      -  Ciprofloxacin: R >2      -  Ertapenem: S <=0.5      -  Gentamicin: S <=2      -  Imipenem: S <=1      -  Levofloxacin: R >4      -  Meropenem: S <=1      -  Nitrofurantoin: S <=32 Should not be used to treat pyelonephritis      -  Piperacillin/Tazobactam: S <=8      -  Tobramycin: S <=2      -  Trimethoprim/Sulfamethoxazole: S <=0.5/9.5      Method Type: IRVING    [facility]-2 [facility] (18 Mar 2023 10:04)    Radiology: reviewed     Medications:  acetaminophen     Tablet .. 650 milliGRAM(s) Oral every 6 hours PRN  apixaban 2.5 milliGRAM(s) Oral every 12 hours  atorvastatin 10 milliGRAM(s) Oral at bedtime  cefTRIAXone   IVPB 1000 milliGRAM(s) IV Intermittent every 24 hours  furosemide    Tablet 40 milliGRAM(s) Oral daily  influenza  Vaccine (HIGH DOSE) 0.7 milliLiter(s) IntraMuscular once  lisinopril 20 milliGRAM(s) Oral daily  metoprolol succinate ER 50 milliGRAM(s) Oral daily  polyethylene glycol 3350 17 Gram(s) Oral daily  senna 2 Tablet(s) Oral at bedtime    Current Antimicrobials:  cefTRIAXone   IVPB 1000 milliGRAM(s) IV Intermittent every 24 hours    Prior/Completed Antimicrobials:  cefTRIAXone   IVPB  vancomycin  IVPB

## 2023-03-21 NOTE — PROGRESS NOTE ADULT - SUBJECTIVE AND OBJECTIVE BOX
CARDIOLOGY FOLLOW UP - Dr. Escudero  DATE OF SERVICE: 3/21/23    CC  No CV complaints    REVIEW OF SYSTEMS:  CONSTITUTIONAL: No fever, weight loss, or fatigue  RESPIRATORY: No cough, wheezing, chills or hemoptysis; No Shortness of Breath  CARDIOVASCULAR: No chest pain, palpitations, passing out, dizziness, or leg swelling  GASTROINTESTINAL: No abdominal or epigastric pain. No nausea, vomiting, or hematemesis; No diarrhea or constipation. No melena or hematochezia.  VASCULAR: No edema     PHYSICAL EXAM:  T(C): 36.2 (03-21-23 @ 11:52), Max: 36.9 (03-20-23 @ 20:31)  HR: 91 (03-21-23 @ 11:52) (91 - 112)  BP: 136/78 (03-21-23 @ 11:52) (113/74 - 136/78)  RR: 18 (03-21-23 @ 11:52) (18 - 18)  SpO2: 97% (03-21-23 @ 11:52) (92% - 97%)  Wt(kg): --  I&O's Summary    20 Mar 2023 07:01  -  21 Mar 2023 07:00  --------------------------------------------------------  IN: 100 mL / OUT: 700 mL / NET: -600 mL    21 Mar 2023 07:01  -  21 Mar 2023 15:08  --------------------------------------------------------  IN: 120 mL / OUT: 0 mL / NET: 120 mL        Appearance: Normal	  Cardiovascular: Normal S1 S2,RRR, No JVD, No murmurs  Respiratory: Lungs clear to auscultation	  Gastrointestinal:  Soft, Non-tender, + BS	  Extremities: Normal range of motion, No clubbing, cyanosis or edema      Home Medications:  acetaminophen 325 mg oral tablet: 3 tab(s) orally every 8 hours (18 Mar 2023 23:27)  atorvastatin 10 mg oral tablet: 1 tab(s) orally once a day (at bedtime) (18 Mar 2023 23:27)  Eliquis 2.5 mg oral tablet: 1 tab(s) orally every 12 hours (18 Mar 2023 23:27)  furosemide 40 mg oral tablet: 1 tab(s) orally once a day (18 Mar 2023 23:27)  Metoprolol Succinate ER 50 mg oral tablet, extended release: 1 tab(s) orally once a day (18 Mar 2023 23:27)  polyethylene glycol 3350 oral powder for reconstitution: 17 gram(s) orally once a day (18 Mar 2023 23:27)  ramipril 5 mg oral tablet: 1 tab(s) orally 2 times a day (18 Mar 2023 23:27)  senna leaf extract oral tablet: 2 tab(s) orally once a day (at bedtime) (18 Mar 2023 23:27)      MEDICATIONS  (STANDING):  apixaban 2.5 milliGRAM(s) Oral every 12 hours  atorvastatin 10 milliGRAM(s) Oral at bedtime  ertapenem  IVPB 1000 milliGRAM(s) IV Intermittent every 24 hours  furosemide    Tablet 40 milliGRAM(s) Oral daily  influenza  Vaccine (HIGH DOSE) 0.7 milliLiter(s) IntraMuscular once  lisinopril 20 milliGRAM(s) Oral daily  metoprolol succinate ER 50 milliGRAM(s) Oral daily  polyethylene glycol 3350 17 Gram(s) Oral daily  senna 2 Tablet(s) Oral at bedtime      TELEMETRY: SR 80-90s    ECG:  	  RADIOLOGY:   DIAGNOSTIC TESTING:  [ ] Echocardiogram:  [ ]  Catheterization:  [ ] Stress Test:    OTHER: 	    LABS:	 	        03-21    144  |  107  |  14  ----------------------------<  91  3.4<L>   |  26  |  0.73    Ca    9.3      21 Mar 2023 06:54

## 2023-03-21 NOTE — PROGRESS NOTE ADULT - SUBJECTIVE AND OBJECTIVE BOX
Patient is a 89y old  Female who presents with a chief complaint of UTI (20 Mar 2023 14:28)      SUBJECTIVE / OVERNIGHT EVENTS:    Patient seen and examined. overnight, pt had fall, found on the floor. pt poor historian. denies complaints.      Vital Signs Last 24 Hrs  T(C): 36.3 (21 Mar 2023 04:56), Max: 36.9 (20 Mar 2023 20:31)  T(F): 97.4 (21 Mar 2023 04:56), Max: 98.4 (20 Mar 2023 20:31)  HR: 91 (21 Mar 2023 04:56) (86 - 112)  BP: 131/76 (21 Mar 2023 04:56) (113/74 - 152/82)  BP(mean): --  RR: 18 (21 Mar 2023 04:56) (18 - 18)  SpO2: 92% (21 Mar 2023 04:56) (92% - 97%)    Parameters below as of 21 Mar 2023 04:56  Patient On (Oxygen Delivery Method): room air      I&O's Summary    20 Mar 2023 07:01  -  21 Mar 2023 05:12  --------------------------------------------------------  IN: 100 mL / OUT: 700 mL / NET: -600 mL        PE:  GENERAL: NAD, AAOx3  HEAD:  Atraumatic, Normocephalic  EYES: EOMI, PERRLA, conjunctiva and sclera clear  NECK: Supple, No JVD  CHEST/LUNG: CTABL, No wheeze  HEART: Regular rate and rhythm; + murmur  ABDOMEN: Soft, Nontender, Nondistended; Bowel sounds present  EXTREMITIES:  2+ Peripheral Pulses, No clubbing, cyanosis, or edema  SKIN: No rashes or lesions  NEURO: No focal deficits    LABS:                        12.7   8.32  )-----------( 297      ( 19 Mar 2023 06:53 )             40.9     03-19    141  |  103  |  14  ----------------------------<  102<H>  3.6   |  23  |  0.68    Ca    9.2      19 Mar 2023 06:52  Phos  3.5     03-19  Mg     2.2     03-19        CAPILLARY BLOOD GLUCOSE                RADIOLOGY & ADDITIONAL TESTS:    Imaging Personally Reviewed:  [x] YES  [ ] NO    Consultant(s) Notes Reviewed:  [x] YES  [ ] NO    MEDICATIONS  (STANDING):  apixaban 2.5 milliGRAM(s) Oral every 12 hours  atorvastatin 10 milliGRAM(s) Oral at bedtime  cefTRIAXone   IVPB 1000 milliGRAM(s) IV Intermittent every 24 hours  furosemide    Tablet 40 milliGRAM(s) Oral daily  influenza  Vaccine (HIGH DOSE) 0.7 milliLiter(s) IntraMuscular once  lisinopril 20 milliGRAM(s) Oral daily  metoprolol succinate ER 50 milliGRAM(s) Oral daily  OLANZapine 2.5 milliGRAM(s) Oral once  polyethylene glycol 3350 17 Gram(s) Oral daily  senna 2 Tablet(s) Oral at bedtime    MEDICATIONS  (PRN):  acetaminophen     Tablet .. 650 milliGRAM(s) Oral every 6 hours PRN Temp greater or equal to 38C (100.4F), Mild Pain (1 - 3)      Care Discussed with Consultants/Other Providers [x] YES  [ ] NO    HEALTH ISSUES - PROBLEM Dx:  Pyelonephritis    Chronic atrial fibrillation with rapid ventricular response    Hypertension    HLD (hyperlipidemia)    Chronic heart failure with preserved ejection fraction (HFpEF)    Prophylactic measure    Imaging abnormality         Patient is a 89y old  Female who presents with a chief complaint of UTI (20 Mar 2023 14:28)      SUBJECTIVE / OVERNIGHT EVENTS:    Patient seen and examined. overnight, pt had fall, found on the floor. pt poor historian. denies complaints. does not remember falling. denies pain.      Vital Signs Last 24 Hrs  T(C): 36.3 (21 Mar 2023 04:56), Max: 36.9 (20 Mar 2023 20:31)  T(F): 97.4 (21 Mar 2023 04:56), Max: 98.4 (20 Mar 2023 20:31)  HR: 91 (21 Mar 2023 04:56) (86 - 112)  BP: 131/76 (21 Mar 2023 04:56) (113/74 - 152/82)  BP(mean): --  RR: 18 (21 Mar 2023 04:56) (18 - 18)  SpO2: 92% (21 Mar 2023 04:56) (92% - 97%)    Parameters below as of 21 Mar 2023 04:56  Patient On (Oxygen Delivery Method): room air      I&O's Summary    20 Mar 2023 07:01  -  21 Mar 2023 05:12  --------------------------------------------------------  IN: 100 mL / OUT: 700 mL / NET: -600 mL        PE:  GENERAL: NAD, AAOx1  CHEST/LUNG: CTABL, No wheeze  HEART: Regular rate and rhythm; + murmur  ABDOMEN: Soft, Nontender, Nondistended; Bowel sounds present  EXTREMITIES:  2+ Peripheral Pulses, +1 le edema, non tender  NEURO: pleasantly confused  SKIN no echymosis    LABS:                        12.7   8.32  )-----------( 297      ( 19 Mar 2023 06:53 )             40.9     03-19    141  |  103  |  14  ----------------------------<  102<H>  3.6   |  23  |  0.68    Ca    9.2      19 Mar 2023 06:52  Phos  3.5     03-19  Mg     2.2     03-19        CAPILLARY BLOOD GLUCOSE                RADIOLOGY & ADDITIONAL TESTS:    Imaging Personally Reviewed:  [x] YES  [ ] NO    Consultant(s) Notes Reviewed:  [x] YES  [ ] NO    MEDICATIONS  (STANDING):  apixaban 2.5 milliGRAM(s) Oral every 12 hours  atorvastatin 10 milliGRAM(s) Oral at bedtime  cefTRIAXone   IVPB 1000 milliGRAM(s) IV Intermittent every 24 hours  furosemide    Tablet 40 milliGRAM(s) Oral daily  influenza  Vaccine (HIGH DOSE) 0.7 milliLiter(s) IntraMuscular once  lisinopril 20 milliGRAM(s) Oral daily  metoprolol succinate ER 50 milliGRAM(s) Oral daily  OLANZapine 2.5 milliGRAM(s) Oral once  polyethylene glycol 3350 17 Gram(s) Oral daily  senna 2 Tablet(s) Oral at bedtime    MEDICATIONS  (PRN):  acetaminophen     Tablet .. 650 milliGRAM(s) Oral every 6 hours PRN Temp greater or equal to 38C (100.4F), Mild Pain (1 - 3)      Care Discussed with Consultants/Other Providers [x] YES  [ ] NO    HEALTH ISSUES - PROBLEM Dx:  Pyelonephritis    Chronic atrial fibrillation with rapid ventricular response    Hypertension    HLD (hyperlipidemia)    Chronic heart failure with preserved ejection fraction (HFpEF)    Prophylactic measure    Imaging abnormality

## 2023-03-21 NOTE — PROGRESS NOTE ADULT - ASSESSMENT
Patient is a 89yo F w/ PMH of cognitive impairment HTN, HLD, Afib on Eliquis, HFpEF on Lasix, recent femur fx s/p [id number], dementia and macular degeneration pw weakness and altered mental status, left flank pain  ESBL E.coli UTI/pyelonephritis     plan  pt no fever, no leukocytosis -- although in 89 yr old, may not mount  cannot give gu history due to cognitive dysfunction  so treat for cystitis  no s/s of upper tract infection  us and ct noted with gall bladder sludge  pain resolved, no transaminitis  if any progression of symptoms can check hida    Ucx with ESBL E.coli, sensitivities reviewed  Discontinued ceftriaxone   Start on ertapenem 1g IV Q24h --can plan to complete 7d course on 3/22   - if discharged, can plan to change to bactrim po    D/w Dr. Bharat Sharpe M.D.  [facility], Division of Infectious Diseases  [phone]  After 5pm on weekdays and all day on weekends - please call [phone]     Patient is a 89yo F w/ PMH of cognitive impairment HTN, HLD, Afib on Eliquis, HFpEF on Lasix, recent femur fx s/p [id number], dementia and macular degeneration pw weakness and altered mental status, left flank pain  ESBL E.coli UTI/pyelonephritis     plan  pt no fever, no leukocytosis -- although in 89 yr old, may not mount  cannot give gu history due to cognitive dysfunction  so treat for cystitis/pyelo given flank pain  no s/s of upper tract infection  us and ct noted with gall bladder sludge  pain resolved, no transaminitis  if any progression of symptoms can check hida    Ucx with ESBL E.coli, sensitivities reviewed  Discontinued ceftriaxone   Start on ertapenem 1g IV Q24h --can plan to complete 7d course on 3/22   - if discharged, can plan to change to bactrim DS 1 tab PO BID    D/w Dr. Bharat Sharpe M.D.  [facility], Division of Infectious Diseases  [phone]  After 5pm on weekdays and all day on weekends - please call [phone]     Patient is a 89yo F w/ PMH of cognitive impairment HTN, HLD, Afib on Eliquis, HFpEF on Lasix, recent femur fx s/p [id number], dementia and macular degeneration pw weakness and altered mental status, left flank pain  ESBL E.coli UTI/pyelonephritis     plan  pt no fever, no leukocytosis -- although in 89 yr old, may not mount  cannot give gu history due to cognitive dysfunction  so treat for cystitis/pyelo given flank pain  no s/s of upper tract infection  us and ct noted with gall bladder sludge  pain resolved, no transaminitis  if any progression of symptoms can check hida    Ucx with ESBL E.coli, sensitivities reviewed  Discontinued ceftriaxone   Start on ertapenem 1g IV Q24h --can plan to complete 7d course on 3/27   - if discharged, can plan to change to bactrim DS 1 tab PO BID    D/w Dr. Bharat Sharpe M.D.  [facility], Division of Infectious Diseases  [phone]  After 5pm on weekdays and all day on weekends - please call [phone]

## 2023-03-21 NOTE — PROGRESS NOTE ADULT - ASSESSMENT
89yo F w/ PMH of HTN, HLD, Afib on Eliquis, HFpEF on Lasix, recent femur fx s/p repair, dementia and macular degeneration pw weakness and flank pain at home.    # Pyelonephritis/UTI  CTX 1g qd   urine cx ecoli, fu sens, ID following  PT     # dementia / delirium  frequent orientation  fall risk  sp fall overnight, did not comply with XR   cannot give seroquel or IV haldol  give olanzapine 2.5 x 1 prior to XR  if still agitated, can try depakote 125 IV x 1     # Chronic atrial fibrillation with rapid ventricular response  c/w Eliquis 2.5mg BID  c/w home Toprol 50mg qd  cardio following    # Chronic atrial fibrillation with rapid ventricular response  # Chronic heart failure with preserved ejection fraction (HFpEF)  # Hypertension  # HLD  c/w Eliquis 2.5mg BID  c/w home Toprol 50mg qd  No s/s of acute exacerbation  doppler le ro dvt  c/w home Ramipril equivalent w/ hold parameters  c/w home Atorvastatin 10mg qhs    DVT ppx: On Eliquis    dispo doppler LE, fu urine sens, XR    Please call [facility] with questions [phone].

## 2023-03-21 NOTE — CHART NOTE - NSCHARTNOTEFT_GEN_A_CORE
MEDICINE PA     Post Fall Assessment    89yo F w/ PMH of HTN, HLD, Afib on Eliquis, HFpEF on Lasix, recent femur fx s/p repair, dementia and macular degeneration pw weakness and flank pain at home.  Pt reported hearing the bed alarm before midnight, and found pt at the side of the bed on the floor seated on both knees and ankles  Per RN, pt told him that she stood up to shut the door.   Pt helped back to bed, found to be asleep  Pt with known dementia, became agitated and started to scream  that she wanted to be left alone, when provider approached pt to speak with her and examine her.          Vital Signs Last 24 Hrs  T(C): 36.4 (20 Mar 2023 23:30), Max: 36.9 (20 Mar 2023 20:31)  T(F): 97.5 (20 Mar 2023 23:30), Max: 98.4 (20 Mar 2023 20:31)  HR: 112 (20 Mar 2023 23:30) (86 - 112)  BP: 113/74 (20 Mar 2023 23:30) (113/74 - 152/82)  BP(mean): --  RR: 18 (20 Mar 2023 23:30) (18 - 18)  SpO2: 97% (20 Mar 2023 23:30) (94% - 97%)    Parameters below as of 20 Mar 2023 23:30  Patient On (Oxygen Delivery Method): room air        Physical Exam  General: NAD, resting comfortably in NAD  Mental Status: Easily arousable when touched, but easily agitated and starts to scream  Skin: Warm, dry, no rashes, lesions, ecchymosis, bruises   Head: NCAT  Neuro: Refused exam  Cardiac: S1/S2. No murmus appreciated  Lungs: CTA b/l. No rales, wheezes, rhonchi appreciated b/l.   Abdomen: Refused exam  Extremities: No edema. Full ROM all 4 extremities.   No hip pain elicited bilaterally; No knee bruising, swelling appreciated  No signs of injury to both ankles, no swelling or bruising  Pt would not bend knees, however, strength intact as pt resisted any range of motion        A&P  HPI:  Pt is a 89yo F w/ PMH of HTN, HLD, Afib on Eliquis, HFpEF on Lasix, recent femur fx s/p repair, dementia and macular degeneration pw weakness and flank pain at home.  Pt seen tonight post fall, as pt was found by RN at the side of the bed on both knees and resting buttocks on her ankles  Pt with known dementia. was uncooperative with provider's exam, however, no obvious signs of bruising or injury of both hips, knees or ankles noted. Pt known to have had  LT Hip ORIF 12/4/22, for repair of Intertrochanteric frx. Pt appeared in NAD due to pain as a consequence of injury, and is resting comfortably, but becomes easily agitated when awakened or attempt to examine her.      PLAN:  >Continue to monitor  >Obtain [facility] Tib/Fib  AND Pelvis Xrays       -To be done Portable due to pt noncooperation       -Protocoled for [facility] Fall      -Will follow up results      -Will Obtain Ortho consult if any signs of bony or soft tissue injuries noted on xrays  >Bed Alarm on at all times  >Fall Risk Protocol  >Will inform physician on call   >Will inform family in AM

## 2023-03-22 LAB
ANION GAP SERPL CALC-SCNC: 11 MMOL/L — SIGNIFICANT CHANGE UP (ref 5–17)
BUN SERPL-MCNC: 14 MG/DL — SIGNIFICANT CHANGE UP (ref 7–23)
CALCIUM SERPL-MCNC: 9.5 MG/DL — SIGNIFICANT CHANGE UP (ref 8.4–10.5)
CHLORIDE SERPL-SCNC: 105 MMOL/L — SIGNIFICANT CHANGE UP (ref 96–108)
CO2 SERPL-SCNC: 28 MMOL/L — SIGNIFICANT CHANGE UP (ref 22–31)
CREAT SERPL-MCNC: 0.79 MG/DL — SIGNIFICANT CHANGE UP (ref 0.5–1.3)
EGFR: 70 ML/MIN/1.73M2 — SIGNIFICANT CHANGE UP
GLUCOSE SERPL-MCNC: 99 MG/DL — SIGNIFICANT CHANGE UP (ref 70–99)
HCT VFR BLD CALC: 38.1 % — SIGNIFICANT CHANGE UP (ref 34.5–45)
HGB BLD-MCNC: 11.7 G/DL — SIGNIFICANT CHANGE UP (ref 11.5–15.5)
MCHC RBC-ENTMCNC: 28.7 PG — SIGNIFICANT CHANGE UP (ref 27–34)
MCHC RBC-ENTMCNC: 30.7 GM/DL — LOW (ref 32–36)
MCV RBC AUTO: 93.4 FL — SIGNIFICANT CHANGE UP (ref 80–100)
NRBC # BLD: 0 /100 WBCS — SIGNIFICANT CHANGE UP (ref 0–0)
PLATELET # BLD AUTO: 346 K/UL — SIGNIFICANT CHANGE UP (ref 150–400)
POTASSIUM SERPL-MCNC: 3.3 MMOL/L — LOW (ref 3.5–5.3)
POTASSIUM SERPL-SCNC: 3.3 MMOL/L — LOW (ref 3.5–5.3)
RBC # BLD: 4.08 M/UL — SIGNIFICANT CHANGE UP (ref 3.8–5.2)
RBC # FLD: 15.1 % — HIGH (ref 10.3–14.5)
SARS-COV-2 RNA SPEC QL NAA+PROBE: SIGNIFICANT CHANGE UP
SODIUM SERPL-SCNC: 144 MMOL/L — SIGNIFICANT CHANGE UP (ref 135–145)
WBC # BLD: 7.28 K/UL — SIGNIFICANT CHANGE UP (ref 3.8–10.5)
WBC # FLD AUTO: 7.28 K/UL — SIGNIFICANT CHANGE UP (ref 3.8–10.5)

## 2023-03-22 RX ORDER — CHLORHEXIDINE GLUCONATE 213 G/1000ML
1 SOLUTION TOPICAL DAILY
Refills: 0 | Status: DISCONTINUED | OUTPATIENT
Start: 2023-03-22 | End: 2023-03-23

## 2023-03-22 RX ORDER — VALPROIC ACID (AS SODIUM SALT) 250 MG/5ML
125 SOLUTION, ORAL ORAL ONCE
Refills: 0 | Status: COMPLETED | OUTPATIENT
Start: 2023-03-22 | End: 2023-03-22

## 2023-03-22 RX ORDER — FUROSEMIDE 40 MG
40 TABLET ORAL ONCE
Refills: 0 | Status: COMPLETED | OUTPATIENT
Start: 2023-03-22 | End: 2023-03-22

## 2023-03-22 RX ADMIN — Medication 40 MILLIGRAM(S): at 14:48

## 2023-03-22 RX ADMIN — Medication 50 MILLIGRAM(S): at 06:29

## 2023-03-22 RX ADMIN — Medication 40 MILLIGRAM(S): at 06:29

## 2023-03-22 RX ADMIN — CHLORHEXIDINE GLUCONATE 1 APPLICATION(S): 213 SOLUTION TOPICAL at 17:33

## 2023-03-22 RX ADMIN — APIXABAN 2.5 MILLIGRAM(S): 2.5 TABLET, FILM COATED ORAL at 06:29

## 2023-03-22 RX ADMIN — ATORVASTATIN CALCIUM 10 MILLIGRAM(S): 80 TABLET, FILM COATED ORAL at 21:40

## 2023-03-22 RX ADMIN — LISINOPRIL 20 MILLIGRAM(S): 2.5 TABLET ORAL at 06:29

## 2023-03-22 RX ADMIN — ERTAPENEM SODIUM 120 MILLIGRAM(S): 1 INJECTION, POWDER, LYOPHILIZED, FOR SOLUTION INTRAMUSCULAR; INTRAVENOUS at 15:22

## 2023-03-22 RX ADMIN — SENNA PLUS 2 TABLET(S): 8.6 TABLET ORAL at 21:41

## 2023-03-22 RX ADMIN — POLYETHYLENE GLYCOL 3350 17 GRAM(S): 17 POWDER, FOR SOLUTION ORAL at 21:41

## 2023-03-22 RX ADMIN — APIXABAN 2.5 MILLIGRAM(S): 2.5 TABLET, FILM COATED ORAL at 18:33

## 2023-03-22 NOTE — DIETITIAN INITIAL EVALUATION ADULT - OTHER CALCULATIONS
Defer fluid needs to team.   Actual .4lb used for calculation of kcal and nutrients (account for increased needs due to impaired skin and low BMI)

## 2023-03-22 NOTE — DIETITIAN INITIAL EVALUATION ADULT - NSFNSGIIOFT_GEN_A_CORE
Ht 66" confirmed with daughter  Daughter states UBW ~110 pounds, deny history of wt loss  IBW: 130 pounds (84% IBW)     Wt history during present admission:   standing 114.4lb (3/22)  bed scale 122.3lb (3/21), 122.7lb (3/20), 129.1lb (3/19) ? accuracy of bed scale     Wt history from [facility]: 89.8lb (12/5/22)    Wt discrepancy might due to s/s of lasix use, difference in scale and decreased po intake.    Ht 66" confirmed with daughter  Daughter states UBW ~110 pounds, deny history of wt loss  IBW: 130 pounds (84% IBW)     Wt history during present admission:   standing 114.4lb (3/22)  bed scale 122.3lb (3/21), 122.7lb (3/20), 129.1lb (3/19) ? accuracy of bed scale     Wt history from [facility]: 89.8lb (12/5/22)    Wt discrepancy might due to s/s of lasix use, difference in scale and decreased PO intake.

## 2023-03-22 NOTE — DIETITIAN INITIAL EVALUATION ADULT - REASON FOR ADMISSION
Tubulointerstitial nephritis    Per chart "Patient is a 89yo F w/ PMH of cognitive impairment HTN, HLD, Afib on Eliquis, HFpEF on Lasix, recent femur fx s/p [id number], dementia and macular degeneration pw weakness and altered mental status, left flank pain  ESBL E.coli UTI/pyelonephritis".  Pt had a fall in hospital 3/21.

## 2023-03-22 NOTE — PROGRESS NOTE ADULT - SUBJECTIVE AND OBJECTIVE BOX
CARDIOLOGY FOLLOW UP - Dr. Escudero  DATE OF SERVICE: 3/22/23    CC  No CV complaints    REVIEW OF SYSTEMS:  CONSTITUTIONAL: No fever, weight loss, or fatigue  RESPIRATORY: No cough, wheezing, chills or hemoptysis; No Shortness of Breath  CARDIOVASCULAR: No chest pain, palpitations, passing out, dizziness, or leg swelling  GASTROINTESTINAL: No abdominal or epigastric pain. No nausea, vomiting, or hematemesis; No diarrhea or constipation. No melena or hematochezia.  VASCULAR: No edema     PHYSICAL EXAM:  T(C): 36.3 (03-22-23 @ 12:08), Max: 36.3 (03-22-23 @ 12:08)  HR: 94 (03-22-23 @ 12:08) (78 - 95)  BP: 122/76 (03-22-23 @ 12:08) (122/76 - 153/90)  RR: 18 (03-22-23 @ 12:08) (18 - 18)  SpO2: 93% (03-22-23 @ 12:08) (93% - 96%)  Wt(kg): --  I&O's Summary    21 Mar 2023 07:01  -  22 Mar 2023 07:00  --------------------------------------------------------  IN: 120 mL / OUT: 0 mL / NET: 120 mL        Appearance: Elderly female	  Cardiovascular: Normal S1 S2,Irregular rhythm, No JVD, No murmurs  Respiratory: Lungs clear to auscultation	  Gastrointestinal:  Soft, Non-tender, + BS	  Extremities: Normal range of motion, No clubbing, cyanosis or edema      Home Medications:  acetaminophen 325 mg oral tablet: 3 tab(s) orally every 8 hours (18 Mar 2023 23:27)  atorvastatin 10 mg oral tablet: 1 tab(s) orally once a day (at bedtime) (18 Mar 2023 23:27)  Eliquis 2.5 mg oral tablet: 1 tab(s) orally every 12 hours (18 Mar 2023 23:27)  furosemide 40 mg oral tablet: 1 tab(s) orally once a day (18 Mar 2023 23:27)  Metoprolol Succinate ER 50 mg oral tablet, extended release: 1 tab(s) orally once a day (18 Mar 2023 23:27)  polyethylene glycol 3350 oral powder for reconstitution: 17 gram(s) orally once a day (18 Mar 2023 23:27)  ramipril 5 mg oral tablet: 1 tab(s) orally 2 times a day (18 Mar 2023 23:27)  senna leaf extract oral tablet: 2 tab(s) orally once a day (at bedtime) (18 Mar 2023 23:27)      MEDICATIONS  (STANDING):  apixaban 2.5 milliGRAM(s) Oral every 12 hours  atorvastatin 10 milliGRAM(s) Oral at bedtime  chlorhexidine 2% Cloths 1 Application(s) Topical daily  ertapenem  IVPB 1000 milliGRAM(s) IV Intermittent every 24 hours  furosemide    Tablet 40 milliGRAM(s) Oral daily  influenza  Vaccine (HIGH DOSE) 0.7 milliLiter(s) IntraMuscular once  lisinopril 20 milliGRAM(s) Oral daily  metoprolol succinate ER 50 milliGRAM(s) Oral daily  polyethylene glycol 3350 17 Gram(s) Oral daily  senna 2 Tablet(s) Oral at bedtime      TELEMETRY: Afib  	    ECG:  	  RADIOLOGY:   DIAGNOSTIC TESTING:  [ ] Echocardiogram:  [ ]  Catheterization:  [ ] Stress Test:    OTHER: 	    LABS:	 	        03-21    144  |  107  |  14  ----------------------------<  91  3.4<L>   |  26  |  0.73    Ca    9.3      21 Mar 2023 06:54

## 2023-03-22 NOTE — DIETITIAN INITIAL EVALUATION ADULT - NSFNSNUTRCHEWSWALLOWFT_GEN_A_CORE
Daughters deny chewing and swallowing difficulty. However, requesting softer/chopped foods to encourage po intake due to dx dementia and older age.

## 2023-03-22 NOTE — PHARMACOTHERAPY INTERVENTION NOTE - COMMENTS
MARGARITA BECERRA, 89y Female with presentation for flank pain, weakness, AMS, concern for pyelonephritis. Patient empirically started on ceftriaxone, then urine culture resulted with ESBL E. coli and changed to ertapenem. The organism showed susceptibility towards bactrim.    Recommendation(s):  1) Discussed with ID, since patient is taking and tolerating PO at this time, suggested to change ertapenem to PO bactrim 1 DS BID instead of waiting until discharge (bactrim has nearly 100% bioavailability as is a preferred option for pyelonephritis when organisms are susceptible to bactrim). ID agreed, to start bactrim PO tomorrow 3/23/2023 to complete a 7 day course on 3/27/2023.    With kind regards,  Ricki Juarez, PharmD, BCIDP  Infectious Diseases Clinical Pharmacist  Available on [employer] Teams  .

## 2023-03-22 NOTE — PROGRESS NOTE ADULT - ASSESSMENT
89yo F w/ PMH of HTN, HLD, Afib on Eliquis, HFpEF on Lasix, recent femur fx s/p repair, dementia and macular degeneration pw weakness and flank pain at home.    # Pyelonephritis/UTI  urine cx esble e coli   c/w ertapenem   PT home care     # dementia / delirium  frequent orientation  fall risk  sp fall overnight, did not comply with XR   cannot give seroquel or IV haldol  give olanzapine 2.5 x 1 prior to XR  if still agitated, can try depakote 125 IV x 1   recently at [facility] after fracture; has walker at home and home care.     # Chronic atrial fibrillation with rapid ventricular response  c/w Eliquis 2.5mg BID  c/w home Toprol 50mg qd  cardio following    # Chronic atrial fibrillation with rapid ventricular response  # Chronic heart failure with preserved ejection fraction (HFpEF)  # Hypertension  # HLD  c/w Eliquis 2.5mg BID  c/w home Toprol 50mg qd  No s/s of acute exacerbation  doppler le ro dvt  c/w home Ramipril equivalent w/ hold parameters  c/w home Atorvastatin 10mg qhs    DVT ppx: On Eliquis    dispo : anticipate home in am 03/23.     Please call [facility] with questions [phone].

## 2023-03-22 NOTE — DIETITIAN INITIAL EVALUATION ADULT - NSFNSGIASSESSMENTFT_GEN_A_CORE
Daughters deny nausea/vomiting/constipation/diarrhea. Unable to recall last BM. On senna and miralax.

## 2023-03-22 NOTE — DIETITIAN INITIAL EVALUATION ADULT - PERSON TAUGHT/METHOD
Continue to promote PO intake at meals/snacks with consumption of oral nutrition supplements between meals. Encouraged PO intake as tolerated with emphasis on protein foods as tolerated. Educated pt on foods rich in protein and encouraged consumption as able. All nutrition-related questions answered. RD remains available./verbal instruction/individual instruction/patient instructed/daughter instructed

## 2023-03-22 NOTE — PROGRESS NOTE ADULT - ASSESSMENT
89yo F w/ PMH of HTN, HLD, Afib on Eliquis, HFpEF on Lasix, recent femur fx s/p repair, dementia and macular degeneration pw weakness and flank pain at home    # atrial fibrillation with rapid ventricular response  -in setting of pyelonephritis  -c/w home Toprol dose  -c/w Eliquis 2.5mg BID  -Rate controlled on tele    # Chronic HFpEF  -Legs appear more edematous today  -Give iv lasix 40mg once  -Will re-eval for iv diuresis daily    # Acute  Pyelonephritis  -cont IV abx per primary team  -followup cultures    # R/O acute cathryn  -CT A/P reveals moderately distended gallbladder with small layering stones and pericholecystic edema   -No Sx planned at this time    # Hypertension  -bp controlled  -cont BB/acei      Please call/text me with questions/concerns between 8am-4pm  [phone]

## 2023-03-22 NOTE — PROGRESS NOTE ADULT - SUBJECTIVE AND OBJECTIVE BOX
Patient is a 89y old  Female who presents with a chief complaint of UTI (22 Mar 2023 09:11)      SUBJECTIVE / OVERNIGHT EVENTS:  Patient seen and examined.   Pleasantly demented.       Vital Signs Last 24 Hrs  T(C): 36.3 (22 Mar 2023 12:08), Max: 36.3 (22 Mar 2023 12:08)  T(F): 97.4 (22 Mar 2023 12:08), Max: 97.4 (22 Mar 2023 12:08)  HR: 94 (22 Mar 2023 12:08) (78 - 95)  BP: 122/76 (22 Mar 2023 12:08) (122/76 - 153/90)  BP(mean): --  RR: 18 (22 Mar 2023 12:08) (18 - 18)  SpO2: 93% (22 Mar 2023 12:08) (93% - 96%)    Parameters below as of 22 Mar 2023 12:08  Patient On (Oxygen Delivery Method): room air      I&O's Summary    21 Mar 2023 07:01  -  22 Mar 2023 07:00  --------------------------------------------------------  IN: 120 mL / OUT: 0 mL / NET: 120 mL          PE:  GENERAL: NAD, AAOx1  CHEST/LUNG: CTABL, No wheeze  HEART: Regular rate and rhythm; + murmur  ABDOMEN: Soft, Nontender, Nondistended; Bowel sounds present  EXTREMITIES:  2+ Peripheral Pulses, +1 le edema, non tender  NEURO: pleasantly confused  SKIN no echymosis      LABS:    03-21    144  |  107  |  14  ----------------------------<  91  3.4<L>   |  26  |  0.73    Ca    9.3      21 Mar 2023 06:54        CAPILLARY BLOOD GLUCOSE                RADIOLOGY & ADDITIONAL TESTS:    Imaging Personally Reviewed:  [x] YES  [ ] NO    Consultant(s) Notes Reviewed:  [x] YES  [ ] NO      MEDICATIONS  (STANDING):  apixaban 2.5 milliGRAM(s) Oral every 12 hours  atorvastatin 10 milliGRAM(s) Oral at bedtime  chlorhexidine 2% Cloths 1 Application(s) Topical daily  ertapenem  IVPB 1000 milliGRAM(s) IV Intermittent every 24 hours  furosemide    Tablet 40 milliGRAM(s) Oral daily  influenza  Vaccine (HIGH DOSE) 0.7 milliLiter(s) IntraMuscular once  lisinopril 20 milliGRAM(s) Oral daily  metoprolol succinate ER 50 milliGRAM(s) Oral daily  polyethylene glycol 3350 17 Gram(s) Oral daily  senna 2 Tablet(s) Oral at bedtime    MEDICATIONS  (PRN):  acetaminophen     Tablet .. 650 milliGRAM(s) Oral every 6 hours PRN Temp greater or equal to 38C (100.4F), Mild Pain (1 - 3)      Care Discussed with Consultants/Other Providers [x] YES  [ ] NO    HEALTH ISSUES - PROBLEM Dx:  Pyelonephritis    Chronic atrial fibrillation with rapid ventricular response    Hypertension    HLD (hyperlipidemia)    Chronic heart failure with preserved ejection fraction (HFpEF)    Prophylactic measure    Imaging abnormality

## 2023-03-22 NOTE — DIETITIAN INITIAL EVALUATION ADULT - ADD RECOMMEND
1. Recommend to change diet to soft/bite size per family request. RD to remains available to adjust diet as needed.     2. Recommend Ensure plus high protein 240ml 2x daily (700kcal, 40g proteins) for providing additional kcal/nutrients due to PO <50%.   3. Recommend Sonny BID to aid wound healing.   4. Recommend multivitamins/minerals and vit C, pending no medical contraindication, due to poor po and to aid wound healing.   5. Malnutrition/BMI <19 alert placed in chart.     6. Assist with meals PRN and encourage PO intake.   7. Monitor PO intake, Labs, weights, BMs, and skin integrity.   8. Encourage use of daily menus.       Honor food and beverage preferences within diet restriction of patient in an effort to maximize level of nutrient intake  1. Recommend to change diet texture to soft/bite size per family request. Continue on diet free of therapeutic restriction. RD to remains available to adjust diet as needed.     2. Recommend Ensure plus high protein 240ml 2x daily (700kcal, 40g proteins) for additional kcal/nutrients due to PO <50%.   3. Recommend Sonny BID to aid wound healing.   4. Recommend multivitamins/minerals and vit C, pending no medical contraindication, due to poor po and to aid wound healing.   5. Malnutrition/BMI <19 alert placed in chart.     6. Assist with meals PRN and encourage PO intake.   7. Monitor PO intake, Labs, weights, BMs, and skin integrity.   8. Encourage use of daily menus.       Honor food and beverage preferences within diet restriction of patient in an effort to maximize level of nutrient intake

## 2023-03-22 NOTE — DIETITIAN INITIAL EVALUATION ADULT - PERTINENT MEDS FT
Patient presents via EMS from the [place] [id number]. Per EMS she was attempting to go from the bed to the wheelchair and fell sustaining a laceration to the right lower leg. Approximately 3 cm in lentgth
MEDICATIONS  (STANDING):  apixaban 2.5 milliGRAM(s) Oral every 12 hours  atorvastatin 10 milliGRAM(s) Oral at bedtime  chlorhexidine 2% Cloths 1 Application(s) Topical daily  ertapenem  IVPB 1000 milliGRAM(s) IV Intermittent every 24 hours  furosemide    Tablet 40 milliGRAM(s) Oral daily  influenza  Vaccine (HIGH DOSE) 0.7 milliLiter(s) IntraMuscular once  lisinopril 20 milliGRAM(s) Oral daily  metoprolol succinate ER 50 milliGRAM(s) Oral daily  polyethylene glycol 3350 17 Gram(s) Oral daily  senna 2 Tablet(s) Oral at bedtime    MEDICATIONS  (PRN):  acetaminophen     Tablet .. 650 milliGRAM(s) Oral every 6 hours PRN Temp greater or equal to 38C (100.4F), Mild Pain (1 - 3)

## 2023-03-22 NOTE — DIETITIAN INITIAL EVALUATION ADULT - NSFNSPHYEXAMSKINFT_GEN_A_CORE
multiple pressure injuries (refer to wound care note 3/20/23)  B/L heel deep tissue injuries present on admission  B/L buttocks/sacral deep tissue injury, present on admission  B/L ischium deep tissue injuries, present on admission

## 2023-03-22 NOTE — PROGRESS NOTE ADULT - ASSESSMENT
Patient is a 89yo F w/ PMH of cognitive impairment HTN, HLD, Afib on Eliquis, HFpEF on Lasix, recent femur fx s/p [id number]/[id number], dementia and macular degeneration pw weakness and altered mental status, left flank pain  ESBL E.coli UTI/pyelonephritis     plan  pt no fever, no leukocytosis -- although in 89 yr old, may not mount  cannot give gu history due to cognitive dysfunction  so treat for cystitis/pyelo given flank pain  no s/s of upper tract infection  us and ct noted with gall bladder sludge  pain resolved, no transaminitis  if any progression of symptoms can check hida    Ucx with ESBL E.coli, sensitivities reviewed  S/p ceftriaxone 3/18-3/21  Continue ertapenem 1g IV Q24h --can plan to complete 7d course on 3/27   - if discharged, can plan to change to bactrim DS 1 tab PO BID      Jay Sharpe M.D.  [facility], Division of Infectious Diseases  [phone]  After 5pm on weekdays and all day on weekends - please call [phone]     Patient is a 89yo F w/ PMH of cognitive impairment HTN, HLD, Afib on Eliquis, HFpEF on Lasix, recent femur fx s/p [id number]/[id number], dementia and macular degeneration pw weakness and altered mental status, left flank pain  ESBL E.coli UTI/pyelonephritis     plan  pt no fever, no leukocytosis -- although in 89 yr old, may not mount  cannot give gu history due to cognitive dysfunction  so treat for cystitis/pyelo given flank pain  no s/s of upper tract infection  us and ct noted with gall bladder sludge  pain resolved, no transaminitis  if any progression of symptoms can check hida    Ucx with ESBL E.coli, sensitivities reviewed  S/p ceftriaxone 3/18-3/21  Continue ertapenem 1g IV Q24h today, then  Start bactrim DS 1 tab PO BID tomorrow to complete 7d course on 3/27      Jay Sharpe M.D.  [facility], Division of Infectious Diseases  [phone]  After 5pm on weekdays and all day on weekends - please call [phone]

## 2023-03-22 NOTE — DIETITIAN INITIAL EVALUATION ADULT - ENERGY INTAKE
Poor (<50%) Pt observed with PO <50% today at lunch. Flowsheet reports 26-50% on 3/21 and 3/20 with assistance with feeding.

## 2023-03-22 NOTE — DIETITIAN INITIAL EVALUATION ADULT - REASON INDICATOR FOR ASSESSMENT
Seen for BMI <19. Unable to get information from pt due to dx dementia. Information obtained from daughters at bedside (Jasmyn Carballo and Jenny Carballo), RN, electronic medical record.

## 2023-03-22 NOTE — DIETITIAN INITIAL EVALUATION ADULT - PERTINENT LABORATORY DATA
03-21    144  |  107  |  14  ----------------------------<  91  3.4<L>   |  26  |  0.73    Ca    9.3      21 Mar 2023 06:54    A1C with Estimated Average Glucose Result: 5.6 % (12-08-22 @ 04:21)

## 2023-03-22 NOTE — DIETITIAN INITIAL EVALUATION ADULT - OTHER INFO
Pt on ertapenem for UTI.   Pt on lasix, might cause wt fluctuation.   3/22/23 K 3.4 slightly depleted, might due to s/s of lasix use.

## 2023-03-22 NOTE — DIETITIAN INITIAL EVALUATION ADULT - ORAL INTAKE PTA/DIET HISTORY
Daughters reports pt always eat small portion of foods PTA. Food preferences updated with daughters, pt does not like fish, enjoy softer foods ex: ice cream, soup, canned fruits. Daughters deny chewing and swallowing disorder however requesting chopped/softer foods to pt due to old age to ease po intake.  Pt not on any therapeutic diet and oral nutrition supplement PTA. On multivitamins/minerals PTA. Daughters report pt is allergic to sesame. Daughters reports pt always eat small portion of foods PTA. Food preferences updated with daughters, pt does not like fish, enjoy softer foods ex: ice cream, soup, canned fruits. Daughters deny chewing and swallowing disorder however requesting chopped/softer foods to pt due to old age to ease PO intake.  Pt not on any therapeutic diet and oral nutrition supplement PTA. On multivitamins/minerals PTA. Daughters report pt is allergic to sesame.

## 2023-03-22 NOTE — PROGRESS NOTE ADULT - SUBJECTIVE AND OBJECTIVE BOX
[facility] DIVISION OF INFECTIOUS DISEASES  RONAL Cevallos Y. Patel, S. Shah, G. Casimir  [phone]  ([phone] - weekdays after 5pm and weekends)    Name: MARGARITA BECERRA  Age/Gender: 89y Female  MRN: [id number]    Interval History:  Patient seen and examined this morning.   Denies pain, says she is waiting for her coffee.   Notes reviewed. Afebrile.   Allergies: No Known Allergies    Objective:  Vitals:   T(F): 97.2 (03-22-23 @ 04:48), Max: 97.2 (03-21-23 @ 11:52)  HR: 78 (03-22-23 @ 04:48) (78 - 95)  BP: 153/90 (03-22-23 @ 04:48) (136/78 - 153/90)  RR: 18 (03-22-23 @ 04:48) (18 - 18)  SpO2: 94% (03-22-23 @ 04:48) (94% - 97%)  Physical Examination:  General: no acute distress  HEENT: NC/AT, EOMI, anicteric, neck supple  Cardio: S1, S2 present, normal rate  Resp: clear to auscultation bilaterally  Abd: soft, NT, ND, + BS  Neuro: awake. alert, answers/follows   Ext: LE edema, no cyanosis   Skin: warm, dry, no visible rash  Psych: appropriate affect and mood for situation  Lines: PIV    Laboratory Studies:  CBC:   WBC Trend:  8.32 03-19-23 @ 06:53  8.81 03-18-23 @ 10:04    CMP: 03-21    144  |  107  |  14  ----------------------------<  91  3.4<L>   |  26  |  0.73    Ca    9.3      21 Mar 2023 06:54    Creatinine, Serum: 0.73 mg/dL (03-21-23 @ 06:54)  Creatinine, Serum: 0.68 mg/dL (03-19-23 @ 06:52)  Creatinine, Serum: 0.73 mg/dL (03-18-23 @ 10:04)    Microbiology: reviewed   Culture - Urine (collected 03-18-23 @ 12:40)  Source: Clean Catch Clean Catch (Midstream)  Final Report (03-21-23 @ 09:47):    >100,000 CFU/ml Escherichia coli ESBL  Organism: Escherichia coli ESBL (03-21-23 @ 09:47)  Organism: Escherichia coli ESBL (03-21-23 @ 09:47)      -  Amikacin: S <=16      -  Amoxicillin/Clavulanic Acid: S <=8/4 Consider reserving for cystitis when ampicillin/sulbactam is resistant      -  Ampicillin: R >16 These ampicillin results predict results for amoxicillin      -  Ampicillin/Sulbactam: R >16/8 Enterobacter, Klebsiella aerogenes, Citrobacter, and Serratia may develop resistance during prolonged therapy (3-4 days)      -  Aztreonam: R >16      -  Cefazolin: R >16 For uncomplicated UTI with K. pneumoniae, E. coli, or P. mirablis: IRVING <=16 is sensitive and IRVING >=32 is resistant. This also predicts results for oral agents cefaclor, cefdinir, cefpodoxime, cefprozil, cefuroxime axetil, cephalexin and locarbef for uncomplicated UTI. Note that some isolates may be susceptible to these agents while testing resistant to cefazolin.      -  Cefepime: R >16      -  Ceftriaxone: R >32 Enterobacter, Klebsiella aerogenes, Citrobacter, and Serratia may develop resistance during prolonged therapy      -  Cefuroxime: R >16      -  Ciprofloxacin: R >2      -  Ertapenem: S <=0.5      -  Gentamicin: S <=2      -  Imipenem: S <=1      -  Levofloxacin: R >4      -  Meropenem: S <=1      -  Nitrofurantoin: S <=32 Should not be used to treat pyelonephritis      -  Piperacillin/Tazobactam: S <=8      -  Tobramycin: S <=2      -  Trimethoprim/Sulfamethoxazole: S <=0.5/9.5      Method Type: IRVING    SARS-CoV-2 Result: Sonal (18 Mar 2023 10:04)    Radiology: reviewed     Medications:  acetaminophen     Tablet .. 650 milliGRAM(s) Oral every 6 hours PRN  apixaban 2.5 milliGRAM(s) Oral every 12 hours  atorvastatin 10 milliGRAM(s) Oral at bedtime  chlorhexidine 2% Cloths 1 Application(s) Topical daily  ertapenem  IVPB 1000 milliGRAM(s) IV Intermittent every 24 hours  furosemide    Tablet 40 milliGRAM(s) Oral daily  influenza  Vaccine (HIGH DOSE) 0.7 milliLiter(s) IntraMuscular once  lisinopril 20 milliGRAM(s) Oral daily  metoprolol succinate ER 50 milliGRAM(s) Oral daily  polyethylene glycol 3350 17 Gram(s) Oral daily  senna 2 Tablet(s) Oral at bedtime    Current Antimicrobials:  ertapenem  IVPB 1000 milliGRAM(s) IV Intermittent every 24 hours    Prior/Completed Antimicrobials:  cefTRIAXone   IVPB  vancomycin  IVPB

## 2023-03-23 ENCOUNTER — TRANSCRIPTION ENCOUNTER (OUTPATIENT)
Age: 88
End: 2023-03-23

## 2023-03-23 VITALS
SYSTOLIC BLOOD PRESSURE: 124 MMHG | HEART RATE: 88 BPM | DIASTOLIC BLOOD PRESSURE: 70 MMHG | OXYGEN SATURATION: 96 % | RESPIRATION RATE: 18 BRPM | TEMPERATURE: 98 F

## 2023-03-23 LAB
ANION GAP SERPL CALC-SCNC: 13 MMOL/L — SIGNIFICANT CHANGE UP (ref 5–17)
BUN SERPL-MCNC: 14 MG/DL — SIGNIFICANT CHANGE UP (ref 7–23)
CALCIUM SERPL-MCNC: 9.7 MG/DL — SIGNIFICANT CHANGE UP (ref 8.4–10.5)
CHLORIDE SERPL-SCNC: 104 MMOL/L — SIGNIFICANT CHANGE UP (ref 96–108)
CO2 SERPL-SCNC: 26 MMOL/L — SIGNIFICANT CHANGE UP (ref 22–31)
CREAT SERPL-MCNC: 0.74 MG/DL — SIGNIFICANT CHANGE UP (ref 0.5–1.3)
EGFR: 75 ML/MIN/1.73M2 — SIGNIFICANT CHANGE UP
GLUCOSE SERPL-MCNC: 96 MG/DL — SIGNIFICANT CHANGE UP (ref 70–99)
HCT VFR BLD CALC: 37.9 % — SIGNIFICANT CHANGE UP (ref 34.5–45)
HGB BLD-MCNC: 11.7 G/DL — SIGNIFICANT CHANGE UP (ref 11.5–15.5)
MAGNESIUM SERPL-MCNC: 2.2 MG/DL — SIGNIFICANT CHANGE UP (ref 1.6–2.6)
MCHC RBC-ENTMCNC: 29.1 PG — SIGNIFICANT CHANGE UP (ref 27–34)
MCHC RBC-ENTMCNC: 30.9 GM/DL — LOW (ref 32–36)
MCV RBC AUTO: 94.3 FL — SIGNIFICANT CHANGE UP (ref 80–100)
MRSA PCR RESULT.: SIGNIFICANT CHANGE UP
NRBC # BLD: 0 /100 WBCS — SIGNIFICANT CHANGE UP (ref 0–0)
PHOSPHATE SERPL-MCNC: 2.7 MG/DL — SIGNIFICANT CHANGE UP (ref 2.5–4.5)
PLATELET # BLD AUTO: 314 K/UL — SIGNIFICANT CHANGE UP (ref 150–400)
POTASSIUM SERPL-MCNC: 3.4 MMOL/L — LOW (ref 3.5–5.3)
POTASSIUM SERPL-SCNC: 3.4 MMOL/L — LOW (ref 3.5–5.3)
RBC # BLD: 4.02 M/UL — SIGNIFICANT CHANGE UP (ref 3.8–5.2)
RBC # FLD: 14.8 % — HIGH (ref 10.3–14.5)
S AUREUS DNA NOSE QL NAA+PROBE: SIGNIFICANT CHANGE UP
SODIUM SERPL-SCNC: 143 MMOL/L — SIGNIFICANT CHANGE UP (ref 135–145)
WBC # BLD: 7.1 K/UL — SIGNIFICANT CHANGE UP (ref 3.8–10.5)
WBC # FLD AUTO: 7.1 K/UL — SIGNIFICANT CHANGE UP (ref 3.8–10.5)

## 2023-03-23 PROCEDURE — 74177 CT ABD & PELVIS W/CONTRAST: CPT | Mod: MA

## 2023-03-23 PROCEDURE — 97530 THERAPEUTIC ACTIVITIES: CPT

## 2023-03-23 PROCEDURE — 76705 ECHO EXAM OF ABDOMEN: CPT

## 2023-03-23 PROCEDURE — 93970 EXTREMITY STUDY: CPT

## 2023-03-23 PROCEDURE — 97161 PT EVAL LOW COMPLEX 20 MIN: CPT

## 2023-03-23 PROCEDURE — 81001 URINALYSIS AUTO W/SCOPE: CPT

## 2023-03-23 PROCEDURE — 80053 COMPREHEN METABOLIC PANEL: CPT

## 2023-03-23 PROCEDURE — 87637 SARSCOV2&INF A&B&RSV AMP PRB: CPT

## 2023-03-23 PROCEDURE — 96374 THER/PROPH/DIAG INJ IV PUSH: CPT

## 2023-03-23 PROCEDURE — 83880 ASSAY OF NATRIURETIC PEPTIDE: CPT

## 2023-03-23 PROCEDURE — 85025 COMPLETE CBC W/AUTO DIFF WBC: CPT

## 2023-03-23 PROCEDURE — 73590 X-RAY EXAM OF LOWER LEG: CPT

## 2023-03-23 PROCEDURE — 96376 TX/PRO/DX INJ SAME DRUG ADON: CPT

## 2023-03-23 PROCEDURE — 73552 X-RAY EXAM OF FEMUR 2/>: CPT

## 2023-03-23 PROCEDURE — 80048 BASIC METABOLIC PNL TOTAL CA: CPT

## 2023-03-23 PROCEDURE — 83735 ASSAY OF MAGNESIUM: CPT

## 2023-03-23 PROCEDURE — 83690 ASSAY OF LIPASE: CPT

## 2023-03-23 PROCEDURE — 87186 SC STD MICRODIL/AGAR DIL: CPT

## 2023-03-23 PROCEDURE — 87086 URINE CULTURE/COLONY COUNT: CPT

## 2023-03-23 PROCEDURE — U0003: CPT

## 2023-03-23 PROCEDURE — 87641 MR-STAPH DNA AMP PROBE: CPT

## 2023-03-23 PROCEDURE — 97116 GAIT TRAINING THERAPY: CPT

## 2023-03-23 PROCEDURE — 72170 X-RAY EXAM OF PELVIS: CPT

## 2023-03-23 PROCEDURE — 84100 ASSAY OF PHOSPHORUS: CPT

## 2023-03-23 PROCEDURE — 87640 STAPH A DNA AMP PROBE: CPT

## 2023-03-23 PROCEDURE — 96375 TX/PRO/DX INJ NEW DRUG ADDON: CPT

## 2023-03-23 PROCEDURE — 36415 COLL VENOUS BLD VENIPUNCTURE: CPT

## 2023-03-23 PROCEDURE — 99285 EMERGENCY DEPT VISIT HI MDM: CPT | Mod: 25

## 2023-03-23 PROCEDURE — U0005: CPT

## 2023-03-23 PROCEDURE — 85027 COMPLETE CBC AUTOMATED: CPT

## 2023-03-23 RX ORDER — POLYETHYLENE GLYCOL 3350 17 G/17G
17 POWDER, FOR SOLUTION ORAL
Qty: 255 | Refills: 0
Start: 2023-03-23 | End: 2023-04-06

## 2023-03-23 RX ORDER — POTASSIUM CHLORIDE 20 MEQ
40 PACKET (EA) ORAL ONCE
Refills: 0 | Status: COMPLETED | OUTPATIENT
Start: 2023-03-23 | End: 2023-03-23

## 2023-03-23 RX ADMIN — CHLORHEXIDINE GLUCONATE 1 APPLICATION(S): 213 SOLUTION TOPICAL at 10:24

## 2023-03-23 RX ADMIN — LISINOPRIL 20 MILLIGRAM(S): 2.5 TABLET ORAL at 09:36

## 2023-03-23 RX ADMIN — APIXABAN 2.5 MILLIGRAM(S): 2.5 TABLET, FILM COATED ORAL at 09:37

## 2023-03-23 RX ADMIN — Medication 40 MILLIEQUIVALENT(S): at 09:50

## 2023-03-23 RX ADMIN — Medication 50 MILLIGRAM(S): at 09:37

## 2023-03-23 RX ADMIN — Medication 1 TABLET(S): at 09:49

## 2023-03-23 RX ADMIN — Medication 40 MILLIGRAM(S): at 09:49

## 2023-03-23 NOTE — PROGRESS NOTE ADULT - PROVIDER SPECIALTY LIST ADULT
Infectious Disease
Internal Medicine
Cardiology
Cardiology
Infectious Disease
Infectious Disease
Internal Medicine
Internal Medicine
Podiatry
Cardiology
Cardiology
Infectious Disease
Internal Medicine
Internal Medicine
Surgery

## 2023-03-23 NOTE — PROGRESS NOTE ADULT - ASSESSMENT
89yo F w/ PMH of HTN, HLD, Afib on Eliquis, HFpEF on Lasix, recent femur fx s/p repair, dementia and macular degeneration pw weakness and flank pain at home.    # Pyelonephritis/UTI  urine cx esble e coli   c/w ertapenem ; switch to bactric on dc today per ID  PT home care     # dementia / delirium  frequent orientation  fall risk  sp fall overnight, did not comply with XR   cannot give seroquel or IV haldol  give olanzapine 2.5 x 1 prior to XR  if still agitated, can try depakote 125 IV x 1   recently at [facility] after fracture; has walker at home and home care.     # Chronic atrial fibrillation with rapid ventricular response  c/w Eliquis 2.5mg BID  c/w home Toprol 50mg qd  cardio following    # Chronic atrial fibrillation with rapid ventricular response  # Chronic heart failure with preserved ejection fraction (HFpEF)  # Hypertension  # HLD  c/w Eliquis 2.5mg BID  c/w home Toprol 50mg qd  No s/s of acute exacerbation  doppler le ro dvt  c/w home Ramipril equivalent w/ hold parameters  c/w home Atorvastatin 10mg qhs    DVT ppx: On Eliquis    dispo : anticipate home today 03/23.     Please call [facility] with questions [phone].

## 2023-03-23 NOTE — PROGRESS NOTE ADULT - ASSESSMENT
Patient is a 89yo F w/ PMH of cognitive impairment HTN, HLD, Afib on Eliquis, HFpEF on Lasix, recent femur fx s/p [id number]/2022, dementia and macular degeneration pw weakness and altered mental status, left flank pain  ESBL E.coli UTI/pyelonephritis   -Ucx with ESBL E.coli, sensitivities reviewed  -s/p ceftriaxone 3/18-3/21  - s/p ertapenem 3/21-3/22    Recommendations:  Start bactrim DS 1 tab PO BID from today to complete 7d course on 3/27  Stable from ID standpoint at this time       Jay Sharpe M.D.  [facility], Division of Infectious Diseases  [phone]  After 5pm on weekdays and all day on weekends - please call [phone]

## 2023-03-23 NOTE — PROGRESS NOTE ADULT - SUBJECTIVE AND OBJECTIVE BOX
[facility] DIVISION OF INFECTIOUS DISEASES  RONAL Cevallos Y. Patel, S. Shah, G. Migue  [phone]  ([phone] - weekdays after 5pm and weekends)    Name: MARGARITA BECERRA  Age/Gender: 89y Female  MRN: [id number]    Interval History:  Patient seen and examined this morning.   Resting comfortably, denies pain.   Notes reviewed  No concerning overnight events  Afebrile   Allergies: No Known Drug Allergies  [place] (Anaphylaxis)      Objective:  Vitals:   T(F): 97.5 (03-23-23 @ 04:36), Max: 97.6 (03-22-23 @ 20:31)  HR: 95 (03-23-23 @ 04:36) (94 - 133)  BP: 140/94 (03-23-23 @ 04:36) (106/56 - 140/94)  RR: 18 (03-23-23 @ 04:36) (18 - 18)  SpO2: 95% (03-23-23 @ 04:36) (93% - 97%)  Physical Examination:  General: no acute distress, nontoxic appearing  HEENT: NC/AT, anicteric, neck supple  Respiratory: no acc muscle use, breathing comfortably  Cardiovascular: S1 and S2 present  Gastrointestinal: normal appearing, nondistended  Extremities: LE edema, no cyanosis  Skin: no visible rash    Laboratory Studies:  CBC:                       11.7   7.28  )-----------( 346      ( 22 Mar 2023 16:29 )             38.1     WBC Trend:  7.28 03-22-23 @ 16:29  8.32 03-19-23 @ 06:53  8.81 03-18-23 @ 10:04    CMP: 03-22    144  |  105  |  14  ----------------------------<  99  3.3<L>   |  28  |  0.79    Ca    9.5      22 Mar 2023 16:29      Creatinine, Serum: 0.79 mg/dL (03-22-23 @ 16:29)  Creatinine, Serum: 0.73 mg/dL (03-21-23 @ 06:54)  Creatinine, Serum: 0.68 mg/dL (03-19-23 @ 06:52)  Creatinine, Serum: 0.73 mg/dL (03-18-23 @ 10:04)    Microbiology: reviewed     Culture - Urine (collected 03-18-23 @ 12:40)  Source: Clean Catch Clean Catch (Midstream)  Final Report (03-21-23 @ 09:47):    >100,000 CFU/ml Escherichia coli ESBL  Organism: Escherichia coli ESBL (03-21-23 @ 09:47)  Organism: Escherichia coli ESBL (03-21-23 @ 09:47)      -  Amikacin: S <=16      -  Amoxicillin/Clavulanic Acid: S <=8/4 Consider reserving for cystitis when ampicillin/sulbactam is resistant      -  Ampicillin: R >16 These ampicillin results predict results for amoxicillin      -  Ampicillin/Sulbactam: R >16/8 Enterobacter, Klebsiella aerogenes, Citrobacter, and Serratia may develop resistance during prolonged therapy (3-4 days)      -  Aztreonam: R >16      -  Cefazolin: R >16 For uncomplicated UTI with K. pneumoniae, E. coli, or P. mirablis: IRVING <=16 is sensitive and IRVING >=32 is resistant. This also predicts results for oral agents cefaclor, cefdinir, cefpodoxime, cefprozil, cefuroxime axetil, cephalexin and locarbef for uncomplicated UTI. Note that some isolates may be susceptible to these agents while testing resistant to cefazolin.      -  Cefepime: R >16      -  Ceftriaxone: R >32 Enterobacter, Klebsiella aerogenes, Citrobacter, and Serratia may develop resistance during prolonged therapy      -  Cefuroxime: R >16      -  Ciprofloxacin: R >2      -  Ertapenem: S <=0.5      -  Gentamicin: S <=2      -  Imipenem: S <=1      -  Levofloxacin: R >4      -  Meropenem: S <=1      -  Nitrofurantoin: S <=32 Should not be used to treat pyelonephritis      -  Piperacillin/Tazobactam: S <=8      -  Tobramycin: S <=2      -  Trimethoprim/Sulfamethoxazole: S <=0.5/9.5      Method Type: IRVING    COVID-19 PCR: Scott (22 Mar 2023 07:51)    Radiology: reviewed     Medications:  acetaminophen     Tablet .. 650 milliGRAM(s) Oral every 6 hours PRN  apixaban 2.5 milliGRAM(s) Oral every 12 hours  atorvastatin 10 milliGRAM(s) Oral at bedtime  chlorhexidine 2% Cloths 1 Application(s) Topical daily  furosemide    Tablet 40 milliGRAM(s) Oral daily  influenza  Vaccine (HIGH DOSE) 0.7 milliLiter(s) IntraMuscular once  lisinopril 20 milliGRAM(s) Oral daily  metoprolol succinate ER 50 milliGRAM(s) Oral daily  polyethylene glycol 3350 17 Gram(s) Oral daily  senna 2 Tablet(s) Oral at bedtime  trimethoprim  160 mG/sulfamethoxazole 800 mG 1 Tablet(s) Oral two times a day    Current Antimicrobials:  trimethoprim  160 mG/sulfamethoxazole 800 mG 1 Tablet(s) Oral two times a day    Prior/Completed Antimicrobials:  cefTRIAXone   IVPB  vancomycin  IVPB

## 2023-03-23 NOTE — DISCHARGE NOTE PROVIDER - HOSPITAL COURSE
89yo F w/ PMH of HTN, HLD, Afib on Eliquis, HFpEF on Lasix, recent femur fx s/p repair, dementia and macular degeneration pw weakness and flank pain at home.  Admitted with UTI/Pyelo, started on CTX, changed to ertapenem. Pt with h/o chronic AFib but developed Afib w/RVR - s/p Lop 5 IV in ED; cont. home Toprol XL 50, improved. Pt found to have distended GB on CT & pericholecystic edema - surgery consulted, unlikely cholecystitis.  No Sx intervention indicated.  Imaging findings more consistent with overall volume overload/redistribution from CHF (perihepatic ascites, gallbladder distension, pleural effusions).  Pt had an in hospital fall on 3/21,  found her knees. Pelvis & B/L Fib/Tib Xrays, no acute fractures.  Seen by PT, recommended home PT  Abx  changed to Bactrim DS for discharge.  Acute issues resolved.  D/C to home with PT.

## 2023-03-23 NOTE — DISCHARGE NOTE PROVIDER - PROVIDER TOKENS
PROVIDER:[TOKEN:[[id number]],FOLLOWUP:[2 weeks]] PROVIDER:[TOKEN:[[id number]],FOLLOWUP:[2 weeks],ESTABLISHEDPATIENT:[T]]

## 2023-03-23 NOTE — PROGRESS NOTE ADULT - SUBJECTIVE AND OBJECTIVE BOX
Patient is a 89y old  Female who presents with a chief complaint of UTI (23 Mar 2023 12:35)      SUBJECTIVE / OVERNIGHT EVENTS:  Patient seen and examined.   Doing well, sitting up in a chair, comfortable.       Vital Signs Last 24 Hrs  T(C): 36.7 (23 Mar 2023 11:29), Max: 36.7 (23 Mar 2023 11:29)  T(F): 98 (23 Mar 2023 11:29), Max: 98 (23 Mar 2023 11:29)  HR: 91 (23 Mar 2023 11:29) (91 - 133)  BP: 121/74 (23 Mar 2023 11:29) (106/56 - 140/94)  BP(mean): --  RR: 18 (23 Mar 2023 11:29) (18 - 18)  SpO2: 97% (23 Mar 2023 11:29) (93% - 97%)    Parameters below as of 23 Mar 2023 11:29  Patient On (Oxygen Delivery Method): room air      I&O's Summary        PE:  GENERAL: NAD, AAOx1  CHEST/LUNG: CTABL, No wheeze  HEART: Regular rate and rhythm; + murmur  ABDOMEN: Soft, Nontender, Nondistended; Bowel sounds present  EXTREMITIES:  2+ Peripheral Pulses, +1 le edema, non tender  NEURO: pleasantly confused  SKIN no echymosis    LABS:                        11.7   7.10  )-----------( 314      ( 23 Mar 2023 11:29 )             37.9     03-23    143  |  104  |  14  ----------------------------<  96  3.4<L>   |  26  |  0.74    Ca    9.7      23 Mar 2023 11:32  Phos  2.7     03-23  Mg     2.2     03-23        CAPILLARY BLOOD GLUCOSE                RADIOLOGY & ADDITIONAL TESTS:    Imaging Personally Reviewed:  [x] YES  [ ] NO    Consultant(s) Notes Reviewed:  [x] YES  [ ] NO      MEDICATIONS  (STANDING):  apixaban 2.5 milliGRAM(s) Oral every 12 hours  atorvastatin 10 milliGRAM(s) Oral at bedtime  chlorhexidine 2% Cloths 1 Application(s) Topical daily  furosemide    Tablet 40 milliGRAM(s) Oral daily  influenza  Vaccine (HIGH DOSE) 0.7 milliLiter(s) IntraMuscular once  lisinopril 20 milliGRAM(s) Oral daily  metoprolol succinate ER 50 milliGRAM(s) Oral daily  polyethylene glycol 3350 17 Gram(s) Oral daily  senna 2 Tablet(s) Oral at bedtime  trimethoprim  160 mG/sulfamethoxazole 800 mG 1 Tablet(s) Oral two times a day    MEDICATIONS  (PRN):  acetaminophen     Tablet .. 650 milliGRAM(s) Oral every 6 hours PRN Temp greater or equal to 38C (100.4F), Mild Pain (1 - 3)      Care Discussed with Consultants/Other Providers [x] YES  [ ] NO    HEALTH ISSUES - PROBLEM Dx:  Pyelonephritis    Chronic atrial fibrillation with rapid ventricular response    Hypertension    HLD (hyperlipidemia)    Chronic heart failure with preserved ejection fraction (HFpEF)    Prophylactic measure    Imaging abnormality

## 2023-03-23 NOTE — DISCHARGE NOTE PROVIDER - NSDCMRMEDTOKEN_GEN_ALL_CORE_FT
acetaminophen 325 mg oral tablet: 3 tab(s) orally every 8 hours  atorvastatin 10 mg oral tablet: 1 tab(s) orally once a day (at bedtime)  Eliquis 2.5 mg oral tablet: 1 tab(s) orally every 12 hours  furosemide 40 mg oral tablet: 1 tab(s) orally once a day  Metoprolol Succinate ER 50 mg oral tablet, extended release: 1 tab(s) orally once a day  polyethylene glycol 3350 oral powder for reconstitution: 17 gram(s) orally once a day  ramipril 5 mg oral tablet: 1 tab(s) orally 2 times a day  senna leaf extract oral tablet: 2 tab(s) orally once a day (at bedtime)   acetaminophen 325 mg oral tablet: 3 tab(s) orally every 8 hours  atorvastatin 10 mg oral tablet: 1 tab(s) orally once a day (at bedtime)  Eliquis 2.5 mg oral tablet: 1 tab(s) orally every 12 hours  furosemide 40 mg oral tablet: 1 tab(s) orally once a day  Metoprolol Succinate ER 50 mg oral tablet, extended release: 1 tab(s) orally once a day  polyethylene glycol 3350 oral powder for reconstitution: 17 gram(s) orally once a day, As Needed   ramipril 5 mg oral tablet: 1 tab(s) orally 2 times a day  senna leaf extract oral tablet: 2 tab(s) orally once a day (at bedtime)  sulfamethoxazole-trimethoprim 800 mg-160 mg oral tablet: 1 tab(s) orally 2 times a day.  FIRST DOSE AT 10 PM TONIGHT.

## 2023-03-23 NOTE — DISCHARGE NOTE PROVIDER - NSDCFUADDAPPT_GEN_ALL_CORE_FT
APPTS ARE READY TO BE MADE: [x ] YES    Best Family or Patient Contact (if needed): Patient's daughter.     APPTS ARE READY TO BE MADE: [x ] YES    Best Family or Patient Contact (if needed): Patient's daughter.    Patient was previously scheduled with Diane Stanley on 04/07 10:00a at [address] [place] [id number].

## 2023-03-23 NOTE — PROGRESS NOTE ADULT - TIME BILLING
agree with above  cv stable  af rate controlled  cont IV abx
Agree with above PA note.  cv stable  cont current tx  cont lasix as ordered

## 2023-03-23 NOTE — DISCHARGE NOTE PROVIDER - CARE PROVIDER_API CALL
Bi Maguire)  Family Medicine  [address] [place] [address]  Phone: [phone]  Fax: [phone]  Follow Up Time: 2 weeks   Diane Pennington)  Gastroenterology; Internal Medicine  [address] [place] [address], [place] [address]  Phone: [phone]  Fax: [phone]  Established Patient  Follow Up Time: 2 weeks

## 2023-03-23 NOTE — DISCHARGE NOTE PROVIDER - NSDCCPCAREPLAN_GEN_ALL_CORE_FT
PRINCIPAL DISCHARGE DIAGNOSIS  Diagnosis: Pyelonephritis  Assessment and Plan of Treatment: HOME CARE INSTRUCTIONS  If you were prescribed antibiotics, take them exactly as your caregiver instructs you. Finish the medication even if you feel better after you have only taken some of the medication.  Drink enough water and fluids to keep your urine clear or pale yellow.  Avoid caffeine, tea, and carbonated beverages. They tend to irritate your bladder.  Empty your bladder often. Avoid holding urine for long periods of time.  Empty your bladder before and after sexual intercourse.  After a bowel movement, women should cleanse from front to back. Use each tissue only once.  SEEK MEDICAL CARE IF:  You have back pain.  You develop a fever.  Your symptoms do not begin to resolve within 3 days.  SEEK IMMEDIATE MEDICAL CARE IF:  You have severe back pain or lower abdominal pain.  You develop chills.  You have nausea or vomiting.  You have continued burning or discomfort with urination.      SECONDARY DISCHARGE DIAGNOSES  Diagnosis: Rapid atrial fibrillation  Assessment and Plan of Treatment: Atrial fibrillation is the most common heart rhythm problem & has the risk of stroke & heart attack  It helps if you control your blood pressure, not drink more than 1-2 alcohol drinks per day, cut down on caffeine, getting treatment for over active thyroid gland, & getting exercise  Call your doctor if you feel your heart racing or beating unusually, chest tightness or pain, lightheaded, faint, shortness of breath especially with exercise  It is important to take your heart medication as prescribed  You may be on anticoagulation which is very important to take as directed. Continue Eliquis    Diagnosis: Fall  Assessment and Plan of Treatment: Rehab has been recommenede for you for conditioning and strengthening     PRINCIPAL DISCHARGE DIAGNOSIS  Diagnosis: Pyelonephritis  Assessment and Plan of Treatment: HOME CARE INSTRUCTIONS  If you were prescribed antibiotics, take them exactly as your caregiver instructs you. Finish the medication even if you feel better after you have only taken some of the medication.  Drink enough water and fluids to keep your urine clear or pale yellow.  Avoid caffeine, tea, and carbonated beverages. They tend to irritate your bladder.  Empty your bladder often. Avoid holding urine for long periods of time.  Empty your bladder before and after sexual intercourse.  After a bowel movement, women should cleanse from front to back. Use each tissue only once.  SEEK MEDICAL CARE IF:  You have back pain.  You develop a fever.  Your symptoms do not begin to resolve within 3 days.  SEEK IMMEDIATE MEDICAL CARE IF:  You have severe back pain or lower abdominal pain.  You develop chills.  You have nausea or vomiting.  You have continued burning or discomfort with urination.      SECONDARY DISCHARGE DIAGNOSES  Diagnosis: Hypertension  Assessment and Plan of Treatment: Take your medication as prescribed.  Follow up with your medical doctor for routine blood pressure monitoring, and to establish long term blood pressure treatment goals.  Low salt diet  Activity as tolerated.  Notify your doctor if you have any of the following symptoms:   Dizziness, Lightheadedness, Blurry vision, Headache, Chest pain, Shortness of breath      Diagnosis: Rapid atrial fibrillation  Assessment and Plan of Treatment: Atrial fibrillation is the most common heart rhythm problem & has the risk of stroke & heart attack  It helps if you control your blood pressure, not drink more than 1-2 alcohol drinks per day, cut down on caffeine, getting treatment for over active thyroid gland, & getting exercise  Call your doctor if you feel your heart racing or beating unusually, chest tightness or pain, lightheaded, faint, shortness of breath especially with exercise  It is important to take your heart medication as prescribed  You may be on anticoagulation which is very important to take as directed. Continue Eliquis    Diagnosis: HLD (hyperlipidemia)  Assessment and Plan of Treatment: Take your medication as prescribed.   Follow up with your medical doctor for routine blood  work monitoring, and to establish long term treatment goals.      Diagnosis: Fall  Assessment and Plan of Treatment: Rehab has been recommenede for you for conditioning and strengthening

## 2023-03-23 NOTE — PROGRESS NOTE ADULT - ASSESSMENT
89yo F w/ PMH of HTN, HLD, Afib on Eliquis, HFpEF on Lasix, recent femur fx s/p repair, dementia and macular degeneration pw weakness and flank pain at home    # atrial fibrillation with rapid ventricular response  -in setting of pyelonephritis  -c/w home Toprol dose  -c/w Eliquis 2.5mg BID  -Rate controlled on tele    # Chronic HFpEF  -Improvement in le edema  -S/p IV lasix yesterday  -Can resume po lasix 40mg    # Acute  Pyelonephritis  -cont IV abx per primary team  -followup cultures    # R/O acute cathryn  -CT A/P reveals moderately distended gallbladder with small layering stones and pericholecystic edema   -No Sx planned at this time    # Hypertension  -bp controlled  -cont BB/acei      No CV CI to d/c  Please call/text me with questions/concerns between 8am-4pm  [phone]   89yo F w/ PMH of HTN, HLD, Afib on Eliquis, HFpEF on Lasix, recent femur fx s/p repair, dementia and macular degeneration pw weakness and flank pain at home    # atrial fibrillation with rapid ventricular response  -in setting of pyelonephritis  -c/w home Toprol dose  -c/w Eliquis 2.5mg BID  -Rate controlled on tele    # Chronic HFpEF  -Improvement in le edema  -S/p IV lasix yesterday  -Can resume po lasix 40mg  -Replete K+    # Acute  Pyelonephritis  -cont IV abx per primary team  -followup cultures    # R/O acute cathryn  -CT A/P reveals moderately distended gallbladder with small layering stones and pericholecystic edema   -No Sx planned at this time    # Hypertension  -bp controlled  -cont BB/acei      No CV CI to d/c  Please call/text me with questions/concerns between 8am-4pm  [phone]   89yo F w/ PMH of HTN, HLD, Afib on Eliquis, HFpEF on Lasix, recent femur fx s/p repair, dementia and macular degeneration pw weakness and flank pain at home    #atrial fibrillation with rapid ventricular response  -in setting of pyelonephritis  -c/w home Toprol dose  -c/w Eliquis 2.5mg BID  -Rate controlled on tele    # Chronic HFpEF  -Improvement in le edema  -S/p IV lasix yesterday  -Can resume po lasix 40mg  -Replete K+    # Acute  Pyelonephritis  -cont IV abx per primary team  -followup cultures    # R/O acute cathryn  -CT A/P reveals moderately distended gallbladder with small layering stones and pericholecystic edema   -No Sx planned at this time    # Hypertension  -bp controlled  -cont BB/acei      No CV CI to d/c  Please call/text me with questions/concerns between 8am-4pm  [phone]

## 2023-03-23 NOTE — PROGRESS NOTE ADULT - SUBJECTIVE AND OBJECTIVE BOX
CARDIOLOGY FOLLOW UP - Dr. Escudero  DATE OF SERVICE: 3/23/23    CC  No CV complaints    REVIEW OF SYSTEMS:  CONSTITUTIONAL: No fever, weight loss, or fatigue  RESPIRATORY: No cough, wheezing, chills or hemoptysis; No Shortness of Breath  CARDIOVASCULAR: No chest pain, palpitations, passing out, dizziness, or leg swelling  GASTROINTESTINAL: No abdominal or epigastric pain. No nausea, vomiting, or hematemesis; No diarrhea or constipation. No melena or hematochezia.  VASCULAR: No edema     PHYSICAL EXAM:  T(C): 36.4 (03-23-23 @ 04:36), Max: 36.4 (03-22-23 @ 20:31)  HR: 95 (03-23-23 @ 04:36) (94 - 133)  BP: 140/94 (03-23-23 @ 04:36) (106/56 - 140/94)  RR: 18 (03-23-23 @ 04:36) (18 - 18)  SpO2: 95% (03-23-23 @ 04:36) (93% - 97%)  Wt(kg): --  I&O's Summary      Appearance: Elderly female, confused	  Cardiovascular: Normal S1 S2,RRR, No JVD, No murmurs  Respiratory: Lungs clear to auscultation b/l  Gastrointestinal:  Soft, Non-tender, + BS	  Extremities: Normal range of motion, No clubbing, cyanosis. +B/l Le edema      Home Medications:  acetaminophen 325 mg oral tablet: 3 tab(s) orally every 8 hours (18 Mar 2023 23:27)  atorvastatin 10 mg oral tablet: 1 tab(s) orally once a day (at bedtime) (18 Mar 2023 23:27)  Eliquis 2.5 mg oral tablet: 1 tab(s) orally every 12 hours (18 Mar 2023 23:27)  furosemide 40 mg oral tablet: 1 tab(s) orally once a day (18 Mar 2023 23:27)  Metoprolol Succinate ER 50 mg oral tablet, extended release: 1 tab(s) orally once a day (18 Mar 2023 23:27)  polyethylene glycol 3350 oral powder for reconstitution: 17 gram(s) orally once a day (18 Mar 2023 23:27)  ramipril 5 mg oral tablet: 1 tab(s) orally 2 times a day (18 Mar 2023 23:27)  senna leaf extract oral tablet: 2 tab(s) orally once a day (at bedtime) (18 Mar 2023 23:27)      MEDICATIONS  (STANDING):  apixaban 2.5 milliGRAM(s) Oral every 12 hours  atorvastatin 10 milliGRAM(s) Oral at bedtime  chlorhexidine 2% Cloths 1 Application(s) Topical daily  furosemide    Tablet 40 milliGRAM(s) Oral daily  influenza  Vaccine (HIGH DOSE) 0.7 milliLiter(s) IntraMuscular once  lisinopril 20 milliGRAM(s) Oral daily  metoprolol succinate ER 50 milliGRAM(s) Oral daily  polyethylene glycol 3350 17 Gram(s) Oral daily  senna 2 Tablet(s) Oral at bedtime  trimethoprim  160 mG/sulfamethoxazole 800 mG 1 Tablet(s) Oral two times a day      TELEMETRY: 	    ECG:  	  RADIOLOGY:   DIAGNOSTIC TESTING:  [ ] Echocardiogram:  [ ]  Catheterization:  [ ] Stress Test:    OTHER: 	    LABS:	 	                            11.7   7.28  )-----------( 346      ( 22 Mar 2023 16:29 )             38.1     03-22    144  |  105  |  14  ----------------------------<  99  3.3<L>   |  28  |  0.79    Ca    9.5      22 Mar 2023 16:29

## 2023-04-08 ENCOUNTER — APPOINTMENT (OUTPATIENT)
Dept: RADIOLOGY | Facility: IMAGING CENTER | Age: 88
End: 2023-04-08
Payer: MEDICARE

## 2023-04-08 ENCOUNTER — OUTPATIENT (OUTPATIENT)
Dept: OUTPATIENT SERVICES | Facility: HOSPITAL | Age: 88
LOS: 1 days | End: 2023-04-08
Payer: MEDICARE

## 2023-04-08 DIAGNOSIS — J18.9 PNEUMONIA, UNSPECIFIED ORGANISM: ICD-10-CM

## 2023-04-08 DIAGNOSIS — S72.143A DISPLACED INTERTROCHANTERIC FRACTURE OF UNSPECIFIED FEMUR, INITIAL ENCOUNTER FOR CLOSED FRACTURE: Chronic | ICD-10-CM

## 2023-04-08 PROCEDURE — 71046 X-RAY EXAM CHEST 2 VIEWS: CPT

## 2023-04-08 PROCEDURE — 71046 X-RAY EXAM CHEST 2 VIEWS: CPT | Mod: 26

## 2023-04-10 RX ORDER — FUROSEMIDE 40 MG
1 TABLET ORAL
Qty: 0 | Refills: 0 | DISCHARGE

## 2023-04-10 RX ORDER — ATORVASTATIN CALCIUM 80 MG/1
1 TABLET, FILM COATED ORAL
Qty: 0 | Refills: 0 | DISCHARGE

## 2023-04-10 RX ORDER — RAMIPRIL 5 MG
1 CAPSULE ORAL
Qty: 0 | Refills: 0 | DISCHARGE

## 2023-04-10 RX ORDER — METOPROLOL TARTRATE 50 MG
1 TABLET ORAL
Qty: 0 | Refills: 0 | DISCHARGE

## 2023-04-20 ENCOUNTER — APPOINTMENT (OUTPATIENT)
Dept: ORTHOPEDIC SURGERY | Facility: CLINIC | Age: 88
End: 2023-04-20

## 2023-09-11 NOTE — DISCHARGE NOTE NURSING/CASE MANAGEMENT/SOCIAL WORK - [id number]_GEN_ALL_CORE
No Intermediate Repair Preamble Text (Leave Blank If You Do Not Want): Local anesthesia was obtained. The tissue surrounding the operative site was undermined with blunt scissor dissection. Hemostasis was obtained using electrocoagulation. The orientation of the closure was placed within the relaxed skin tension lines. The superficial fascia and subcutaneous layers were first closed with buried vertical mattress sutures. The epidermis was then approximated. Careful attention was given to eversion and even approximation of the edges. Standing tissue cones were removed as necessary by standard technique. A pressure dressing was applied.
